# Patient Record
Sex: FEMALE | Race: WHITE | NOT HISPANIC OR LATINO | Employment: STUDENT | URBAN - METROPOLITAN AREA
[De-identification: names, ages, dates, MRNs, and addresses within clinical notes are randomized per-mention and may not be internally consistent; named-entity substitution may affect disease eponyms.]

---
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
## 2021-01-03 ENCOUNTER — HEALTH MAINTENANCE LETTER (OUTPATIENT)
Age: 21
End: 2021-01-03

## 2021-01-20 ENCOUNTER — OFFICE VISIT (OUTPATIENT)
Dept: ORTHOPEDICS | Facility: CLINIC | Age: 21
End: 2021-01-20
Payer: COMMERCIAL

## 2021-01-20 VITALS — WEIGHT: 140 LBS | BODY MASS INDEX: 24.8 KG/M2 | HEIGHT: 63 IN

## 2021-01-20 DIAGNOSIS — Z98.890 S/P SHOULDER SURGERY: Primary | ICD-10-CM

## 2021-01-20 ASSESSMENT — MIFFLIN-ST. JEOR: SCORE: 1379.17

## 2021-01-20 NOTE — LETTER
2021      RE: Leonela Morales  [address] [address]       Service Date: 2021      CHIEF COMPLAINT:  Postoperative visit, right shoulder.      DATE OF SURGERY:  2020.      HISTORY OF PRESENT ILLNESS:  Leonela Morales is a 20-year-old female who is now 4-1/2 months status post right shoulder arthroscopic posterior labral repair.  Doing quite well.  Ranks her shoulder as a 75/100.  She has no instability.  She has some minimal pain along her biceps.      PHYSICAL EXAMINATION:  Evaluation of her range of motion reveals 175 degrees of forward flexion, 60 degrees of external rotation, 95 degrees of external rotation in abduction, 20 degrees of internal rotation.  This compares to 180/60/95/40 on the contralateral left shoulder.  Her rotator cuff strength is 5/5.  She has no posterior apprehension.      IMPRESSION:  Four and half months status post right shoulder arthroscopic posterior labral repair.  Doing well.      PLAN:   1.  Leonela does need to work on her internal rotation.   2.  She should continue her strengthening.   3.  We can very gradually start to add some cheer activities.  We did discuss precautions.   4.  Follow up with me in 8 weeks for a routine recheck.         JEM ALEJANDRE MD             D: 2021   T: 2021   MT: ean      Name:     LEONELA MORALES   MRN:      [id number]        Account:      [id number]   :      2000           Service Date: 2021      Document: [id number]        Jem Alejandre MD

## 2021-01-20 NOTE — LETTER
Date:February 3, 2021      Patient was self referred, no letter generated. Do not send.        [facility] Health Information

## 2021-01-28 NOTE — PROGRESS NOTES
Service Date: 2021      CHIEF COMPLAINT:  Postoperative visit, right shoulder.      DATE OF SURGERY:  2020.      HISTORY OF PRESENT ILLNESS:  David Morales is a 20-year-old female who is now 4-1/2 months status post right shoulder arthroscopic posterior labral repair.  Doing quite well.  Ranks her shoulder as a 75/100.  She has no instability.  She has some minimal pain along her biceps.      PHYSICAL EXAMINATION:  Evaluation of her range of motion reveals 175 degrees of forward flexion, 60 degrees of external rotation, 95 degrees of external rotation in abduction, 20 degrees of internal rotation.  This compares to 180/60/95/40 on the contralateral left shoulder.  Her rotator cuff strength is 5/5.  She has no posterior apprehension.      IMPRESSION:  Four and half months status post right shoulder arthroscopic posterior labral repair.  Doing well.      PLAN:   1.  David does need to work on her internal rotation.   2.  She should continue her strengthening.   3.  We can very gradually start to add some cheer activities.  We did discuss precautions.   4.  Follow up with me in 8 weeks for a routine recheck.         JEM REYNOLDS MD             D: 2021   T: 2021   MT: ean      Name:     DAVID MORALES   MRN:      [id number]        Account:      [id number]   :      2000           Service Date: 2021      Document: [id number]

## 2021-02-02 NOTE — PROGRESS NOTES
[facility] ATHLETICS  Susu ATC follow-up note  Date of service performed: 2/1/2021    Concern/injury: R Shoulder    Assessment/plan: Athlete went home for [place] break but continued an at home rehab plan agreed upon by her and ATC. Athlete left with signs of impingement beginning on her R side but came pack with minimal to no pain. Athlete mentioned this to Dr. Alejandre and continuing to strengthen and proper scapular movement should help alleviate any pain. Athlete is compliant and energetic about rehabilitation. Continues to improve muscular endurance and strength. Athlete has slowly started a return to sport with some hand stands and light tumbling approved by Dr. Alejandre. Athlete would benefit from continued therapy to improve muscular endurance and return to sport.    [employer], ATC

## 2021-03-23 ENCOUNTER — OFFICE VISIT (OUTPATIENT)
Dept: ORTHOPEDICS | Facility: CLINIC | Age: 21
End: 2021-03-23
Payer: COMMERCIAL

## 2021-03-23 VITALS — HEIGHT: 63 IN | BODY MASS INDEX: 24.8 KG/M2 | WEIGHT: 140 LBS

## 2021-03-23 DIAGNOSIS — Z98.890 S/P SHOULDER SURGERY: Primary | ICD-10-CM

## 2021-03-23 ASSESSMENT — MIFFLIN-ST. JEOR: SCORE: 1379.17

## 2021-03-23 NOTE — LETTER
Date:April 1, 2021      Patient was self referred, no letter generated. Do not send.        [facility] Health Information

## 2021-03-23 NOTE — LETTER
3/23/2021      RE: David Morales  [address] [address] [address]       Service Date: 2021      CHIEF COMPLAINT:  Postoperative visit, right shoulder.      DATE OF SURGERY:  2020.      HISTORY OF PRESENT ILLNESS:  David Morales is a 21-year-old female who is now 6-1/2 months status post right shoulder posterior labral repair.  She is doing well.  Ranks her shoulder as an 85/100.  She has no instability.  She still has some soreness.  Overall, she is happy with her progress.      PHYSICAL EXAMINATION:  21-year-old female, alert and oriented, in no apparent distress.  Evaluation of her range of motion reveals 180 degrees of forward flexion, 60 degrees of external rotation, 95 degrees of external rotation in abduction and 35 degrees of internal rotation.  This compares to 180/60/95/40 on the contralateral left shoulder.  Rotator cuff strength is 5/5.  She has slight discomfort in apprehension.  She has slight discomfort on posterior apprehension.  This is much better than preop.  She has a negative anterior load shift and negative posterior load shift.      IMPRESSION:  6-1/2 months status post right shoulder arthroscopic posterior labral repair.  Ms. Morales is doing well.  We had a conversation about returning to athletics.  I think she can continue to advance her stunting.  I think we need to be careful in terms of fatigue factor as she really has not done much in the way of high-level athletic activity.  I think we need to be mindful of this as we start her summer training.      PLAN:   1.  Continue to work on a baseline cuff and periscapular strengthening program.   2.  Gradual advancement of her cheerleading participation.   3.  Follow up with me in September for a routine recheck.         JEM REYNOLDS MD             D: 2021   T: 2021   MT: ean      Name:     DAVID MORALES   MRN:      [id number]        Account:      [id number]   :      2000           Service  Date: 03/23/2021      Document: [id number]        Emiliano Alejandre MD

## 2021-03-28 NOTE — PROGRESS NOTES
Service Date: 2021      CHIEF COMPLAINT:  Postoperative visit, right shoulder.      DATE OF SURGERY:  2020.      HISTORY OF PRESENT ILLNESS:  David Morales is a 21-year-old female who is now 6-1/2 months status post right shoulder posterior labral repair.  She is doing well.  Ranks her shoulder as an 85/100.  She has no instability.  She still has some soreness.  Overall, she is happy with her progress.      PHYSICAL EXAMINATION:  21-year-old female, alert and oriented, in no apparent distress.  Evaluation of her range of motion reveals 180 degrees of forward flexion, 60 degrees of external rotation, 95 degrees of external rotation in abduction and 35 degrees of internal rotation.  This compares to 180/60/95/40 on the contralateral left shoulder.  Rotator cuff strength is 5/5.  She has slight discomfort in apprehension.  She has slight discomfort on posterior apprehension.  This is much better than preop.  She has a negative anterior load shift and negative posterior load shift.      IMPRESSION:  6-1/2 months status post right shoulder arthroscopic posterior labral repair.  Ms. Morales is doing well.  We had a conversation about returning to athletics.  I think she can continue to advance her stunting.  I think we need to be careful in terms of fatigue factor as she really has not done much in the way of high-level athletic activity.  I think we need to be mindful of this as we start her summer training.      PLAN:   1.  Continue to work on a baseline cuff and periscapular strengthening program.   2.  Gradual advancement of her cheerleading participation.   3.  Follow up with me in September for a routine recheck.         JME REYNOLDS MD             D: 2021   T: 2021   MT: ean      Name:     DAVID MORALES   MRN:      [id number]        Account:      [id number]   :      2000           Service Date: 2021      Document: [id number]

## 2021-05-15 ENCOUNTER — DOCUMENTATION ONLY (OUTPATIENT)
Dept: ORTHOPEDICS | Facility: CLINIC | Age: 21
End: 2021-05-15

## 2021-05-15 NOTE — PROGRESS NOTES
Completed Workability and faxed to [employer] at [phone]. Submitted original to Medical Records for scanning. Patient alerted via phone.

## 2021-05-19 DIAGNOSIS — U07.1 CLINICAL DIAGNOSIS OF COVID-19: ICD-10-CM

## 2021-05-20 ENCOUNTER — OFFICE VISIT (OUTPATIENT)
Dept: FAMILY MEDICINE | Facility: CLINIC | Age: 21
End: 2021-05-20
Payer: COMMERCIAL

## 2021-05-20 VITALS
DIASTOLIC BLOOD PRESSURE: 60 MMHG | HEIGHT: 63 IN | WEIGHT: 126.8 LBS | SYSTOLIC BLOOD PRESSURE: 114 MMHG | HEART RATE: 74 BPM | BODY MASS INDEX: 22.47 KG/M2

## 2021-05-20 DIAGNOSIS — U07.1 INFECTION DUE TO 2019 NOVEL CORONAVIRUS: Primary | ICD-10-CM

## 2021-05-20 LAB — INTERPRETATION ECG - MUSE: NORMAL

## 2021-05-20 ASSESSMENT — MIFFLIN-ST. JEOR: SCORE: 1314.29

## 2021-05-20 NOTE — PROGRESS NOTES
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 Tested positive in Oct. During illness patient experienced only minor symptoms. Denies having any symptoms of fever, night sweats, shortness of breath, wheezing, chest pain, cough, loss of taste, loss of smell, nausea, vomiting or diarrhea.Their course is resolved. Since quarantine they have tried full activity without any symptoms.    PMH, Medications and Allergies were reviewed and updated as needed.    ROS:  As noted above in HPI otherwise negative.    Patient Active Problem List   Diagnosis     Instability of shoulder joint, unspecified laterality   c    Current Outpatient Medications   Medication Sig Dispense Refill     acetaminophen (TYLENOL) 325 MG tablet Take 2 tablets (650 mg) by mouth every 4 hours as needed for mild pain (Patient not taking: Reported on 9/23/2020) 50 tablet 0     ibuprofen (ADVIL/MOTRIN) 600 MG tablet Take 1 tablet (600 mg) by mouth every 6 hours as needed for other (mild and/or inflammatory pain) (Patient not taking: Reported on 9/23/2020) 30 tablet 0     loratadine (CLARITIN) 10 MG tablet Take 10 mg by mouth daily       ondansetron (ZOFRAN) 8 MG tablet Take 1 tablet (8 mg) by mouth every 8 hours as needed for nausea (Patient not taking: Reported on 9/23/2020) 20 tablet 1     ondansetron (ZOFRAN-ODT) 4 MG ODT tab Take 1-2 tablets (4-8 mg) by mouth every 8 hours as needed for nausea (Patient not taking: Reported on 9/23/2020) 4 tablet 0     oxyCODONE (ROXICODONE) 5 MG tablet Take 1-2 tablets (5-10 mg) by mouth every 4 hours as needed for moderate to severe pain (Patient not taking: Reported on 9/23/2020) 30 tablet 0     senna-docusate (SENOKOT-S/PERICOLACE) 8.6-50 MG tablet Take 1-2 tablets by mouth 2 times daily (Patient not taking: Reported on 9/23/2020) 30  "tablet 0              OBJECTIVE:     /60   Pulse 74   Ht 1.6 m (5' 3\")   Wt 57.5 kg (126 lb 12.8 oz)   BMI 22.46 kg/m    Estimated body mass index is 22.46 kg/m  as calculated from the following:    Height as of this encounter: 1.6 m (5' 3\").    Weight as of this encounter: 57.5 kg (126 lb 12.8 oz).      GENERAL: Healthy, alert and no distress  EYES: Eyes grossly normal to inspection.  No discharge or erythema, or obvious scleral/conjunctival abnormalities.  Cardiac: Regular rate and rhythm. No murmer, rubs, or gallops.  RESP: No wheeze, rhonchi or rales. No cough, or visible cyanosis.  No visible retractions or increased work of breathing.    SKIN: Visible skin clear. No significant rash, abnormal pigmentation or lesions.  NEURO: Cranial nerves grossly intact.  Mentation and speech appropriate for age.  PSYCH: Mentation appears normal, affect normal/bright, judgement and insight intact, normal speech and appearance well-groomed.      EKG:  Sinus arrythmia and sinus stevan.  Unchanged from a previous ekg in 2020.             ASSESSMENT & PLAN:      There are no diagnoses linked to this encounter.    - Done with Isolation   - EKG done.  No change from previoius.  Will be overread by Cards.    Cleared for activity and cheer. Return sooner if develops new or worsening symptoms.    Options for treatment and/or follow-up care were reviewed with the patient and , . Patient was actively involved in the decision making process. Patient verbalized understanding and was in agreement with the plan.        Chucho Chahal MD  "

## 2021-05-20 NOTE — LETTER
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 Tested positive in Oct. During illness patient experienced only minor symptoms. Denies having any symptoms of fever, night sweats, shortness of breath, wheezing, chest pain, cough, loss of taste, loss of smell, nausea, vomiting or diarrhea.Their course is resolved. Since quarantine they have tried full activity without any symptoms.    PMH, Medications and Allergies were reviewed and updated as needed.    ROS:  As noted above in HPI otherwise negative.    Patient Active Problem List   Diagnosis     Instability of shoulder joint, unspecified laterality   c    Current Outpatient Medications   Medication Sig Dispense Refill     acetaminophen (TYLENOL) 325 MG tablet Take 2 tablets (650 mg) by mouth every 4 hours as needed for mild pain (Patient not taking: Reported on 9/23/2020) 50 tablet 0     ibuprofen (ADVIL/MOTRIN) 600 MG tablet Take 1 tablet (600 mg) by mouth every 6 hours as needed for other (mild and/or inflammatory pain) (Patient not taking: Reported on 9/23/2020) 30 tablet 0     loratadine (CLARITIN) 10 MG tablet Take 10 mg by mouth daily       ondansetron (ZOFRAN) 8 MG tablet Take 1 tablet (8 mg) by mouth every 8 hours as needed for nausea (Patient not taking: Reported on 9/23/2020) 20 tablet 1     ondansetron (ZOFRAN-ODT) 4 MG ODT tab Take 1-2 tablets (4-8 mg) by mouth every 8 hours as needed for nausea (Patient not taking: Reported on 9/23/2020) 4 tablet 0     oxyCODONE (ROXICODONE) 5 MG tablet Take 1-2 tablets (5-10 mg) by mouth every 4 hours as needed for moderate to severe pain (Patient not taking: Reported on 9/23/2020) 30 tablet 0     senna-docusate (SENOKOT-S/PERICOLACE) 8.6-50 MG tablet  "Take 1-2 tablets by mouth 2 times daily (Patient not taking: Reported on 9/23/2020) 30 tablet 0              OBJECTIVE:     /60   Pulse 74   Ht 1.6 m (5' 3\")   Wt 57.5 kg (126 lb 12.8 oz)   BMI 22.46 kg/m    Estimated body mass index is 22.46 kg/m  as calculated from the following:    Height as of this encounter: 1.6 m (5' 3\").    Weight as of this encounter: 57.5 kg (126 lb 12.8 oz).      GENERAL: Healthy, alert and no distress  EYES: Eyes grossly normal to inspection.  No discharge or erythema, or obvious scleral/conjunctival abnormalities.  Cardiac: Regular rate and rhythm. No murmer, rubs, or gallops.  RESP: No wheeze, rhonchi or rales. No cough, or visible cyanosis.  No visible retractions or increased work of breathing.    SKIN: Visible skin clear. No significant rash, abnormal pigmentation or lesions.  NEURO: Cranial nerves grossly intact.  Mentation and speech appropriate for age.  PSYCH: Mentation appears normal, affect normal/bright, judgement and insight intact, normal speech and appearance well-groomed.      EKG:  Sinus arrythmia and sinus stevan.  Unchanged from a previous ekg in 2020.             ASSESSMENT & PLAN:      There are no diagnoses linked to this encounter.    - Done with Isolation   - EKG done.  No change from previoius.  Will be overread by Cards.    Cleared for activity and cheer. Return sooner if develops new or worsening symptoms.    Options for treatment and/or follow-up care were reviewed with the patient and , . Patient was actively involved in the decision making process. Patient verbalized understanding and was in agreement with the plan.        MD Chucho Arshad MD    "

## 2021-05-26 ENCOUNTER — OFFICE VISIT (OUTPATIENT)
Dept: ORTHOPEDICS | Facility: CLINIC | Age: 21
End: 2021-05-26
Payer: COMMERCIAL

## 2021-05-26 DIAGNOSIS — Z98.890 S/P SHOULDER SURGERY: Primary | ICD-10-CM

## 2021-05-26 NOTE — LETTER
5/26/2021      RE: Leonela Mclean  [address] [facility] [address]       Service Date: 05/26/2021    CHIEF COMPLAINT:  Postoperative visit, right shoulder.    DATE OF SURGERY:  09/09/2020    HISTORY OF PRESENT ILLNESS:  Leonela Mclean is a 21-year-old female who is now 5-1/2 months status post right arthroscopic posterior labral repair.  Overall, Leonela is doing well.  She has had some increase in shoulder pain as her activity has increased.  The shoulder pain is in a different location.  Her pain is more anterior.  She points to her biceps groove.  She does not have any sensation of posterior instability.  This has resolved following her posterior labral repair.  She continues to do rehabilitation.    PHYSICAL EXAMINATION:  21-year-old female, alert, oriented, in no apparent distress.  Evaluation of her range of motion reveals symmetrical range of motion.  Rotator cuff strength is 5/5.  She has slight AC joint tenderness.  This is minimal.  She has more marked biceps groove tenderness.  She has a negative apprehension.  She has a negative posterior apprehension.  She has negative load and shift.  She has positive Chloe.  Positive uppercut test.    ASSESSMENT AND PLAN:  21-year-old female 6 months status post right posterior labral repair.  I do believe Leonela is having some biceps tendinitis.  We discussed this at length.  I explained that this is not uncommon given her activity level.  We discussed ways to manage this.  We discussed anti-inflammatory medication.  We discussed injections.  We discussed load management.  She does have some pretty significant training to do in July.    PLAN:    1.  She will continue rehabilitation program with her .  We will allow her to participate as tolerated.  2.  If her symptoms worsen, we will start her on an anti-inflammatory medication.  If we do not improve her symptoms, then we may consider an intra-articular corticosteroid injection.  3.  Follow  up with me in August for routine recheck or sooner as needed.    Emiliano Alejandre MD        D: 2021   T: 2021   MT: ean    Name:     DAVID MORALES  MRN:      [id number]        Account:      [id number]   :      2000           Service Date: 2021       Document: [id number]      Emiliano Alejandre MD

## 2021-05-26 NOTE — LETTER
Date:June 1, 2021      Patient was self referred, no letter generated. Do not send.        [facility] Health Information

## 2021-05-27 ASSESSMENT — PATIENT HEALTH QUESTIONNAIRE - PHQ9: SUM OF ALL RESPONSES TO PHQ QUESTIONS 1-9: 0

## 2021-05-27 NOTE — TELEPHONE ENCOUNTER
Question following Office Visit  When did you see your provider: Yesterday Dr. Matamoros  What is your question: Patient's mom Yamile is asking what was the reason school would ask for sickle cell screen. Relayed patient was tested yesterday and results were negative. Yamile is trying to determine what the ICD-10 code was for this test. It is not covered by her insurance and Yamile is trying to determine the cost. Please reach out to Yamile and advise.  Okay to leave a detailed message: Yes [phone]

## 2021-05-27 NOTE — TELEPHONE ENCOUNTER
Please let mother know that Dr. Matamoros is out of the clinic today and will be returning tomorrow. I will leave this for her tomorrow.

## 2021-05-27 NOTE — TELEPHONE ENCOUNTER
Called and discussed with mother.  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  She did have opportunity to waive it with sign waiver.  We discussed reasoning behind sickle cell screen.  Mom was satisfied with my answers.  She may call business office if not covered and high cost.  Mayela Matamoros MD 4/11/2019 10:09 AM

## 2021-05-27 NOTE — TELEPHONE ENCOUNTER
----- Message from Mayela Matamoros MD sent at 4/10/2019  2:48 PM CDT -----  Please call Leonela with a message of negative HIV, chlamydia test.  Sickle cell screen is negative (normal) as well.  Cholesterol levels are normal too.  Her number is typed in AVS.  She states OK to leave message. Mayela Matamoros MD 4/10/2019 2:48 PM

## 2021-05-27 NOTE — PROGRESS NOTES
[facility] Well Child Check    ASSESSMENT & PLAN  Leonela Mclean is a 19 y.o. who has normal growth and normal development.    Diagnoses and all orders for this visit:    Encounter for annual health examination  -     Meningococcal MCV4P  -     Lipid Cascade RANDOM  -     Hearing Screening  -     Vision Screening  -     PHQ9 Depression Screen  -     HIV Antigen/Antibody Screening Cascade  -     HM1(CBC and Differential)  -     Sickle Cell Screen  -     HM1 (CBC with Diff)  -     Chlamydia trachomatis & Neisseria gonorrhoeae, Amplified Detection    Other orders  -     Meningococcal B (PF)  -     Hepatitis A vaccine Ped/Adol 2 dose IM (18yr & under)      Completed sports physical required for athletics at [employer] for [facility] [employer], discussed sickle cell screening.  She has ability to waive sickle cell screening.  Discussed reasons to screen to identify people with sickle cell trait.  She chose to have sickle cell screening today    Also discussed recommendations for HIV and chlamydia screening as well.  She denies history of intercourse but agreed to screening.    Return to clinic in 1 year for a Well Child Check or sooner as needed    IMMUNIZATIONS/LABS  Immunizations were reviewed and orders were placed as appropriate.    REFERRALS  Dental:  The patient has already established care with a dentist.  Other:  No additional referrals were made at this time.    ANTICIPATORY GUIDANCE  I have reviewed age appropriate anticipatory guidance.    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  Plans on dorm living- hoping to live in [place].        No question data found.    Do you have any significant health concerns in your family history?: No  Family History   Problem Relation Age of Onset     Hyperlipidemia Mother      Cancer Paternal Grandmother      Cancer  Paternal Grandfather         Skin     Hypertension Other      Since your last visit, have there been any major changes in your family, such as a move, job change, separation, divorce, or death in the family?: No  Has a lack of transportation kept you from medical appointments?: No    Home  Who lives in your home?:  Mom,dad,brother  Social History     Social History Narrative    Lives with Mom, dad, and brother, Jacob (17)    Dad is a  and Mom is a     Mom and Dad are      Do you have any concerns about losing your housing?: No  Is your housing safe and comfortable?: Yes  Do you have any trouble with sleep?:  No    Education  What school do you child attend?:  [place]  What grade are you in?:  12th  How do you perform in school (grades, behavior, attention, homework?: Good     Eating  Do you eat regular meals including fruits and vegetables?:  yes  What are you drinking (cow's milk, water, soda, juice, sports drinks, energy drinks, etc)?: water and juice  Have you been worried that you don't have enough food?: No  Do you have concerns about your body or appearance?:  No    Activities  Do you have friends?:  yes  Do you get at least one hour of physical activity per day?:  yes  How many hours a day are you in front of a screen other than for schoolwork (computer, TV, phone)?:  1-5  What do you do for exercise?:  Gymnastic,workout  Do you have interest/participate in community activities/volunteers/school sports?:  yes    MENTAL HEALTH SCREENING  PHQ-2 Total Score: 0 (4/9/2019 11:21 AM)    PHQ-9 Total Score: 0 (4/9/2019 11:21 AM)      VISION/HEARING  Vision: aged out  Hearing:  aged out     Visual Acuity Screening    Right eye Left eye Both eyes   Without correction: 20/20 20/25 20/20   With correction:        Vision completed for sport physical screen.    TB Risk Assessment:  The patient and/or parent/guardian answer positive to:  patient and/or parent/guardian answer 'no' to all  "screening TB questions    Dyslipidemia Risk Screening  Have either of your parents or any of your grandparents had a stroke or heart attack before age 55?: No  Any parents with high cholesterol or currently taking medications to treat?: Yes: mom-, but not on medication     Dental  When was the last time you saw the dentist?: 3-6 months ago   Last fluoride varnish application was within the past 30 days. Fluoride not applied today.      Patient Active Problem List   Diagnosis     Limb Pain       Drugs  Does the patient use tobacco/alcohol/drugs?:   no    Safety  Does the patient have any safety concerns (peer or home)?:  no  Does the patient use safety belts, helmets and other safety equipment?:  yes    Sex  Have you ever had sex?:  No    MEASUREMENTS  Height:  5' 3\" (1.6 m)  Weight: 141 lb 14.4 oz (64.4 kg)  BMI: Body mass index is 25.14 kg/m .  Blood Pressure: 104/60  Blood pressure percentiles are not available for patients who are 18 years or older.    PHYSICAL EXAM  Constitutional: She appears well-developed and well-nourished. She is awake, alert, and cooperative.  HEENT: Head: Normocephalic. Atraumatic.   Right Ear: Normal, pearly tympanic membrane; external ear and canal normal.    Left Ear: Normal, pearly tympanic membrane; external ear and canal normal.    Nose: Nose normal.    Mouth/Throat: Mucous membranes are moist. Oropharynx is clear. Tonsils +1 bilaterally. Normal dentition.   Eyes: Conjunctivae and lids are normal. PERRL, EOMI.   Neck: Supple without lymphadenopathy or tenderness. No thyromegaly or nodules.  Cardiovascular: Normal rate and regular rhythm. No murmur heard. Femoral pulses 2+ bilaterally.  Pulmonary: Clear to auscultation bilaterally. Effort and breath sounds normal. There is normal air entry.   Chest: Normal chest wall. Breast development is normal. SMR 5.   Abdominal: Soft, nontender, nondistended. Bowel sounds are normal. No hepatosplenomegaly.  Musculoskeletal: Moving all " extremities with normal range of motion. Normal strength and tone. No abnormalities. No pain in the extremities.  Genitourinary: Normal external female genitalia. SMR 5.   Neurological: She is alert and oriented x3. She has normal reflexes. Normal tone and DTRs +2 bilaterally.  Psychiatric: She has a normal mood and affect. Her speech and behavior are normal.  Skin: Clear. No rashes or lesions noted.    Complete sports physical and questionnaire completed, no restrictions.

## 2021-05-27 NOTE — TELEPHONE ENCOUNTER
I let Leonela know her results we negative. She had no further questions at this time.   Vanessa Amanda,A

## 2021-05-28 ASSESSMENT — ANXIETY QUESTIONNAIRES: GAD7 TOTAL SCORE: 0

## 2021-05-28 NOTE — PROGRESS NOTES
Service Date: 05/26/2021    CHIEF COMPLAINT:  Postoperative visit, right shoulder.    DATE OF SURGERY:  09/09/2020    HISTORY OF PRESENT ILLNESS:  Leonela Mclean is a 21-year-old female who is now 5-1/2 months status post right arthroscopic posterior labral repair.  Overall, Leonela is doing well.  She has had some increase in shoulder pain as her activity has increased.  The shoulder pain is in a different location.  Her pain is more anterior.  She points to her biceps groove.  She does not have any sensation of posterior instability.  This has resolved following her posterior labral repair.  She continues to do rehabilitation.    PHYSICAL EXAMINATION:  21-year-old female, alert, oriented, in no apparent distress.  Evaluation of her range of motion reveals symmetrical range of motion.  Rotator cuff strength is 5/5.  She has slight AC joint tenderness.  This is minimal.  She has more marked biceps groove tenderness.  She has a negative apprehension.  She has a negative posterior apprehension.  She has negative load and shift.  She has positive [place].  Positive uppercut test.    ASSESSMENT AND PLAN:  21-year-old female 6 months status post right posterior labral repair.  I do believe Leonela is having some biceps tendinitis.  We discussed this at length.  I explained that this is not uncommon given her activity level.  We discussed ways to manage this.  We discussed anti-inflammatory medication.  We discussed injections.  We discussed load management.  She does have some pretty significant training to do in July.    PLAN:    1.  She will continue rehabilitation program with her .  We will allow her to participate as tolerated.  2.  If her symptoms worsen, we will start her on an anti-inflammatory medication.  If we do not improve her symptoms, then we may consider an intra-articular corticosteroid injection.  3.  Follow up with me in August for routine recheck or sooner as needed.    Emiliano Alejandre,  MD        D: 2021   T: 2021   MT: ean    Name:     DAVID MORALES  MRN:      [id number]        Account:      [id number]   :      2000           Service Date: 2021       Document: [id number]

## 2021-06-02 VITALS — WEIGHT: 141.9 LBS | HEIGHT: 63 IN | BODY MASS INDEX: 25.14 KG/M2

## 2021-06-04 VITALS
HEIGHT: 63 IN | SYSTOLIC BLOOD PRESSURE: 100 MMHG | BODY MASS INDEX: 25.52 KG/M2 | DIASTOLIC BLOOD PRESSURE: 70 MMHG | WEIGHT: 144 LBS | HEART RATE: 60 BPM

## 2021-06-17 NOTE — PATIENT INSTRUCTIONS - HE
"Patient Instructions by Mayela Matamoros MD at 4/9/2019 11:00 AM     Author: Mayela Matamoros MD Service: -- Author Type: Physician    Filed: 4/9/2019 12:04 PM Encounter Date: 4/9/2019 Status: Addendum    : Mayela Matamoros MD (Physician)    Related Notes: Original Note by Mayela Matamoros MD (Physician) filed at 4/9/2019 12:04 PM       I will put in orders for future immunizations that can be completed at any time at a shot only appointment  1) Hepatitis A  2) Meningococcal vaccine (2 of 2 series) and 3) Meningococcal B vaccine- it is a vaccine that protects against a specific strain of bacteria that the previous Meningococcal vaccine does not cover.  Recommended for patients entering college, but not typically required.  I do recommend it.    We will obtain cholesterol, hemoglobin and sickle cell trait screening today    Sharon cell is : [phone]  I will call with results (or my staff will) and also send a letter home.    \"As part of annual well teen checks, the [place] Department of Health recommends that females age 16 and older be screened yearly for chlamydia, whether or not they have told their provider they have a history of sexual activity.  Chlamydia is a sexually transmitted infection that does not always have symptoms.  Untreated chlamydia infections can lead to infertility, chronic pelvic pain, or ectopic pregnancy.  Your child may have had chlamydia screening as part of their well teen check today.  However, in accordance with state law (statute 144.431-347), your teen's results of chlamydia testing are confidential to your teen and medical provider.  This statue guarantees confidentiality for teens and their health care providers when discussing sexually transmitted infections.  If your child was tested, I will notify your child of the result directly.\"        Patient Education             [facility] Patient Handout   18 to 21 Year Visits     Your Daily " Faxed again to [phone]   Life    Visit the dentist at least twice a year.    Protect your hearing at work, home, and concerts.    Eat a variety of healthy foods.    Eat breakfast every morning.    Drink plenty of water.    Make sure to get enough calcium.    Have 3 or more servings of low-fat (1%) or fat-free milk and other low-fat dairy products each day.    Aim for 1 hour of vigorous physical activity.    Be proud of yourself when you do something well.  Healthy Behavior Choices    Support friends who choose not to use drugs, alcohol, tobacco, steroids, or diet pills.    If you use drugs or alcohol, you can talk to us about it. We can help you with quitting or cutting down on your use.    Make healthy decisions about your sexual behavior.    If you are sexually active, always practice safe sex. Always use a condom to prevent STIs.    All sexual activity should be something you want. No one should ever force or try to convince you.    Find safe activities at school and in the community. Violence and Injuries    Do not drink and drive or ride in a vehicle with someone who has been using drugs or alcohol.    If you feel unsafe driving or riding with someone, call someone you trust to drive you.    Always wear a seat belt in the car.    Know the rules for safe driving.    Never allow physical harm of yourself or others at home or school.    Always deal with conflict using nonviolence.    Remember that healthy dating relationships are built on respect and that saying no is OK.    Fighting and carrying weapons can be dangerous.  Your Feelings    Figure out healthy ways to deal with stress.    Try your best to solve problems and make decisions on your own.    Most people have daily ups and downs. But if you are feeling sad, depressed, nervous, irritable, hopeless, or angry, talk with me or another health professional.    We understand sexuality is an important part of your development. If you have any questions or concerns, we are here for you.  School and Friends    Take responsibility for being organized enough to succeed in work or school.    Find new activities you enjoy.    Consider volunteering and helping others in the community on an issue that interests or concerns you.    Form healthy friendships and find fun, safe things to do with friends.    As you get older, making and keeping friends is important. You may find that you drift away from some of your old friends--thats normal.    Evaluate your friendships and keep those that are healthy.    It is still important to stay connected with your family.

## 2021-06-19 NOTE — LETTER
Letter by Mayela Matamoros MD at      Author: Mayela Matamoros MD Service: -- Author Type: --    Filed:  Encounter Date: 4/9/2019 Status: (Other)         April 9, 2019     Patient: Leonela Mclean   YOB: 2000   Date of Visit: 4/9/2019       To Whom it May Concern:    Leonela Mclean was seen in my clinic on 4/9/2019.    If you have any questions or concerns, please don't hesitate to call.    Sincerely,         Electronically signed by Mayela Matamoros MD

## 2021-06-19 NOTE — LETTER
Letter by Mayela Matamoros MD at      Author: Mayela Matamoros MD Service: -- Author Type: --    Filed:  Encounter Date: 4/10/2019 Status: (Other)       Parent/guardian of Leonela Mclean  [address]  [place] [facility] [address]             April 10, 2019         Dear Leonela Mclean,    Below are the results from Leonela's recent visit:    Resulted Orders   Lipid Cascade RANDOM   Result Value Ref Range    Cholesterol 187 <=199 mg/dL    Triglycerides 62 <=149 mg/dL    HDL Cholesterol 71 >=50 mg/dL    LDL Calculated 104 <=129 mg/dL    Patient Fasting > 8hrs? Yes    HIV Antigen/Antibody Screening Cascade   Result Value Ref Range    HIV Antigen / Antibody Negative Negative    Narrative    Method is Abbott HIV Ag/Ab for the detection of HIV p24 antigen, HIV-1 antibodies and HIV-2 antibodies.   Sickle Cell Screen   Result Value Ref Range    Sickle Cell Screen Negative Negative   HM1 (CBC with Diff)   Result Value Ref Range    WBC 5.2 4.0 - 11.0 thou/uL    RBC 4.91 3.80 - 5.40 mill/uL    Hemoglobin 14.4 12.0 - 16.0 g/dL    Hematocrit 42.5 35.0 - 47.0 %    MCV 87 80 - 100 fL    MCH 29.3 27.0 - 34.0 pg    MCHC 33.9 32.0 - 36.0 g/dL    RDW 12.0 11.0 - 14.5 %    Platelets 262 140 - 440 thou/uL    MPV 7.2 7.0 - 10.0 fL    Neutrophils % 62 50 - 70 %    Lymphocytes % 30 20 - 40 %    Monocytes % 6 2 - 10 %    Eosinophils % 2 0 - 6 %    Basophils % 0 0 - 2 %    Neutrophils Absolute 3.2 2.0 - 7.7 thou/uL    Lymphocytes Absolute 1.6 0.8 - 4.4 thou/uL    Monocytes Absolute 0.3 0.0 - 0.9 thou/uL    Eosinophils Absolute 0.1 0.0 - 0.4 thou/uL    Basophils Absolute 0.0 0.0 - 0.2 thou/uL   Chlamydia trachomatis & Neisseria gonorrhoeae, Amplified Detection   Result Value Ref Range    Chlamydia trachomatis, Amplified Detection Negative Negative    Neisseria gonorrhoeae, Amplified Detection Negative Negative           Please call with questions or contact us using [employer].    Sincerely,        Electronically signed by Mayela  Blessing Matamoros MD

## 2021-06-29 NOTE — PROGRESS NOTES
"Progress Notes by Franny Porter MD at 8/3/2020 10:40 AM     Author: Franny Porter MD Service: -- Author Type: Physician    Filed: 8/3/2020 12:35 PM Encounter Date: 8/3/2020 Status: Signed    : Franny Porter MD (Physician)       FEMALE PREVENTATIVE EXAM    Assessment and Plan:     Leonela was seen today for annual exam.    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  She had cholesterol levels, hemoglobin, HIV and GC testing done last year 4/2019.  She has never been sexually active and defers additional testing today.  She will be due for a Pap at the age of 22.  I did offer her a pelvic exam given her concern for not being able to insert a tampon but she suspects it is likely due to \"tensing up \"and she is going to continue topractice with this further.  She declined  exam today.             Encounter for counseling regarding contraception  Reviewed range of contraceptive options, from OCPs, the patch, Depo-provera, to hormonal vs nonhormonal IUD and nexplanon, as well as various options for natural family planning or surgical. Reviewed typical effectiveness of each as well as side effect profiles of these options, including effects on spotting, nausea/ headaches, and emotional lability as well as risk for DVT/PE. Pt elects for most likely going with Depo-Provera. She will contact me if she confirms she wants to start this. Good candidate for this, with no h/o migraine w/ aura, liver or clotting or kidney issues. Nonsmoker.       Next follow up:  Return in about 1 year (around 8/3/2021) for Annual physical.    Immunization Review  Adult Imm Review: No immunizations due today    I discussed the following with the patient:   Adult Healthy Living: Importance of regular exercise  Healthy nutrition  Weight loss referral options  Getting adequate sleep  Stress " management  Use of seat belts  Distracted driving  Helmets  Sporting equipment safety  Firearm safety  STI prevention  Contraception options  Supplement use  Herbal medications/alternative medical therapies    I have had an Advance Directives discussion with the patient.    Subjective:   Chief Complaint: Leonela Mclean is an 20 y.o. female here for a preventative health visit.     HPI:    Chief Complaint   Patient presents with   ? Annual Exam       She has a left labral shoulder tear- elective surgery in April was canceled.  She is a cheerleader at [employer]; now putting off the surgery until Feb 2021.  Reports some instability and gives out sometimes. Working with her ; doing PT as well.    She will be a sophomore in college- studying kinesiology. Thinking sports med field for a career. Did well in school. 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 Exercises daily at the gym daily, and cheer team. No smoking or drug use.     Wondering about birth control options.  She is never been sexually active yet but would like to have something available if or when she decides to be.    She reports she struggles to place a tampon so for this reason is not interested in the IUD or vaginal insert  Thinking about Depo injection.   No fam hx of clotting disorder or liver issues.  No personal hx of migraines.     Social History     Social History Narrative    Lives with Mom, dad, and brother, Jacob     Dad is a  and Mom is a     Mom and Dad are         She will be a sophomore in college- studying kinesiology. Thinking sports med field for a career. 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 No smoking or drug use. Denies alcohol concerns.       Healthy Habits  Are you taking a daily aspirin? No  Do you typically exercising at least 40 min, 3-4 times per week?  Yes  Do you usually eat at least  "4 servings of fruit and vegetables a day, include whole grains and fiber and avoid regularly eating high fat foods? Yes  Have you had an eye exam in the past two years? Yes  Do you see a dentist twice per year? Yes  Do you have any concerns regarding sleep? No    Safety Screen  If you own firearms, are they secured in a locked gun cabinet or with trigger locks? NO  Do you feel you are safe where you are living?: Yes (8/3/2020 10:41 AM)  Do you feel you are safe in your relationship(s)?: Yes (8/3/2020 10:41 AM)      Review of Systems:  Please see above.  The rest of the review of systems are negative for all systems.       Cancer Screening     Patient has no health maintenance due at this time          Patient Care Team:  Mayela Matamoros MD as PCP - General (Pediatrics)  Mayela Matamoros MD as Assigned PCP        History     Reviewed By Date/Time Sections Reviewed    Franny Porter MD 8/3/2020 12:35 PM Social Documentation    Franny Porter MD 8/3/2020 12:25 PM Medical, Surgical, Tobacco, Alcohol, Drug Use, Sexual Activity, Family    HesumitchChelsi CMA 8/3/2020 10:42 AM Tobacco            Objective:   Vital Signs:   Visit Vitals  /70 (Patient Site: Left Arm, Patient Position: Sitting, Cuff Size: Adult Regular)   Pulse 60   Ht 5' 2.5\" (1.588 m)   Wt 144 lb (65.3 kg)   LMP 07/27/2020 (Approximate)   Breastfeeding No   BMI 25.92 kg/m           PHYSICAL EXAM  Constitutional: Patient is oriented to person, place, and time. Patient appears well-developed and well-nourished. No distress.   Head: Normocephalic and atraumatic.   Ears: External ear and TMs normal bilaterally.  Nose: Nose normal.   Mouth/Throat: Oropharynx is clear and moist. No oropharyngeal exudate.   Eyes: Conjunctivae and EOM are normal. Pupils are equal, round, and reactive to light. No discharge. No scleral icterus.   Neck: Neck supple. No JVD present. No tracheal deviation present. No thyromegaly " present.  Breasts: not indicated based on USPSTF recommendations for asymptomatic women  Cardiovascular: Normal rate, regular rhythm, normal heart sounds and intact distal pulses. No murmur heard.   Pulmonary/Chest: Effort normal and breath sounds normal. Patient has no wheezes, no rales, exhibits no tenderness.   Abdominal: Soft. Bowel sounds are normal. No masses. There is no tenderness.   Lymphadenopathy:  Patient has no cervical adenopathy.   Neurological: Patient is alert and oriented to person, place, and time. Patient has normal reflexes. No cranial nerve deficit. Coordination normal.   Skin: Skin is warm and dry. No rash noted. No pallor.   Pelvic: pt declined exam  Psychiatric: Patient has good eye contact without any psychomotor retardation or stereotypic behaviors.  normal mood and affect. Judgment and thought content normal.   Speech is regular rate and rhythm.          Medication List          Accurate as of August 3, 2020 12:35 PM. If you have any questions, ask your nurse or doctor.            CONTINUE taking these medications    loratadine 10 mg tablet  Also known as:  CLARITIN  INSTRUCTIONS:  Take 10 mg by mouth daily.               Additional Screenings Completed Today:   PHQ9 score PHQ-9 Total Score: 0 (8/3/2020 11:00 AM)    Little interest or pleasure in doing things: Not at all  Feeling down, depressed, or hopeless: Not at all  Trouble falling or staying asleep, or sleeping too much: Not at all  Feeling tired or having little energy: Not at all  Poor appetite or overeating: Not at all  Feeling bad about yourself - or that you are a failure or have let yourself or your family down: Not at all  Trouble concentrating on things, such as reading the newspaper or watching television: Not at all  Moving or speaking so slowly that other people could have noticed. Or the opposite - being so fidgety or restless that you have been moving around a lot more than usual: Not at all  Thoughts that you would be  better off dead, or of hurting yourself in some way: Not at all  PHQ-9 Total Score: 0    GAD7 score TOM-7 Total: 0 (8/3/2020 11:00 AM)    Feeling nervous, anxious or on edge: 0 (8/3/2020 11:00 AM)  Not being able to stop or control worry: 0 (8/3/2020 11:00 AM)  Worrying too much about different things: 0 (8/3/2020 11:00 AM)  Trouble relaxin (8/3/2020 11:00 AM)  Being so restless that is is hard to sit still: 0 (8/3/2020 11:00 AM)  Becoming easily annnoyed or irritable: 0 (8/3/2020 11:00 AM)  Feeling afraid as if something awful might happen: 0 (8/3/2020 11:00 AM)  TOM-7 Total: 0 (8/3/2020 11:00 AM)

## 2021-07-19 DIAGNOSIS — G89.29 CHRONIC SHOULDER PAIN, UNSPECIFIED LATERALITY: Primary | ICD-10-CM

## 2021-07-19 DIAGNOSIS — M25.519 CHRONIC SHOULDER PAIN, UNSPECIFIED LATERALITY: Primary | ICD-10-CM

## 2021-07-22 ENCOUNTER — OFFICE VISIT (OUTPATIENT)
Dept: FAMILY MEDICINE | Facility: CLINIC | Age: 21
End: 2021-07-22
Payer: COMMERCIAL

## 2021-07-22 VITALS
DIASTOLIC BLOOD PRESSURE: 62 MMHG | BODY MASS INDEX: 22.11 KG/M2 | HEART RATE: 55 BPM | HEIGHT: 63 IN | SYSTOLIC BLOOD PRESSURE: 114 MMHG | WEIGHT: 124.8 LBS

## 2021-07-22 DIAGNOSIS — R53.83 FATIGUE, UNSPECIFIED TYPE: Primary | ICD-10-CM

## 2021-07-22 ASSESSMENT — ENCOUNTER SYMPTOMS
PSYCHIATRIC NEGATIVE: 1
CARDIOVASCULAR NEGATIVE: 1
RESPIRATORY NEGATIVE: 1
GASTROINTESTINAL NEGATIVE: 1
NEUROLOGICAL NEGATIVE: 1

## 2021-07-22 ASSESSMENT — MIFFLIN-ST. JEOR: SCORE: 1305.22

## 2021-07-22 NOTE — PROGRESS NOTES
HPI  In for fatigue and 2 months without having her period.  She knows it is likely because she started running a lot in training for a half marathon.  It was a lot more running than what she usually does.  She didn't change her dietary intake and noticed that she was getting more tired, losing some weight and not having her period.  She hadn't had this happen before.  Low TRIAD risk on intake exam.  No stress injuries.     Review of Systems   Constitutional: Positive for malaise/fatigue.   HENT: Negative.    Respiratory: Negative.    Cardiovascular: Negative.    Gastrointestinal: Negative.    Genitourinary: Negative.    Neurological: Negative.    Psychiatric/Behavioral: Negative.          Physical Exam  Constitutional:       Appearance: Normal appearance.   HENT:      Head: Normocephalic and atraumatic.   Cardiovascular:      Rate and Rhythm: Normal rate.      Pulses: Normal pulses.      Heart sounds: Normal heart sounds.   Pulmonary:      Effort: Pulmonary effort is normal. No respiratory distress.      Breath sounds: Normal breath sounds. No wheezing.   Abdominal:      General: Abdomen is flat. There is no distension.      Palpations: There is no mass.      Tenderness: There is no abdominal tenderness.   Neurological:      Mental Status: She is alert and oriented to person, place, and time.   Psychiatric:         Mood and Affect: Mood normal.         Behavior: Behavior normal.         Thought Content: Thought content normal.           Fatigue  -this is all likely explained by calorie deficit with increased running activity and not increased intake.  Discussed this at length.  She feels she can increase dietary intake and has already decreased running.   Offered nutrition ref if she desires.  If not feeling better or having return of her period we will start a full medical work up.  ATC to check on pregnancy possibility as well.

## 2021-07-22 NOTE — LETTER
7/22/2021      RE: Leonela Mclean  [address] [facility] [address]       HPI  In for fatigue and 2 months without having her period.  She knows it is likely because she started running a lot in training for a half marathon.  It was a lot more running than what she usually does.  She didn't change her dietary intake and noticed that she was getting more tired, losing some weight and not having her period.  She hadn't had this happen before.  Low TRIAD risk on intake exam.  No stress injuries.     Review of Systems   Constitutional: Positive for malaise/fatigue.   HENT: Negative.    Respiratory: Negative.    Cardiovascular: Negative.    Gastrointestinal: Negative.    Genitourinary: Negative.    Neurological: Negative.    Psychiatric/Behavioral: Negative.          Physical Exam  Constitutional:       Appearance: Normal appearance.   HENT:      Head: Normocephalic and atraumatic.   Cardiovascular:      Rate and Rhythm: Normal rate.      Pulses: Normal pulses.      Heart sounds: Normal heart sounds.   Pulmonary:      Effort: Pulmonary effort is normal. No respiratory distress.      Breath sounds: Normal breath sounds. No wheezing.   Abdominal:      General: Abdomen is flat. There is no distension.      Palpations: There is no mass.      Tenderness: There is no abdominal tenderness.   Neurological:      Mental Status: She is alert and oriented to person, place, and time.   Psychiatric:         Mood and Affect: Mood normal.         Behavior: Behavior normal.         Thought Content: Thought content normal.       Fatigue  -this is all likely explained by calorie deficit with increased running activity and not increased intake.  Discussed this at length.  She feels she can increase dietary intake and has already decreased running.   Offered nutrition ref if she desires.  If not feeling better or having return of her period we will start a full medical work up.  ATC to check on pregnancy possibility as  demetri.      Chucho Chahal MD

## 2021-07-31 DIAGNOSIS — M25.519 CHRONIC SHOULDER PAIN, UNSPECIFIED LATERALITY: ICD-10-CM

## 2021-07-31 DIAGNOSIS — G89.29 CHRONIC SHOULDER PAIN, UNSPECIFIED LATERALITY: ICD-10-CM

## 2021-08-05 ENCOUNTER — OFFICE VISIT (OUTPATIENT)
Dept: FAMILY MEDICINE | Facility: CLINIC | Age: 21
End: 2021-08-05
Payer: COMMERCIAL

## 2021-08-05 VITALS
HEART RATE: 60 BPM | BODY MASS INDEX: 19.94 KG/M2 | WEIGHT: 116.8 LBS | SYSTOLIC BLOOD PRESSURE: 112 MMHG | DIASTOLIC BLOOD PRESSURE: 62 MMHG | HEIGHT: 64 IN

## 2021-08-05 DIAGNOSIS — N91.1 SECONDARY AMENORRHEA: ICD-10-CM

## 2021-08-05 DIAGNOSIS — N94.2 VAGINISMUS: ICD-10-CM

## 2021-08-05 DIAGNOSIS — R53.83 FATIGUE, UNSPECIFIED TYPE: ICD-10-CM

## 2021-08-05 DIAGNOSIS — Z00.00 ROUTINE GENERAL MEDICAL EXAMINATION AT A HEALTH CARE FACILITY: Primary | ICD-10-CM

## 2021-08-05 LAB
CHOLEST SERPL-MCNC: 194 MG/DL
ERYTHROCYTE [DISTWIDTH] IN BLOOD BY AUTOMATED COUNT: 13.8 % (ref 10–15)
FASTING STATUS PATIENT QL REPORTED: NORMAL
FASTING STATUS PATIENT QL REPORTED: NORMAL
FERRITIN SERPL-MCNC: 110 NG/ML (ref 10–130)
GLUCOSE BLD-MCNC: 73 MG/DL (ref 70–125)
HCT VFR BLD AUTO: 41.7 % (ref 35–47)
HDLC SERPL-MCNC: 76 MG/DL
HGB BLD-MCNC: 13.9 G/DL (ref 11.7–15.7)
LDLC SERPL CALC-MCNC: 103 MG/DL
MCH RBC QN AUTO: 31.4 PG (ref 26.5–33)
MCHC RBC AUTO-ENTMCNC: 33.3 G/DL (ref 31.5–36.5)
MCV RBC AUTO: 94 FL (ref 78–100)
PLATELET # BLD AUTO: 214 10E3/UL (ref 150–450)
RBC # BLD AUTO: 4.42 10E6/UL (ref 3.8–5.2)
TRIGL SERPL-MCNC: 74 MG/DL
TSH SERPL DL<=0.005 MIU/L-ACNC: 1.14 UIU/ML (ref 0.3–5)
WBC # BLD AUTO: 3.8 10E3/UL (ref 4–11)

## 2021-08-05 PROCEDURE — 82306 VITAMIN D 25 HYDROXY: CPT | Performed by: FAMILY MEDICINE

## 2021-08-05 PROCEDURE — 99395 PREV VISIT EST AGE 18-39: CPT | Performed by: FAMILY MEDICINE

## 2021-08-05 PROCEDURE — 82947 ASSAY GLUCOSE BLOOD QUANT: CPT | Performed by: FAMILY MEDICINE

## 2021-08-05 PROCEDURE — 85027 COMPLETE CBC AUTOMATED: CPT | Performed by: FAMILY MEDICINE

## 2021-08-05 PROCEDURE — 99213 OFFICE O/P EST LOW 20 MIN: CPT | Mod: 25 | Performed by: FAMILY MEDICINE

## 2021-08-05 PROCEDURE — 96127 BRIEF EMOTIONAL/BEHAV ASSMT: CPT | Performed by: FAMILY MEDICINE

## 2021-08-05 PROCEDURE — 84443 ASSAY THYROID STIM HORMONE: CPT | Performed by: FAMILY MEDICINE

## 2021-08-05 PROCEDURE — 36415 COLL VENOUS BLD VENIPUNCTURE: CPT | Performed by: FAMILY MEDICINE

## 2021-08-05 PROCEDURE — 82728 ASSAY OF FERRITIN: CPT | Performed by: FAMILY MEDICINE

## 2021-08-05 PROCEDURE — 80061 LIPID PANEL: CPT | Performed by: FAMILY MEDICINE

## 2021-08-05 ASSESSMENT — MIFFLIN-ST. JEOR: SCORE: 1276.86

## 2021-08-05 NOTE — PROGRESS NOTES
SUBJECTIVE:   CC: Leonela Mclean is an 21 year old woman who presents for preventive health visit.     Patient has been advised of split billing requirements and indicates understanding: Yes  Healthy Habits:     Getting at least 3 servings of Calcium per day:  Yes    Bi-annual eye exam:  Yes    Dental care twice a year:  Yes    Sleep apnea or symptoms of sleep apnea:  None    Diet:  Other    Frequency of exercise:  6-7 days/week    Duration of exercise:  Greater than 60 minutes    Taking medications regularly:  Yes    Medication side effects:  None    PHQ-2 Total Score: 0    Additional concerns today:  No  Abnormal Bleeding Problem      She had COVID 19 illness for 10 days in November 2020. She did go through cardiac clearance protocols per her [employer] sports team.    Right shoulder arthroscopy Sept 2020 and recovered from this. She is on diclofenac during her [employer] practices (twice a day). This med was started just a few weeks ago.    No menstural cycle since beginning of May. Normally she is very regular. Was training for a half marathon (which she completed 2 weeks ago now). t was a lot more running than what she usually does.  She didn't change her dietary intake and noticed that she was getting more tired, losing some weight and not having her period.  She hadn't had this happen before.  No stress injuries.       Trying to increase her caloric intake; avoiding more aggressive workouts. Not running as much in the past 2 weeks now in effort to return her menstrual cycle.     She is down about 10lbs in the past 2 months even due to her marathon training.    Highest weight was 145lbs as freshman but last year mostly at 130lbs. Currently at about 116lbs.    She is taking Vitamin D supplement and loves her veggies/spinach!      Wt Readings from Last 3 Encounters:   08/05/21 53 kg (116 lb 12.8 oz)   07/22/21 56.6 kg (124 lb 12.8 oz)   05/20/21 57.5 kg (126 lb 12.8 oz)             Today's PHQ-2 Score:   PHQ-2  "( 1999 Pfizer) 8/5/2021   Q1: Little interest or pleasure in doing things 0   Q2: Feeling down, depressed or hopeless 0   PHQ-2 Score 0   Q1: Little interest or pleasure in doing things Not at all   Q2: Feeling down, depressed or hopeless Not at all   PHQ-2 Score 0       Abuse: Current or Past (Physical, Sexual or Emotional) - No  Do you feel safe in your environment? Yes        Social History     Tobacco Use     Smoking status: Never Smoker     Smokeless tobacco: Never Used   Substance Use Topics     Alcohol use: Not on file         Alcohol Use 8/5/2021   Prescreen: >3 drinks/day or >7 drinks/week? Not Applicable       Reviewed orders with patient.  Reviewed health maintenance and updated orders accordingly - Yes    History of abnormal Pap smear: NO - under age 21, PAP not appropriate for age     Reviewed and updated as needed this visit by clinical staff  Tobacco  Allergies  Meds  Problems  Med Hx  Surg Hx  Fam Hx          Reviewed and updated as needed this visit by Provider  Tobacco  Allergies  Meds  Problems  Med Hx  Surg Hx  Fam Hx             Review of Systems   Genitourinary: Positive for menstrual problem.        OBJECTIVE:   /62 (BP Location: Left arm, Patient Position: Sitting, Cuff Size: Adult Regular)   Pulse 60   Ht 1.613 m (5' 3.5\")   Wt 53 kg (116 lb 12.8 oz)   LMP 05/05/2021 (Approximate)   BMI 20.37 kg/m    Physical Exam  Constitutional: Patient is oriented to person, place, and time. Patient appears well-developed and well-nourished. No distress.   Head: Normocephalic and atraumatic.   Ears: External ear and TMs normal bilaterally.  Nose: Nose normal.   Mouth/Throat: Oropharynx is clear and moist. No oropharyngeal exudate.   Eyes: Conjunctivae and EOM are normal. Pupils are equal, round, and reactive to light. No discharge. No scleral icterus.   Neck: Neck supple. No JVD present. No tracheal deviation present. No thyromegaly present.  Breasts: not indicated based on USPSTF " recommendations for asymptomatic women  Cardiovascular: Normal rate, regular rhythm, normal heart sounds and intact distal pulses. No murmur heard.   Pulmonary/Chest: Effort normal and breath sounds normal. Patient has no wheezes, no rales, exhibits no tenderness.   Abdominal: Soft. Bowel sounds are normal. No masses. There is no tenderness.   Lymphadenopathy:  Patient has no cervical adenopathy.   Neurological: Patient is alert and oriented to person, place, and time. Patient has normal reflexes. No cranial nerve deficit. Coordination normal.   Skin: Skin is warm and dry. No rash noted. No pallor.   Pelvic: pt declined exam  Psychiatric: Patient has good eye contact without any psychomotor retardation or stereotypic behaviors.  normal mood and affect. Judgment and thought content normal.   Speech is regular rate and rhythm.       Diagnostic Test Results:  Labs reviewed in Epic    ASSESSMENT/PLAN:   Leonela was seen today for physical and abnormal bleeding problem.    Diagnoses and all orders for this visit:    Routine general medical examination at a health care facility  -     Lipid Profile  -     Glucose    Fatigue, unspecified type  We will check a few labs today to evaluate the fatigue further though for now can hold off on the full metabolic work-up for the secondary amenorrhea (see next).  She has never been sexually active, defers UPT/chlamydia testing today.  -     TSH with free T4 reflex  -     CBC with platelets  -     Ferritin  -     Vitamin D Deficiency    Secondary amenorrhea  Agree with her recent visit to Dr. Chahal in that her calorie deficit and excessive exercise while training for her half marathon has likely contributed to her 2 months of amenorrhea (with 10-15lb weight loss).  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  I have encouraged her to work with my fitness pal to track calories a few  "days per week to get a sense for if she is meeting these goals.  We can certainly help place referral to the nutritionist if she desires this down the road but she feels this information today was helpful as a guideline.  We also reviewed calorically dense healthy nutritious foods such as avocado, coconut milk, avocado oil/oil olive oil for healthy fats as well as increasing protein.     --> Advised to follow up after 3 months of adequate caloric intake if still not having menstrual cycles with return of her normal body weight. Pt agreeable with plan.        Vaginismus  She continues to struggle with tampon insertion but hasn't attempted much lately. Declines exam today. Suspect vaginismus.  Offered pelvic floor therapy if desires down the road. Never sexually active yet.         Patient has been advised of split billing requirements and indicates understanding: Yes           COUNSELING:  Reviewed preventive health counseling, as reflected in patient instructions    Estimated body mass index is 20.37 kg/m  as calculated from the following:    Height as of this encounter: 1.613 m (5' 3.5\").    Weight as of this encounter: 53 kg (116 lb 12.8 oz).    Weight management plan noted, stable and monitoring    She reports that she has never smoked. She has never used smokeless tobacco.      Counseling Resources:  ATP IV Guidelines  Pooled Cohorts Equation Calculator  Breast Cancer Risk Calculator  BRCA-Related Cancer Risk Assessment: FHS-7 Tool  FRAX Risk Assessment  ICSI Preventive Guidelines  Dietary Guidelines for Americans, 2010  USDA's MyPlate  ASA Prophylaxis  Lung CA Screening    Franny Porter MD  [facility]"

## 2021-08-05 NOTE — PATIENT INSTRUCTIONS
Your BMR is 1300 to JUST REST all day. Likely needing closer to the 2500 calories daily to sustain your workout intensity.  [employer] Pal to track calories.

## 2021-08-06 LAB — DEPRECATED CALCIDIOL+CALCIFEROL SERPL-MC: 76 UG/L (ref 30–80)

## 2021-08-24 ENCOUNTER — DOCUMENTATION ONLY (OUTPATIENT)
Dept: FAMILY MEDICINE | Facility: CLINIC | Age: 21
End: 2021-08-24

## 2021-08-24 NOTE — PROGRESS NOTES
Date: 8/19/2021    Today, Leonela comes into the clinic requesting IASTM on her R shoulder    Subjective: Tamera says her shoulder has been stiff and sore. IASTM has helped in the past so she requests that today    Objective: Stiffness and tightness over the anterior superior portion of the arm and shoulder    Action: IASTM o anterior superior portion of the arm and shoulder    Plan: Leonela will RTC as needed for shoulder pain

## 2021-09-28 ENCOUNTER — OFFICE VISIT (OUTPATIENT)
Dept: ORTHOPEDICS | Facility: CLINIC | Age: 21
End: 2021-09-28
Payer: COMMERCIAL

## 2021-09-28 VITALS — WEIGHT: 116 LBS | HEIGHT: 64 IN | BODY MASS INDEX: 19.81 KG/M2

## 2021-09-28 DIAGNOSIS — Z98.890 S/P SHOULDER SURGERY: Primary | ICD-10-CM

## 2021-09-28 PROCEDURE — 99213 OFFICE O/P EST LOW 20 MIN: CPT | Performed by: ORTHOPAEDIC SURGERY

## 2021-09-28 ASSESSMENT — MIFFLIN-ST. JEOR: SCORE: 1273.23

## 2021-09-28 NOTE — LETTER
9/28/2021         RE: Leonela Mclean  [address] [facility] [id number]        Dear Colleague,    Thank you for referring your patient, Leonela Mclean, to the [facility] [place]. Please see a copy of my visit note below.    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  She is not had any instability.  She is complaining of anterior shoulder pain.  She does have some occasional catching in her shoulder.  Her previous posterior instability has improved.    Physical Examination:  21-year-old female alert oriented no apparent distress.  She has full and symmetrical range of motion of her shoulders.  Her rotator cuff strength is 5/5 and symmetrical.    Evaluation of the right shoulder reveals slight pain in the bicipital groove.  Nontender laterally.  She has no apprehension.  No posterior apprehension.  She has no posterior load-and-shift.  She has a 2+ anterior load-and-shift.  She has a positive [place]'s.  She has a markedly positive crank test.    Impression:  1 year status post right shoulder posterior labral repair for posterior instability.  I do believe Leonela's posterior instability has improved.  I am a bit more concerned about her superior labrum and biceps tendon at this point.  We discussed rehabilitation.  We discussed a short course of anti-inflammatories to try to quiet her biceps tendon down.  We also discussed an intra-articular injection if needed.    Plan:  1. Leonela will continue to work with her  focusing on cuff and periscapular strengthening  2. I did provide her with a prescription for Voltaren.  Precautions were given.  3. If her symptoms make it hard for her to compete in National competition we would consider an  intra-articular injection later this winter.      Again, thank you for allowing me to participate in the care of your patient.        Sincerely,        Emiliano Alejandre MD

## 2021-10-02 NOTE — PROGRESS NOTES
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  She is not had any instability.  She is complaining of anterior shoulder pain.  She does have some occasional catching in her shoulder.  Her previous posterior instability has improved.    Physical Examination:  21-year-old female alert oriented no apparent distress.  She has full and symmetrical range of motion of her shoulders.  Her rotator cuff strength is 5/5 and symmetrical.    Evaluation of the right shoulder reveals slight pain in the bicipital groove.  Nontender laterally.  She has no apprehension.  No posterior apprehension.  She has no posterior load-and-shift.  She has a 2+ anterior load-and-shift.  She has a positive [place]'s.  She has a markedly positive crank test.    Impression:  1 year status post right shoulder posterior labral repair for posterior instability.  I do believe Leonela's posterior instability has improved.  I am a bit more concerned about her superior labrum and biceps tendon at this point.  We discussed rehabilitation.  We discussed a short course of anti-inflammatories to try to quiet her biceps tendon down.  We also discussed an intra-articular injection if needed.    Plan:  1. Leonela will continue to work with her  focusing on cuff and periscapular strengthening  2. I did provide her with a prescription for Voltaren.  Precautions were given.  3. If her symptoms make it hard for her to compete in National competition we would consider an intra-articular injection later this winter.

## 2021-10-10 ENCOUNTER — HEALTH MAINTENANCE LETTER (OUTPATIENT)
Age: 21
End: 2021-10-10

## 2021-10-13 ENCOUNTER — OFFICE VISIT (OUTPATIENT)
Dept: ORTHOPEDICS | Facility: CLINIC | Age: 21
End: 2021-10-13
Payer: COMMERCIAL

## 2021-10-13 VITALS
HEIGHT: 64 IN | WEIGHT: 109 LBS | HEART RATE: 38 BPM | BODY MASS INDEX: 18.61 KG/M2 | DIASTOLIC BLOOD PRESSURE: 58 MMHG | SYSTOLIC BLOOD PRESSURE: 96 MMHG

## 2021-10-13 DIAGNOSIS — R00.1 BRADYCARDIA: ICD-10-CM

## 2021-10-13 DIAGNOSIS — N91.1 AMENORRHEA, SECONDARY: Primary | ICD-10-CM

## 2021-10-13 DIAGNOSIS — R53.83 OTHER FATIGUE: ICD-10-CM

## 2021-10-13 DIAGNOSIS — R63.4 LOSS OF WEIGHT: ICD-10-CM

## 2021-10-13 ASSESSMENT — ANXIETY QUESTIONNAIRES
1. FEELING NERVOUS, ANXIOUS, OR ON EDGE: SEVERAL DAYS
6. BECOMING EASILY ANNOYED OR IRRITABLE: NOT AT ALL
2. NOT BEING ABLE TO STOP OR CONTROL WORRYING: NOT AT ALL
7. FEELING AFRAID AS IF SOMETHING AWFUL MIGHT HAPPEN: NOT AT ALL
5. BEING SO RESTLESS THAT IT IS HARD TO SIT STILL: NOT AT ALL
3. WORRYING TOO MUCH ABOUT DIFFERENT THINGS: NOT AT ALL
GAD7 TOTAL SCORE: 1

## 2021-10-13 ASSESSMENT — MIFFLIN-ST. JEOR: SCORE: 1241.48

## 2021-10-13 ASSESSMENT — PATIENT HEALTH QUESTIONNAIRE - PHQ9
5. POOR APPETITE OR OVEREATING: NOT AT ALL
SUM OF ALL RESPONSES TO PHQ QUESTIONS 1-9: 2

## 2021-10-13 NOTE — LETTER
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 She started training for a 1/2 marathon last summer and noticed weight loss that she attributed to not fueling as well as she should have. However, she stopped running and has continued to have weight loss and now notes more fatigue. Her max weight was 152 in 2019 and now she is down to 109 lbs. She says she has low energy and finds it hard to do usual things. She does not like that she has lost muscle mass. She does not think she has any issues with body image or an eating disorder, but notes that her parents have wondered about orthorexia. She thinks she doesn't eat enough carbs, but tries to get a good variety of other things in her diet. Doesn't eat dairy as it makes her feel bad and does not like to eat much red meat, but will eat white meat and tuna. She tries to eat nuts everyday and will eat a lot of fruits and veggies. She doesn't think she is trying to restrict calories. She has not met with a nutritionist.    Her last period was in May. She has not had any recent bony stress injuries, though did have shin splints in high school.   She denies visual changes, fever, jitteriness, bowel or bladder changes (except some occ constipation). She has felt dizzy at cheer practice.       Eating disorder SYMPTOMS:   Restricting:  yes  Binging: no  Purging:  no  Diet Pills: no  Laxatives: no  Diuretics:no  Syrup of Ipecac:no     Eating Behaviors:  Counting calories:  no  Counting grams of fat:  no  Primarily ingest fat free foods: no  Variety in diet: yes  Preference to eat alone: no  Practice vegetarianism: no     Exercise:  Days per week: daily  Exercise to  "affect shape or weight: unclear  Type of exercise and duration:  cheer, currently not running     Eating Patterns:  BREAKFAST: 4 egg whites with veggies  MORNING SNACK:  smoothie  LUNCH:  deli turkey, no bread, carrots, rice cake with PB, banana  AFTERNOON SNACK:  Rice Thins  DINNER:  veggies/meat  EVENING SNACK:    FLUIDS: water 3-4 L per day  AVOIDS:  carbs, red meat, dairy (doesn't sit well)     Symptoms related to eating:   nausea:  none  abdominal pain:  none  gas/bloating:  none  diarrhea:  none  vomiting:  none     Weight History:  Current weight: 109 (down from 124 on 7/24/21 and 116 on 8/5/21)  Current height:  5'3.5\"  Highest weight:   10/2019 152  Lowest weight:  109      Does not weigh herself, no goal weight, though thinks she should be closer to 120-130 lbs.    Menstrual History:  Date of last menstrual period: May 2021  Menses regular monthly:  no  On hormone medication/BCP:  no  Menarche at age: 15    Bone Health History:  Previous stress fracture: no but does have h/o \"shin splints\"  Previous acute fracture: no  Prior DEXA: no      SOCIAL HISTORY:     Denies EtOH, Tobacco, Drugs                 PHQ-9:   PHQ-9 (Pfizer) 8/3/2020 10/13/2021   1.  Little interest or pleasure in doing things 0 0   2.  Feeling down, depressed, or hopeless 0 0   3.  Trouble falling or staying asleep, or sleeping too much 0 0   4.  Feeling tired or having little energy 0 1   5.  Poor appetite or overeating 0 0   6.  Feeling bad about yourself 0 0   7.  Trouble concentrating 0 0   8.  Moving slowly or restless 0 1   9.  Suicidal or self-harm thoughts 0 0   PHQ-9 Total Score 0 2       TOM-7:      TOM-7   Pf[employer], 2002; Used with Permission) 8/3/2020 10/13/2021   1. Feeling nervous, anxious, or on edge 0 1   2. Not being able to stop or control worrying 0 0   3. Worrying too much about different things 0 0   4. Trouble relaxing 0 0   5. Being so restless that it is hard to sit still 0 0   6. Becoming easily annoyed or " "irritable 0 0   7. Feeling afraid, as if something awful might happen 0 0   TOM-7 Total Score 0 1               Medications:     Current Outpatient Medications   Medication Sig     diclofenac (VOLTAREN) 50 MG EC tablet Take 1 tablet (50 mg) by mouth 2 times daily as needed for moderate pain     diclofenac (VOLTAREN) 50 MG EC tablet TAKE 1 TABLET (50 MG) BY MOUTH 2 TIMES DAILY AS NEEDED FOR MODERATE PAIN     loratadine (CLARITIN) 10 MG tablet Take 10 mg by mouth daily     No current facility-administered medications for this visit.             Allergies:    No Known Allergies         Review of Systems:   A comprehensive 10 point review of systems (constitutional, ENT, cardiac, peripheral vascular, respiratory, GI, , Musculoskeletal, skin, Neurological) was performed and found to be negative except as described in this note.           Physical Exam:   COMPLETE EXAMINATION:   VITAL SIGNS: BP 96/58   Pulse (!) 38   Ht 1.613 m (5' 3.5\")   Wt 49.4 kg (109 lb)   BMI 19.01 kg/m    GEN: Alert, well-nourished, and in no distress.   HEENT:   Head: Normocephalic and atraumatic.   Eyes: PERRLA, EOMI  NEURO: Alert and oriented to person, place, and time. Gait normal. SKIN: No rash, warmth or erythema  PSYCH: Mood, memory, affect and judgment normal.     Neglected to perform more thorough exam today - will do so at our next follow up        Assessment:     Weight loss/fatigue/probable eating disorder - R/O metabolic abnormality  Secondary amenorrhea/Hypothalamic amenorrhea  Bradycardia        Plan:     1. Discussed diagnosis, concept of female athlete triad/RED-s and treatment strategy. She has had a concerning amount of weight loss and is significantly under fueling. Therefore, I recommended complete restriction from participation with [employer]ias until she is demonstrating consistent weight gain and adhering to treatment plan.  2. Screening labs, ECG, DEXA  3. Nutrition consult ASAP.  4. Referral to [facility]ya ASAP.  5. " Discussed importance of meal regularity, adequacy, variety, eating socially and spontaneously. Introduced ideas of intuitive eating, body trust and that nourishing mind and body is essential for overall health as well as sports performance. Ensure 3 meals and 2 snacks per day and add in additional smoothie or nutrient dense snack each day.  6. Obtain old growth charts to establish goal weight range (minimum target weight likely closer to 130 or 140+).  7. Follow up in 1-2 weeks.    Debora Shaffer M.D.    SHI Morton was present for the entire visit.

## 2021-10-13 NOTE — PROGRESS NOTES
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 She started training for a 1/2 marathon last summer and noticed weight loss that she attributed to not fueling as well as she should have. However, she stopped running and has continued to have weight loss and now notes more fatigue. Her max weight was 152 in 2019 and now she is down to 109 lbs. She says she has low energy and finds it hard to do usual things. She does not like that she has lost muscle mass. She does not think she has any issues with body image or an eating disorder, but notes that her parents have wondered about orthorexia. She thinks she doesn't eat enough carbs, but tries to get a good variety of other things in her diet. Doesn't eat dairy as it makes her feel bad and does not like to eat much red meat, but will eat white meat and tuna. She tries to eat nuts everyday and will eat a lot of fruits and veggies. She doesn't think she is trying to restrict calories. She has not met with a nutritionist.    Her last period was in May. She has not had any recent bony stress injuries, though did have shin splints in high school.   She denies visual changes, fever, jitteriness, bowel or bladder changes (except some occ constipation). She has felt dizzy at cheer practice.       Eating disorder SYMPTOMS:   Restricting:  yes  Binging: no  Purging:  no  Diet Pills: no  Laxatives: no  Diuretics:no  Syrup of Ipecac:no     Eating Behaviors:  Counting calories:  no  Counting grams of fat:  no  Primarily ingest fat free foods: no  Variety in diet: yes  Preference to eat alone: no  Practice vegetarianism: no     Exercise:  Days per week: daily  Exercise to affect shape or weight: unclear  Type of exercise and duration:  cheer, currently not  "running     Eating Patterns:  BREAKFAST: 4 egg whites with veggies  MORNING SNACK:  smoothie  LUNCH:  deli turkey, no bread, carrots, rice cake with PB, banana  AFTERNOON SNACK:  Rice Thins  DINNER:  veggies/meat  EVENING SNACK:    FLUIDS: water 3-4 L per day  AVOIDS:  carbs, red meat, dairy (doesn't sit well)     Symptoms related to eating:   nausea:  none  abdominal pain:  none  gas/bloating:  none  diarrhea:  none  vomiting:  none     Weight History:  Current weight: 109 (down from 124 on 7/24/21 and 116 on 8/5/21)  Current height:  5'3.5\"  Highest weight:   10/2019 152  Lowest weight:  109      Does not weigh herself, no goal weight, though thinks she should be closer to 120-130 lbs.    Menstrual History:  Date of last menstrual period: May 2021  Menses regular monthly:  no  On hormone medication/BCP:  no  Menarche at age: 15    Bone Health History:  Previous stress fracture: no but does have h/o \"shin splints\"  Previous acute fracture: no  Prior DEXA: no      SOCIAL HISTORY:     Denies EtOH, Tobacco, Drugs                 PHQ-9:   PHQ-9 ([employer]) 8/3/2020 10/13/2021   1.  Little interest or pleasure in doing things 0 0   2.  Feeling down, depressed, or hopeless 0 0   3.  Trouble falling or staying asleep, or sleeping too much 0 0   4.  Feeling tired or having little energy 0 1   5.  Poor appetite or overeating 0 0   6.  Feeling bad about yourself 0 0   7.  Trouble concentrating 0 0   8.  Moving slowly or restless 0 1   9.  Suicidal or self-harm thoughts 0 0   PHQ-9 Total Score 0 2       TOM-7:      TOM-7   [employer], 2002; Used with Permission) 8/3/2020 10/13/2021   1. Feeling nervous, anxious, or on edge 0 1   2. Not being able to stop or control worrying 0 0   3. Worrying too much about different things 0 0   4. Trouble relaxing 0 0   5. Being so restless that it is hard to sit still 0 0   6. Becoming easily annoyed or irritable 0 0   7. Feeling afraid, as if something awful might happen 0 0   TOM-7 Total " "Score 0 1               Medications:     Current Outpatient Medications   Medication Sig     diclofenac (VOLTAREN) 50 MG EC tablet Take 1 tablet (50 mg) by mouth 2 times daily as needed for moderate pain     diclofenac (VOLTAREN) 50 MG EC tablet TAKE 1 TABLET (50 MG) BY MOUTH 2 TIMES DAILY AS NEEDED FOR MODERATE PAIN     loratadine (CLARITIN) 10 MG tablet Take 10 mg by mouth daily     No current facility-administered medications for this visit.             Allergies:    No Known Allergies         Review of Systems:   A comprehensive 10 point review of systems (constitutional, ENT, cardiac, peripheral vascular, respiratory, GI, , Musculoskeletal, skin, Neurological) was performed and found to be negative except as described in this note.           Physical Exam:   COMPLETE EXAMINATION:   VITAL SIGNS: BP 96/58   Pulse (!) 38   Ht 1.613 m (5' 3.5\")   Wt 49.4 kg (109 lb)   BMI 19.01 kg/m    GEN: Alert, appears thin, and in no distress.   HEENT:   Head: Normocephalic and atraumatic.   Eyes: PERRLA, EOMI  NEURO: Alert and oriented to person, place, and time. Gait normal.   SKIN: No rash, warmth or erythema  PSYCH: Mood, memory, affect and judgment normal.     Neglected to perform more thorough exam today - will do so at our next follow up        Assessment:     Weight loss/fatigue/probable eating disorder - R/O metabolic abnormality  Secondary amenorrhea/Hypothalamic amenorrhea  Bradycardia        Plan:     1. Discussed diagnosis, concept of female athlete triad/RED-s and treatment strategy. She has had a concerning amount of weight loss and is significantly under fueling. Therefore, I recommended complete restriction from participation with [employer]ias until she is demonstrating consistent weight gain and adhering to treatment plan.  2. Screening labs, ECG, DEXA  3. Nutrition consult ASAP.  4. Referral to Anya ASAP.  5. Discussed importance of meal regularity, adequacy, variety, eating socially and " spontaneously. Introduced ideas of intuitive eating, body trust and that nourishing mind and body is essential for overall health as well as sports performance. Ensure 3 meals and 2 snacks per day and add in additional smoothie or nutrient dense snack each day.  6. Obtain old growth charts to establish goal weight range (minimum target weight likely closer to 130 or 140+).  7. Follow up in 1-2 weeks.    Debora Shaffer M.D.    SHI Morton was present for the entire visit.

## 2021-10-14 ASSESSMENT — ANXIETY QUESTIONNAIRES: GAD7 TOTAL SCORE: 1

## 2021-10-15 ENCOUNTER — ANCILLARY PROCEDURE (OUTPATIENT)
Dept: BONE DENSITY | Facility: CLINIC | Age: 21
End: 2021-10-15
Attending: PEDIATRICS
Payer: COMMERCIAL

## 2021-10-15 ENCOUNTER — OFFICE VISIT (OUTPATIENT)
Dept: ORTHOPEDICS | Facility: CLINIC | Age: 21
End: 2021-10-15
Payer: COMMERCIAL

## 2021-10-15 ENCOUNTER — LAB (OUTPATIENT)
Dept: LAB | Facility: CLINIC | Age: 21
End: 2021-10-15
Payer: COMMERCIAL

## 2021-10-15 VITALS — WEIGHT: 109 LBS | HEIGHT: 64 IN | BODY MASS INDEX: 18.61 KG/M2

## 2021-10-15 DIAGNOSIS — R00.1 BRADYCARDIA, SEVERE SINUS: ICD-10-CM

## 2021-10-15 DIAGNOSIS — N91.1 AMENORRHEA, SECONDARY: ICD-10-CM

## 2021-10-15 DIAGNOSIS — F50.9 EATING DISORDER, UNSPECIFIED TYPE: Primary | ICD-10-CM

## 2021-10-15 DIAGNOSIS — R63.4 LOSS OF WEIGHT: ICD-10-CM

## 2021-10-15 LAB
ALBUMIN SERPL-MCNC: 4.2 G/DL (ref 3.4–5)
ALP SERPL-CCNC: 41 U/L (ref 40–150)
ALT SERPL W P-5'-P-CCNC: 70 U/L (ref 0–50)
ANION GAP SERPL CALCULATED.3IONS-SCNC: 3 MMOL/L (ref 3–14)
AST SERPL W P-5'-P-CCNC: 31 U/L (ref 0–45)
BASOPHILS # BLD AUTO: 0 10E3/UL (ref 0–0.2)
BASOPHILS NFR BLD AUTO: 1 %
BILIRUB SERPL-MCNC: 0.6 MG/DL (ref 0.2–1.3)
BUN SERPL-MCNC: 17 MG/DL (ref 7–30)
CALCIUM SERPL-MCNC: 9.1 MG/DL (ref 8.5–10.1)
CHLORIDE BLD-SCNC: 105 MMOL/L (ref 94–109)
CO2 SERPL-SCNC: 30 MMOL/L (ref 20–32)
CREAT SERPL-MCNC: 1.02 MG/DL (ref 0.52–1.04)
DEPRECATED CALCIDIOL+CALCIFEROL SERPL-MC: 89 UG/L (ref 20–75)
EOSINOPHIL # BLD AUTO: 0 10E3/UL (ref 0–0.7)
EOSINOPHIL NFR BLD AUTO: 1 %
ERYTHROCYTE [DISTWIDTH] IN BLOOD BY AUTOMATED COUNT: 13.2 % (ref 10–15)
ESTRADIOL SERPL-MCNC: 19 PG/ML
FERRITIN SERPL-MCNC: 210 NG/ML (ref 12–150)
GFR SERPL CREATININE-BSD FRML MDRD: 79 ML/MIN/1.73M2
GLUCOSE BLD-MCNC: 85 MG/DL (ref 70–99)
HCT VFR BLD AUTO: 39 % (ref 35–47)
HGB BLD-MCNC: 13.1 G/DL (ref 11.7–15.7)
IMM GRANULOCYTES # BLD: 0 10E3/UL
IMM GRANULOCYTES NFR BLD: 0 %
LH SERPL-ACNC: <0.2 IU/L
LYMPHOCYTES # BLD AUTO: 1.5 10E3/UL (ref 0.8–5.3)
LYMPHOCYTES NFR BLD AUTO: 46 %
MCH RBC QN AUTO: 32.8 PG (ref 26.5–33)
MCHC RBC AUTO-ENTMCNC: 33.6 G/DL (ref 31.5–36.5)
MCV RBC AUTO: 98 FL (ref 78–100)
MONOCYTES # BLD AUTO: 0.2 10E3/UL (ref 0–1.3)
MONOCYTES NFR BLD AUTO: 8 %
NEUTROPHILS # BLD AUTO: 1.4 10E3/UL (ref 1.6–8.3)
NEUTROPHILS NFR BLD AUTO: 44 %
NRBC # BLD AUTO: 0 10E3/UL
NRBC BLD AUTO-RTO: 0 /100
PLATELET # BLD AUTO: 201 10E3/UL (ref 150–450)
POTASSIUM BLD-SCNC: 4.1 MMOL/L (ref 3.4–5.3)
PROLACTIN SERPL-MCNC: 7 UG/L (ref 3–27)
PROT SERPL-MCNC: 7.1 G/DL (ref 6.8–8.8)
RBC # BLD AUTO: 4 10E6/UL (ref 3.8–5.2)
SODIUM SERPL-SCNC: 138 MMOL/L (ref 133–144)
T3FREE SERPL-MCNC: 1.1 PG/ML (ref 2.3–4.2)
TSH SERPL DL<=0.005 MIU/L-ACNC: 2 MU/L (ref 0.4–4)
WBC # BLD AUTO: 3.1 10E3/UL (ref 4–11)

## 2021-10-15 PROCEDURE — 77080 DXA BONE DENSITY AXIAL: CPT

## 2021-10-15 PROCEDURE — 36415 COLL VENOUS BLD VENIPUNCTURE: CPT | Performed by: PATHOLOGY

## 2021-10-15 PROCEDURE — 83002 ASSAY OF GONADOTROPIN (LH): CPT | Performed by: PEDIATRICS

## 2021-10-15 PROCEDURE — 80050 GENERAL HEALTH PANEL: CPT | Performed by: PATHOLOGY

## 2021-10-15 PROCEDURE — 84146 ASSAY OF PROLACTIN: CPT | Performed by: PEDIATRICS

## 2021-10-15 PROCEDURE — 82670 ASSAY OF TOTAL ESTRADIOL: CPT | Performed by: PEDIATRICS

## 2021-10-15 PROCEDURE — 82306 VITAMIN D 25 HYDROXY: CPT | Performed by: PEDIATRICS

## 2021-10-15 PROCEDURE — 84481 FREE ASSAY (FT-3): CPT | Performed by: PEDIATRICS

## 2021-10-15 PROCEDURE — 82728 ASSAY OF FERRITIN: CPT | Performed by: PATHOLOGY

## 2021-10-15 ASSESSMENT — MIFFLIN-ST. JEOR: SCORE: 1241.48

## 2021-10-15 NOTE — LETTER
"   10/15/2021      RE: Leonela Mclean  [address] 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 She asks if she could still participate in team lift. She still says that she does not think her weight loss was intentional or that she is restricting certain foods. 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 No other new concerns.          Medications:     Current Outpatient Medications   Medication Sig     diclofenac (VOLTAREN) 50 MG EC tablet Take 1 tablet (50 mg) by mouth 2 times daily as needed for moderate pain     diclofenac (VOLTAREN) 50 MG EC tablet TAKE 1 TABLET (50 MG) BY MOUTH 2 TIMES DAILY AS NEEDED FOR MODERATE PAIN     loratadine (CLARITIN) 10 MG tablet Take 10 mg by mouth daily (Patient not taking: Reported on 10/15/2021)     No current facility-administered medications for this visit.             Allergies:    No Known Allergies         Review of Systems:   A comprehensive 10 point review of systems (constitutional, ENT, cardiac, peripheral vascular, respiratory, GI, , Musculoskeletal, skin, Neurological) was performed and found to be negative except as described in this note.           Physical Exam:   COMPLETE EXAMINATION:   VITAL SIGNS: Ht 1.613 m (5' 3.5\")   Wt 49.4 kg (109 lb)   BMI 19.01 kg/m   P38  GEN: Alert, thin, tearful at times  HEENT:   Head: Normocephalic and atraumatic.   Eyes: PERRLA, EOMI  Nose: no congestion or discharge  Ears: TM's normal, external canals normal  Mouth/Throat: MMM, No erythema or exudate.   Neck: supple, no lymphadenopathy  CV: S1S2 normal, marked bradycardia, no murmur  CHEST: CTA, Easy effort, No rales or wheezes  ABD: Soft. " "Nontender/nondistended, no HSM/mass, no rebound/guarding.  NEURO: Alert and oriented to person, place, and time. Gait normal. Coordination normal.  Normal reflexes. No cranial nerve deficit.   SKIN: No rash, warmth or erythema, +orange discoloration c/w beta carotenemia, lanugo  PSYCH: Mood, memory, affect and judgment normal.   MSK: minimal muscle mass and subcutaneous tissue    Prior weight at 15.5 years = 136 lbs  Weight on 3/23/21 = 140    Wt Readings from Last 3 Encounters:   10/15/21 49.4 kg (109 lb)   10/13/21 49.4 kg (109 lb)   09/28/21 52.6 kg (116 lb)     Ht Readings from Last 2 Encounters:   10/15/21 1.613 m (5' 3.5\")   10/13/21 1.613 m (5' 3.5\")     Facility age limit for growth percentiles is 20 years.         Imaging:     DEXA:  Impression  The most negative and valid Z-score of 0.2 at the level of the L1 - L2 lumbar spine is WITHIN normal range according to WHO criteria for premenopausal females and men age less than 50.     Results   Lumbar spine (L1-L2)  Z-score 0.2 , BMD 1.127 g/cm2.      Left Femur neck  Z-score 0.6 , BMD 1.099 g/cm2.  Left Total femur  Z-score 1.1 , BMD 1.102 g/cm2.     Right Femur neck  Z-score 0.3, BMD  1.045 g/cm2.  Right Total femur  Z-score 0.8 , BMD  1.076 g/cm2.     .  Interval change  No prior study available for comparisonRef 3     Fracture risk   Fracture risk calculation is not indicated. Ref.4   Low bone density is not the only risk factor for fracture; consider factors such as patient's age, fall risk, injury risk, previous osteoporotic fracture, family history of osteoporosis, etc.       Repeat  Recommend repeat DXA i age 65, or sooner, as clinically indicated.     Clinical correlation recommended      Technical quality  Satisfactory.   Abnormal vertebrae may be excluded from analysis if there is more than a 1.0 T-score difference between the vertebrae in question and adjacent vertebrae. Note the wide range in BMD values and T-scores within the lumbar spine. In the " circumstances, the lumbar spine is represented by L1-L2        Principal result :  Faviola Madera MD, CCD  Division of Endocrinology and Diabetes    Department of Medicine           Labs:     Component      Latest Ref Rng & Units 10/15/2021   WBC      4.0 - 11.0 10e3/uL 3.1 (L)   RBC Count      3.80 - 5.20 10e6/uL 4.00   Hemoglobin      11.7 - 15.7 g/dL 13.1   Hematocrit      35.0 - 47.0 % 39.0   MCV      78 - 100 fL 98   MCH      26.5 - 33.0 pg 32.8   MCHC      31.5 - 36.5 g/dL 33.6   RDW      10.0 - 15.0 % 13.2   Platelet Count      150 - 450 10e3/uL 201   % Neutrophils      % 44   % Lymphocytes      % 46   % Monocytes      % 8   % Eosinophils      % 1   % Basophils      % 1   % Immature Granulocytes      % 0   NRBCs per 100 WBC      <1 /100 0   Absolute Neutrophils      1.6 - 8.3 10e3/uL 1.4 (L)   Absolute Lymphocytes      0.8 - 5.3 10e3/uL 1.5   Absolute Monocytes      0.0 - 1.3 10e3/uL 0.2   Absolute Eosinophils      0.0 - 0.7 10e3/uL 0.0   Absolute Basophils      0.0 - 0.2 10e3/uL 0.0   Absolute Immature Granulocytes      <=0.0 10e3/uL 0.0   Absolute NRBCs      10e3/uL 0.0   Sodium      133 - 144 mmol/L 138   Potassium      3.4 - 5.3 mmol/L 4.1   Chloride      94 - 109 mmol/L 105   Carbon Dioxide      20 - 32 mmol/L 30   Anion Gap      3 - 14 mmol/L 3   Urea Nitrogen      7 - 30 mg/dL 17   Creatinine      0.52 - 1.04 mg/dL 1.02   Calcium      8.5 - 10.1 mg/dL 9.1   Glucose      70 - 99 mg/dL 85   Alkaline Phosphatase      40 - 150 U/L 41   AST      0 - 45 U/L 31   ALT      0 - 50 U/L 70 (H)   Protein Total      6.8 - 8.8 g/dL 7.1   Albumin      3.4 - 5.0 g/dL 4.2   Bilirubin Total      0.2 - 1.3 mg/dL 0.6   GFR Estimate      >60 mL/min/1.73m2 79   TSH      0.40 - 4.00 mU/L 2.00   Free T3      2.3 - 4.2 pg/mL 1.1 (L)   Ferritin      12 - 150 ng/mL 210 (H)   Estradiol      pg/mL 19   Lutropin      IU/L <0.2   Vitamin D Deficiency screening      20 - 75 ug/L 89 (H)   Prolactin      3 - 27 ug/L 7      ECG:  Marked sinus bradycardia HR 32, rightward axis, T wave amplitude decreased compared to previous         Assessment:     Eating Disorder with marked recent weight loss (31 lb since March) and the following associated conditions:  -Secondary amenorrhea  -Fatigue  -Marked bradycardia  -Neutropenia  -Low T3  -Elevated ALT  -Low LH        Plan:     1. We again discussed the diagnosis and treatment strategy, importance of weight restoration, adequate energy intake, and risks of malnutrition.  2. Anya intake ASAP. She may also wish to consult with Cathie Sonal. This will likely take several months to establish care there. In the meantime, we will continue to closely follow her. Recommended consultation with a nutritionist ASAP  3. Ensure 3 meals per day with all 5 food groups, 2 snacks per day and add additional calories with nutrient dense food, including carbs, fats, protein.  4. We will develop a formal treatment plan with the following goals: weight restoration (goal weight range 130-140+), normalization of vitals and labs, resume menses, attend medical appointments. Once we are seeing consistent improvement, we can start adding back in activity as long as energy intake meets demands (this will likely take several weeks to months).   5. At this time, she is NOT cleared to actively participate in any [employer] team practice, performance, exercise or lift.   6. Follow up in 2 weeks, sooner prn. (She may have her parents join us for the next appointment).    Debora Shaffer M.D.    ATC was not present for the entire visit.        Debora Shaffer MD

## 2021-10-16 NOTE — PROGRESS NOTES
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 She asks if she could still participate in team lift. She still says that she does not think her weight loss was intentional or that she is restricting certain foods. 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 No other new concerns.          Medications:     Current Outpatient Medications   Medication Sig     diclofenac (VOLTAREN) 50 MG EC tablet Take 1 tablet (50 mg) by mouth 2 times daily as needed for moderate pain     diclofenac (VOLTAREN) 50 MG EC tablet TAKE 1 TABLET (50 MG) BY MOUTH 2 TIMES DAILY AS NEEDED FOR MODERATE PAIN     loratadine (CLARITIN) 10 MG tablet Take 10 mg by mouth daily (Patient not taking: Reported on 10/15/2021)     No current facility-administered medications for this visit.             Allergies:    No Known Allergies         Review of Systems:   A comprehensive 10 point review of systems (constitutional, ENT, cardiac, peripheral vascular, respiratory, GI, , Musculoskeletal, skin, Neurological) was performed and found to be negative except as described in this note.           Physical Exam:   COMPLETE EXAMINATION:   VITAL SIGNS: Ht 1.613 m (5' 3.5\")   Wt 49.4 kg (109 lb)   BMI 19.01 kg/m   P38  GEN: Alert, thin, tearful at times  HEENT:   Head: Normocephalic and atraumatic.   Eyes: PERRLA, EOMI  Nose: no congestion or discharge  Ears: TM's normal, external canals normal  Mouth/Throat: MMM, No erythema or exudate.   Neck: supple, no lymphadenopathy  CV: S1S2 normal, marked bradycardia, no murmur  CHEST: CTA, Easy effort, No rales or wheezes  ABD: Soft. Nontender/nondistended, no HSM/mass, no rebound/guarding.  NEURO: Alert and oriented to person, " "place, and time. Gait normal. Coordination normal.  Normal reflexes. No cranial nerve deficit.   SKIN: No rash, warmth or erythema, +orange discoloration c/w beta carotenemia, lanugo  PSYCH: Mood, memory, affect and judgment normal.   MSK: minimal muscle mass and subcutaneous tissue    Prior weight at 15.5 years = 136 lbs  Weight on 3/23/21 = 140    Wt Readings from Last 3 Encounters:   10/15/21 49.4 kg (109 lb)   10/13/21 49.4 kg (109 lb)   09/28/21 52.6 kg (116 lb)     Ht Readings from Last 2 Encounters:   10/15/21 1.613 m (5' 3.5\")   10/13/21 1.613 m (5' 3.5\")     Facility age limit for growth percentiles is 20 years.         Imaging:     DEXA:  Impression  The most negative and valid Z-score of 0.2 at the level of the L1 - L2 lumbar spine is WITHIN normal range according to WHO criteria for premenopausal females and men age less than 50.     Results   Lumbar spine (L1-L2)  Z-score 0.2 , BMD 1.127 g/cm2.      Left Femur neck  Z-score 0.6 , BMD 1.099 g/cm2.  Left Total femur  Z-score 1.1 , BMD 1.102 g/cm2.     Right Femur neck  Z-score 0.3, BMD  1.045 g/cm2.  Right Total femur  Z-score 0.8 , BMD  1.076 g/cm2.     .  Interval change  No prior study available for comparisonRef 3     Fracture risk   Fracture risk calculation is not indicated. Ref.4   Low bone density is not the only risk factor for fracture; consider factors such as patient's age, fall risk, injury risk, previous osteoporotic fracture, family history of osteoporosis, etc.       Repeat  Recommend repeat DXA i age 65, or sooner, as clinically indicated.     Clinical correlation recommended      Technical quality  Satisfactory.   Abnormal vertebrae may be excluded from analysis if there is more than a 1.0 T-score difference between the vertebrae in question and adjacent vertebrae. Note the wide range in BMD values and T-scores within the lumbar spine. In the circumstances, the lumbar spine is represented by L1-L2        Principal result " :  Faviola Madera MD, [facility]  Division of Endocrinology and Diabetes    Department of Medicine           Labs:     Component      Latest Ref Rng & Units 10/15/2021   WBC      4.0 - 11.0 10e3/uL 3.1 (L)   RBC Count      3.80 - 5.20 10e6/uL 4.00   Hemoglobin      11.7 - 15.7 g/dL 13.1   Hematocrit      35.0 - 47.0 % 39.0   MCV      78 - 100 fL 98   MCH      26.5 - 33.0 pg 32.8   MCHC      31.5 - 36.5 g/dL 33.6   RDW      10.0 - 15.0 % 13.2   Platelet Count      150 - 450 10e3/uL 201   % Neutrophils      % 44   % Lymphocytes      % 46   % Monocytes      % 8   % Eosinophils      % 1   % Basophils      % 1   % Immature Granulocytes      % 0   NRBCs per 100 WBC      <1 /100 0   Absolute Neutrophils      1.6 - 8.3 10e3/uL 1.4 (L)   Absolute Lymphocytes      0.8 - 5.3 10e3/uL 1.5   Absolute Monocytes      0.0 - 1.3 10e3/uL 0.2   Absolute Eosinophils      0.0 - 0.7 10e3/uL 0.0   Absolute Basophils      0.0 - 0.2 10e3/uL 0.0   Absolute Immature Granulocytes      <=0.0 10e3/uL 0.0   Absolute NRBCs      10e3/uL 0.0   Sodium      133 - 144 mmol/L 138   Potassium      3.4 - 5.3 mmol/L 4.1   Chloride      94 - 109 mmol/L 105   Carbon Dioxide      20 - 32 mmol/L 30   Anion Gap      3 - 14 mmol/L 3   Urea Nitrogen      7 - 30 mg/dL 17   Creatinine      0.52 - 1.04 mg/dL 1.02   Calcium      8.5 - 10.1 mg/dL 9.1   Glucose      70 - 99 mg/dL 85   Alkaline Phosphatase      40 - 150 U/L 41   AST      0 - 45 U/L 31   ALT      0 - 50 U/L 70 (H)   Protein Total      6.8 - 8.8 g/dL 7.1   Albumin      3.4 - 5.0 g/dL 4.2   Bilirubin Total      0.2 - 1.3 mg/dL 0.6   GFR Estimate      >60 mL/min/1.73m2 79   TSH      0.40 - 4.00 mU/L 2.00   Free T3      2.3 - 4.2 pg/mL 1.1 (L)   Ferritin      12 - 150 ng/mL 210 (H)   Estradiol      pg/mL 19   Lutropin      IU/L <0.2   Vitamin D Deficiency screening      20 - 75 ug/L 89 (H)   Prolactin      3 - 27 ug/L 7     ECG:  Marked sinus bradycardia HR 32, rightward axis, T wave amplitude  decreased compared to previous         Assessment:     Eating Disorder with marked recent weight loss (31 lb since March) and the following associated conditions:  -Secondary amenorrhea  -Fatigue  -Marked bradycardia  -Neutropenia  -Low T3  -Elevated ALT  -Low LH        Plan:     1. We again discussed the diagnosis and treatment strategy, importance of weight restoration, adequate energy intake, and risks of malnutrition.  2. Anya intake ASAP. She may also wish to consult with Cathie Sonal. This will likely take several months to establish care there. In the meantime, we will continue to closely follow her. Recommended consultation with a nutritionist ASAP  3. Ensure 3 meals per day with all 5 food groups, 2 snacks per day and add additional calories with nutrient dense food, including carbs, fats, protein.  4. We will develop a formal treatment plan with the following goals: weight restoration (goal weight range 130-140+), normalization of vitals and labs, resume menses, attend medical appointments. Once we are seeing consistent improvement, we can start adding back in activity as long as energy intake meets demands (this will likely take several weeks to months).   5. At this time, she is NOT cleared to actively participate in any [employer] team practice, performance, exercise or lift.   6. Follow up in 2 weeks, sooner prn. (She may have her parents join us for the next appointment).    Debora Shaffer M.D.    SHI was not present for the entire visit.

## 2021-10-18 LAB
ATRIAL RATE - MUSE: 32 BPM
DIASTOLIC BLOOD PRESSURE - MUSE: NORMAL MMHG
INTERPRETATION ECG - MUSE: NORMAL
P AXIS - MUSE: 72 DEGREES
PR INTERVAL - MUSE: 160 MS
QRS DURATION - MUSE: 96 MS
QT - MUSE: 478 MS
QTC - MUSE: 348 MS
R AXIS - MUSE: 90 DEGREES
SYSTOLIC BLOOD PRESSURE - MUSE: NORMAL MMHG
T AXIS - MUSE: 65 DEGREES
VENTRICULAR RATE- MUSE: 32 BPM

## 2021-10-27 ENCOUNTER — OFFICE VISIT (OUTPATIENT)
Dept: ORTHOPEDICS | Facility: CLINIC | Age: 21
End: 2021-10-27
Payer: COMMERCIAL

## 2021-10-27 VITALS
SYSTOLIC BLOOD PRESSURE: 105 MMHG | HEART RATE: 54 BPM | BODY MASS INDEX: 19.45 KG/M2 | HEIGHT: 63 IN | WEIGHT: 109.8 LBS | DIASTOLIC BLOOD PRESSURE: 59 MMHG

## 2021-10-27 DIAGNOSIS — N91.1 AMENORRHEA, SECONDARY: ICD-10-CM

## 2021-10-27 DIAGNOSIS — R53.83 OTHER FATIGUE: ICD-10-CM

## 2021-10-27 DIAGNOSIS — R00.1 BRADYCARDIA: ICD-10-CM

## 2021-10-27 DIAGNOSIS — F50.9 EATING DISORDER, UNSPECIFIED TYPE: Primary | ICD-10-CM

## 2021-10-27 DIAGNOSIS — R63.4 LOSS OF WEIGHT: ICD-10-CM

## 2021-10-27 ASSESSMENT — MIFFLIN-ST. JEOR: SCORE: 1237.18

## 2021-10-27 NOTE — PROGRESS NOTES
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 Since our last visit, she has discussed treatment advice with her parents and they decided that she did not need to seek care at an eating disorder program like [facility] as I had recommended. She can't articulate the reason for this other than to say they didn't feel like it was warranted as she does not think she has an eating disorder. Instead, she made an appointment to see a nutritionist at the [facility]. She has been feeling ok. No new symptoms. She has kept a meal log and is trying to eat more carbs.           Medications:     Current Outpatient Medications   Medication Sig     diclofenac (VOLTAREN) 50 MG EC tablet Take 1 tablet (50 mg) by mouth 2 times daily as needed for moderate pain     diclofenac (VOLTAREN) 50 MG EC tablet TAKE 1 TABLET (50 MG) BY MOUTH 2 TIMES DAILY AS NEEDED FOR MODERATE PAIN     loratadine (CLARITIN) 10 MG tablet Take 10 mg by mouth daily (Patient not taking: Reported on 10/15/2021)     No current facility-administered medications for this visit.             Allergies:    No Known Allergies         Review of Systems:   A comprehensive 10 point review of systems (constitutional, ENT, cardiac, peripheral vascular, respiratory, GI, , Musculoskeletal, skin, Neurological) was performed and found to be negative except as described in this note.           Physical Exam:   COMPLETE EXAMINATION:   VITAL SIGNS: /59   Pulse 54   Ht 1.6 m (5' 3\")   Wt 49.8 kg (109 lb 12.8 oz)   BMI 19.45 kg/m       Orthostatics:  Supine: 95/59 HR 43  Standing 1 min: 98/55 HR 64   Standing 3 min: 98/53 HR 69    GEN: Alert, undernourished, and in no distress.   NEURO: Alert and oriented to person, place, and " time.   PSYCH: Mood, memory, affect and judgment normal. Poor insight          Assessment:     Eating Disorder with marked recent weight loss (31 lb since March) and the following associated conditions:  -Hypothalamic amenorrhea  -Fatigue  -Marked bradycardia (improved today)  -Neutropenia  -Low T3  -Elevated ALT  -Low LH  -Beta carotenemia        Plan:     1. Discussed diagnoses and treatment recommendations again with Leonela today. We discussed that this is a serious issue impacting her current and long term health. 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 She denies any body image concerns or body weight goals and thinks that she is not purposefully restricting. However, given the constellation of symptoms, vitals, exam, labs, and significant weight loss without other explanation, there has been persistent lack of energy availability with her hyper focus on healthy eating and rigid food options leading to malnourishment. Our goals will be to adequately nourish her body and restore weight, but also to get to the root of why this happened. To do so, this will take time and a multi-disciplinary team of experts including a medical doctor, nutritionist, and psychologist/psychiatrist. Therefore, I still recommend prompt consultation with [facility] and this will be one of the requirements for future participation with [employer].   2. A formal treatment plan was created so that treatment goals are clearly stated and clear communication can occur. This was given to Leonela to review. If she is in agreement with pursuing treatment and following this plan, we can sign this together at our next appointment and a copy will be place in media tab.    Treatment goals include: weight restoration, resumption of menses, normalization of labs (free T3, LH, ALT, WBC),  normalization of vitals (HR >45, normal ECG) and attendance at all scheduled appointments  here and at [facility]  3. Restriction from participation from [facility] until there has been consistent improvement in above measures.   4. Follow up weekly with ATC for weight and vital checks.  5. Follow up in 2 weeks. Will repeat labs, ECG in 4 weeks.    Debora Shaffer M.D.    ATC Paula Morton was present for the entire visit.

## 2021-10-27 NOTE — LETTER
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 Since our last visit, she has discussed treatment advice with her parents and they decided that she did not need to seek care at an eating disorder program like [facility] as I had recommended. She can't articulate the reason for this other than to say they didn't feel like it was warranted as she does not think she has an eating disorder. Instead, she made an appointment to see a nutritionist at the [facility]. She has been feeling ok. No new symptoms. She has kept a meal log and is trying to eat more carbs.           Medications:     Current Outpatient Medications   Medication Sig     diclofenac (VOLTAREN) 50 MG EC tablet Take 1 tablet (50 mg) by mouth 2 times daily as needed for moderate pain     diclofenac (VOLTAREN) 50 MG EC tablet TAKE 1 TABLET (50 MG) BY MOUTH 2 TIMES DAILY AS NEEDED FOR MODERATE PAIN     loratadine (CLARITIN) 10 MG tablet Take 10 mg by mouth daily (Patient not taking: Reported on 10/15/2021)     No current facility-administered medications for this visit.             Allergies:    No Known Allergies         Review of Systems:   A comprehensive 10 point review of systems (constitutional, ENT, cardiac, peripheral vascular, respiratory, GI, , Musculoskeletal, skin, Neurological) was performed and found to be negative except as described in this note.           Physical Exam:   COMPLETE EXAMINATION:   VITAL SIGNS: /59   Pulse 54   Ht 1.6 m (5' 3\")   Wt 49.8 kg (109 lb 12.8 oz)   BMI 19.45 kg/m       Orthostatics:  Supine: 95/59 HR 43  Standing 1 min: 98/55 HR 64   Standing 3 min: 98/53 HR 69    GEN: Alert, " undernourished, and in no distress.   NEURO: Alert and oriented to person, place, and time.   PSYCH: Mood, memory, affect and judgment normal. Poor insight          Assessment:     Eating Disorder with marked recent weight loss (31 lb since March) and the following associated conditions:  -Hypothalamic amenorrhea  -Fatigue  -Marked bradycardia (improved today)  -Neutropenia  -Low T3  -Elevated ALT  -Low LH  -Beta carotenemia        Plan:     1. Discussed diagnoses and treatment recommendations again with Leonela today. We discussed that this is a serious issue impacting her current and long term health. 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 She denies any body image concerns or body weight goals and thinks that she is not purposefully restricting. However, given the constellation of symptoms, vitals, exam, labs, and significant weight loss without other explanation, there has been persistent lack of energy availability with her hyper focus on healthy eating and rigid food options leading to malnourishment. Our goals will be to adequately nourish her body and restore weight, but also to get to the root of why this happened. To do so, this will take time and a multi-disciplinary team of experts including a medical doctor, nutritionist, and psychologist/psychiatrist. Therefore, I still recommend prompt consultation with [facility] and this will be one of the requirements for future participation with [employer].   2. A formal treatment plan was created so that treatment goals are clearly stated and clear communication can occur. This was given to Leonela to review. If she is in agreement with pursuing treatment and following this plan, we can sign this together at our next appointment and a copy will be place in media tab.    Treatment goals include: weight restoration, resumption of menses, normalization of labs (free T3, LH, ALT, WBC),   normalization of vitals (HR >45, normal ECG) and attendance at all scheduled appointments here and at [place]  3. Restriction from participation from [facility] until there has been consistent improvement in above measures.   4. Follow up weekly with ATC for weight and vital checks.  5. Follow up in 2 weeks. Will repeat labs, ECG in 4 weeks.    Debora Shaffer M.D.    ATC Paula Morton was present for the entire visit.        Debora Shaffer MD

## 2021-11-10 ENCOUNTER — OFFICE VISIT (OUTPATIENT)
Dept: ORTHOPEDICS | Facility: CLINIC | Age: 21
End: 2021-11-10
Payer: COMMERCIAL

## 2021-11-10 VITALS — HEIGHT: 63 IN | WEIGHT: 112 LBS | BODY MASS INDEX: 19.84 KG/M2

## 2021-11-10 DIAGNOSIS — F50.9 EATING DISORDER, UNSPECIFIED TYPE: Primary | ICD-10-CM

## 2021-11-10 ASSESSMENT — MIFFLIN-ST. JEOR: SCORE: 1247.16

## 2021-11-10 NOTE — LETTER
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 Since our last visit, she has been consistently incorporating more carbs in to her diet and ensuring 3 meals and 2 snacks per day. Her weight is up 3 #. She made appointment with [facility], but first available is not until January. She plans on keeping her appt with nutritionist at the [facility] in December. Her dad got her a scale for her to check her weight at home. She has not been participating in cheer. Mood is good. Sleep ok. She is feeling better and her energy level is increasing. She has some questions about the formal treatment plan which were answered today. She asks if she can participate in the 4th quarter of the game against [place] performing just arm moves, grounded with no tumbling or flying.           Medications:     Current Outpatient Medications   Medication Sig     diclofenac (VOLTAREN) 50 MG EC tablet Take 1 tablet (50 mg) by mouth 2 times daily as needed for moderate pain     diclofenac (VOLTAREN) 50 MG EC tablet TAKE 1 TABLET (50 MG) BY MOUTH 2 TIMES DAILY AS NEEDED FOR MODERATE PAIN     loratadine (CLARITIN) 10 MG tablet Take 10 mg by mouth daily (Patient not taking: Reported on 10/15/2021)     No current facility-administered medications for this visit.             Allergies:    No Known Allergies         Review of Systems:   A comprehensive 10 point review of systems (constitutional, ENT, cardiac, peripheral vascular, respiratory, GI, , Musculoskeletal, skin, Neurological) was performed and found to be negative except as described in this note.           Physical Exam:   COMPLETE EXAMINATION:   VITAL SIGNS: Ht 1.6 m (5' 3\")   Wt 50.8 " kg (112 lb)   BMI 19.84 kg/m       Orthostatics:  Supine 100/55 46  Seated 97/54   69  Standing 96/52 70    GEN: Alert, thin, and in no distress.   HEENT:   Head: Normocephalic and atraumatic.   Eyes: PERRLA, EOMI  Nose: no congestion or discharge  Mouth/Throat: MMM, No erythema or exudate.   Neck: supple, no lymphadenopathy  CV: S1S2 normal, RRR, no murmur  CHEST: CTA, Easy effort, No rales or wheezes  ABD: Soft. Nontender/nondistended, no HSM/mass, no rebound/guarding.  NEURO: Alert and oriented to person, place, and time. Gait normal.   SKIN: No rash, warmth or erythema; betacarotenemia and lanugo still present  PSYCH: Mood, memory, affect and judgment normal.           Assessment:     Eating disorder NOS        Plan:     1. Congratulated Leonela on the steps she has taken towards renourishing her body.  2. Recommended not weighing herself at home daily. Only weekly weight checks here in training room, along with BP and HR.  3. Continue to ensure increased calories, increased carbs and 3 meals per day with 2 snacks.  4. She will keep nutritionist consult with [facility] in December and also see if she can get on waitlist for a sooner appt with [facility].  5. Ok to participate in the 4th quarter of the [employer] game at the end of November with arm movements only - no tumbling or flying. Otherwise, she is still restricted from participation. Once she continues to show improvement in weight and vitals and commitment to treatment plan, we can start to add back in some participation.  6. She would like to review the treatment plan with her parents again before signing it.  7. Repeat labs and ECG prior to next visit in 4 weeks.      Debora Shaffer M.D.    SHI Morton was present for the entire visit.        Debora Shaffer MD

## 2021-11-10 NOTE — PROGRESS NOTES
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 Since our last visit, she has been consistently incorporating more carbs in to her diet and ensuring 3 meals and 2 snacks per day. Her weight is up 3 #. She made appointment with [facility], but first available is not until January. She plans on keeping her appt with nutritionist at the [facility] in December. Her dad got her a scale for her to check her weight at home. She has not been participating in cheer. Mood is good. Sleep ok. She is feeling better and her energy level is increasing. She has some questions about the formal treatment plan which were answered today. She asks if she can participate in the 4th quarter of the game against [place] performing just arm moves, grounded with no tumbling or flying.           Medications:     Current Outpatient Medications   Medication Sig     diclofenac (VOLTAREN) 50 MG EC tablet Take 1 tablet (50 mg) by mouth 2 times daily as needed for moderate pain     diclofenac (VOLTAREN) 50 MG EC tablet TAKE 1 TABLET (50 MG) BY MOUTH 2 TIMES DAILY AS NEEDED FOR MODERATE PAIN     loratadine (CLARITIN) 10 MG tablet Take 10 mg by mouth daily (Patient not taking: Reported on 10/15/2021)     No current facility-administered medications for this visit.             Allergies:    No Known Allergies         Review of Systems:   A comprehensive 10 point review of systems (constitutional, ENT, cardiac, peripheral vascular, respiratory, GI, , Musculoskeletal, skin, Neurological) was performed and found to be negative except as described in this note.           Physical Exam:   COMPLETE EXAMINATION:   VITAL SIGNS: Ht 1.6 m (5' 3\")   Wt 50.8 kg (112 lb)   BMI 19.84 kg/m       Orthostatics:  Supine 100/55 46  Seated 97/54   " 69  Standing 96/52 70    GEN: Alert, thin, and in no distress.   HEENT:   Head: Normocephalic and atraumatic.   Eyes: PERRLA, EOMI  Nose: no congestion or discharge  Mouth/Throat: MMM, No erythema or exudate.   Neck: supple, no lymphadenopathy  CV: S1S2 normal, RRR, no murmur  CHEST: CTA, Easy effort, No rales or wheezes  ABD: Soft. Nontender/nondistended, no HSM/mass, no rebound/guarding.  NEURO: Alert and oriented to person, place, and time. Gait normal.   SKIN: No rash, warmth or erythema; betacarotenemia and lanugo still present  PSYCH: Mood, memory, affect and judgment normal.           Assessment:     Eating disorder NOS        Plan:     1. Congratulated Leonela on the steps she has taken towards renourishing her body.  2. Recommended not weighing herself at home daily. Only weekly weight checks here in training room, along with BP and HR.  3. Continue to ensure increased calories, increased carbs and 3 meals per day with 2 snacks.  4. She will keep nutritionist consult with [facility] in December and also see if she can get on waitlist for a sooner appt with [facility].  5. Ok to participate in the 4th quarter of the [employer] game at the end of November with arm movements only - no tumbling or flying. Otherwise, she is still restricted from participation. Once she continues to show improvement in weight and vitals and commitment to treatment plan, we can start to add back in some participation.  6. She would like to review the treatment plan with her parents again before signing it.  7. Repeat labs and ECG prior to next visit in 4 weeks.      Debora Shaffer M.D.    SHI Morton was present for the entire visit.

## 2021-11-17 ENCOUNTER — OFFICE VISIT (OUTPATIENT)
Dept: ORTHOPEDICS | Facility: CLINIC | Age: 21
End: 2021-11-17
Payer: COMMERCIAL

## 2021-11-17 VITALS
DIASTOLIC BLOOD PRESSURE: 65 MMHG | BODY MASS INDEX: 19.41 KG/M2 | SYSTOLIC BLOOD PRESSURE: 110 MMHG | HEART RATE: 51 BPM | WEIGHT: 109.6 LBS

## 2021-11-17 DIAGNOSIS — Z98.890 S/P SHOULDER SURGERY: Primary | ICD-10-CM

## 2021-11-17 NOTE — LETTER
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  She has been struggling with anterior shoulder pain.  In addition her shoulder feels a bit unstable.  She feels that her shoulder is loose in the front.  She points to her biceps groove is a source of her pain.    Physical Examination:  21-year-old female alert oriented no apparent distress.  She has full and symmetrical range of motion.  Supraspinatus, external rotator, subscapularis strength is 5/5.  She has no AC joint tenderness.  Markedly tender in the biceps groove.    Positive Fountain's.  Positive crank test.  Negative crossarm.  Positive apprehension.  Positive relocation.  No posterior apprehension.  Scattered 3 a anterior load.  However this is symmetrical.  However her right side results in symptoms.  She has a negative posterior load.    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  And then I had a long conversation about her shoulder tonight.  She is not happy with her shoulder function.  I do think it would be helpful to know the status of her labrum.  Think this would help us decide if we should do a anterior stabilization procedure as well as a biceps tenodesis.  I do think a biceps tenodesis is the right option given her persistent anterior shoulder pain.    Plan:  1. We will obtain a right shoulder MRI with intra-articular gadolinium to them evaluate her superior and anterior labrum in addition we can look for biceps tendinitis.  2. Follow-up with me in 2 weeks to review the results of her MRI and discuss her treatment plan.    Emiliano Alejandre MD

## 2021-11-18 DIAGNOSIS — Z11.59 ENCOUNTER FOR SCREENING FOR OTHER VIRAL DISEASES: ICD-10-CM

## 2021-11-18 DIAGNOSIS — M25.511 PAIN IN JOINT OF RIGHT SHOULDER: Primary | ICD-10-CM

## 2021-11-18 NOTE — PROGRESS NOTES
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  She has been struggling with anterior shoulder pain.  In addition her shoulder feels a bit unstable.  She feels that her shoulder is loose in the front.  She points to her biceps groove is a source of her pain.    Physical Examination:  21-year-old female alert oriented no apparent distress.  She has full and symmetrical range of motion.  Supraspinatus, external rotator, subscapularis strength is 5/5.  She has no AC joint tenderness.  Markedly tender in the biceps groove.    Positive Knapp's.  Positive crank test.  Negative crossarm.  Positive apprehension.  Positive relocation.  No posterior apprehension.  Scattered 3 a anterior load.  However this is symmetrical.  However her right side results in symptoms.  She has a negative posterior load.    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  And then I had a long conversation about her shoulder tonight.  She is not happy with her shoulder function.  I do think it would be helpful to know the status of her labrum.  Think this would help us decide if we should do a anterior stabilization procedure as well as a biceps tenodesis.  I do think a biceps tenodesis is the right option given her persistent anterior shoulder pain.    Plan:  1. We will obtain a right shoulder MRI with intra-articular gadolinium to them evaluate her superior and anterior labrum in addition we can look for biceps tendinitis.  2. Follow-up with me in 2 weeks to review the results of her MRI and discuss her treatment plan.

## 2021-11-22 DIAGNOSIS — M25.511 CHRONIC RIGHT SHOULDER PAIN: Primary | ICD-10-CM

## 2021-11-22 DIAGNOSIS — G89.29 CHRONIC RIGHT SHOULDER PAIN: Primary | ICD-10-CM

## 2021-11-26 ENCOUNTER — TELEPHONE (OUTPATIENT)
Dept: ORTHOPEDICS | Facility: CLINIC | Age: 21
End: 2021-11-26
Payer: COMMERCIAL

## 2021-11-26 NOTE — TELEPHONE ENCOUNTER
[facility] Call Center    Phone Message    May a detailed message be left on voicemail: yes     Reason for Call: Order(s): Other:   Reason for requested: MRI order for shoulder  Date needed: 11/26  Provider name:     Action Taken: Other: [facility]    Travel Screening: Not Applicable

## 2021-11-26 NOTE — TELEPHONE ENCOUNTER
Called patient and spoke with her about the MRI. She stated that she was scheduled for it through our facility but it was cancelled due to no radiologist being available for that day. She stated that her Trainer was going to check [facility]. I explained that I could fax the MRI order over to them right now so that when the  called they can just get it scheduled. Patient was agreeable to the plan and thanked me for the call. I faxed the MRI order over to [facility].

## 2021-12-01 ENCOUNTER — OFFICE VISIT (OUTPATIENT)
Dept: ORTHOPEDICS | Facility: CLINIC | Age: 21
End: 2021-12-01
Payer: COMMERCIAL

## 2021-12-01 VITALS
HEART RATE: 47 BPM | BODY MASS INDEX: 19.84 KG/M2 | DIASTOLIC BLOOD PRESSURE: 62 MMHG | SYSTOLIC BLOOD PRESSURE: 116 MMHG | WEIGHT: 112 LBS | HEIGHT: 63 IN

## 2021-12-01 DIAGNOSIS — F50.9 EATING DISORDER, UNSPECIFIED TYPE: Primary | ICD-10-CM

## 2021-12-01 ASSESSMENT — MIFFLIN-ST. JEOR: SCORE: 1247.16

## 2021-12-01 ASSESSMENT — PATIENT HEALTH QUESTIONNAIRE - PHQ9: 5. POOR APPETITE OR OVEREATING: NOT AT ALL

## 2021-12-01 ASSESSMENT — ANXIETY QUESTIONNAIRES
3. WORRYING TOO MUCH ABOUT DIFFERENT THINGS: NOT AT ALL
1. FEELING NERVOUS, ANXIOUS, OR ON EDGE: NOT AT ALL
GAD7 TOTAL SCORE: 0
5. BEING SO RESTLESS THAT IT IS HARD TO SIT STILL: NOT AT ALL
2. NOT BEING ABLE TO STOP OR CONTROL WORRYING: NOT AT ALL
7. FEELING AFRAID AS IF SOMETHING AWFUL MIGHT HAPPEN: NOT AT ALL
6. BECOMING EASILY ANNOYED OR IRRITABLE: NOT AT ALL

## 2021-12-01 NOTE — PROGRESS NOTES
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 Since our last visit, she has continued to ensure good caloric intake and trying to add more nut butters, protein shakes and carbs throughout the day. Her energy level is better. Despite this, she thinks she hasn't gained much weight. She did cheer the last 5 minutes of the [place] game and felt ok. No dizziness. No chest pain, palpitations, SOB, or other symptoms. Mood is good. Nutritionist appointment at [facility] got pushed back to 12/13/21. Anya appt at end of January.     Recently reevaluated by Dr. Alejandre for persistent shoulder pain. Plan for repeat MR arthrogram and considering biceps tenodesis or shoulder stabilization.          Medications:     Current Outpatient Medications   Medication Sig     diclofenac (VOLTAREN) 50 MG EC tablet Take 1 tablet (50 mg) by mouth 2 times daily as needed for moderate pain (Patient not taking: Reported on 11/17/2021)     diclofenac (VOLTAREN) 50 MG EC tablet TAKE 1 TABLET (50 MG) BY MOUTH 2 TIMES DAILY AS NEEDED FOR MODERATE PAIN     loratadine (CLARITIN) 10 MG tablet Take 10 mg by mouth daily (Patient not taking: Reported on 10/15/2021)     No current facility-administered medications for this visit.             Allergies:    No Known Allergies         Review of Systems:   A comprehensive 10 point review of systems (constitutional, ENT, cardiac, peripheral vascular, respiratory, GI, , Musculoskeletal, skin, Neurological) was performed and found to be negative except as described in this note.           Physical Exam:   COMPLETE EXAMINATION:   VITAL SIGNS: /62   Pulse (!) 47   Ht 1.6 m (5' 3\")   Wt 50.8 kg (112 lb)   BMI 19.84 kg/m      Wt Readings from Last 3 Encounters:   12/01/21 50.8 kg (112 lb)   11/17/21 49.7 " kg (109 lb 9.6 oz)   11/10/21 50.8 kg (112 lb)       GEN: Alert, thin appearing, and in no distress.   HEENT:   Head: Normocephalic and atraumatic.   Eyes: PERRLA, EOMI  Nose: no congestion or discharge  Ears: TM's normal, external canals normal  Mouth/Throat: MMM, No erythema or exudate.   Neck: supple, no lymphadenopathy  CV: S1S2 normal, Regular rhythm, bradycardic, no murmur  CHEST: CTA, Easy effort, No rales or wheezes  ABD: Soft. Nontender/nondistended, no HSM/mass, no rebound/guarding.  NEURO: Alert and oriented to person, place, and time.   SKIN: No rash, warmth or erythema + beta carotenemia is present but less apparent than previous, lanugo  PSYCH: Mood, memory, affect and judgment normal.         Labs:     Repeat labs not obtained yet        Assessment:     1. Eating disorder NOS - 3 # weight gain  -Bradycardia -improving, hypotension improving  -Hypothalamic amenorrhea  -Fatigue - improving  -Neutropenia  -Low T3  -Elevated ALT  -Low LH  -Beta carotenemia  2. Shoulder pain        Plan:     1. Congratulated Leonela on her efforts so far. Continue to ensure adequate caloric intake with all food groups. Nutrition consult will occur soon. Still want her to keep Anya intake appointment in January.  2. Repeat labs as ordered.  3. Discussed with Leonela, that we need to ensure she is medically stable and nutritionally replete prior to another surgery to ensure her safety with anesthesia and ability to wound heal.   4. She is still restricted from sport until further improvement is seen, however she may cheer in the 4th quarter of the [employer] game, with no stunting or flying.      Debora Shaffer M.D.    SHI Morton was present for the entire visit.

## 2021-12-01 NOTE — LETTER
"  12/1/2021      RE: Leonela Mclean  [address] [address] [address] 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 Since our last visit, she has continued to ensure good caloric intake and trying to add more nut butters, protein shakes and carbs throughout the day. Her energy level is better. Despite this, she thinks she hasn't gained much weight. She did cheer the last 5 minutes of the [place] game and felt ok. No dizziness. No chest pain, palpitations, SOB, or other symptoms. Mood is good. Nutritionist appointment at [facility] got pushed back to 12/13/21. Anya appt at end of January.     Recently reevaluated by Dr. Alejandre for persistent shoulder pain. Plan for repeat MR arthrogram and considering biceps tenodesis or shoulder stabilization.          Medications:     Current Outpatient Medications   Medication Sig     diclofenac (VOLTAREN) 50 MG EC tablet Take 1 tablet (50 mg) by mouth 2 times daily as needed for moderate pain (Patient not taking: Reported on 11/17/2021)     diclofenac (VOLTAREN) 50 MG EC tablet TAKE 1 TABLET (50 MG) BY MOUTH 2 TIMES DAILY AS NEEDED FOR MODERATE PAIN     loratadine (CLARITIN) 10 MG tablet Take 10 mg by mouth daily (Patient not taking: Reported on 10/15/2021)     No current facility-administered medications for this visit.             Allergies:    No Known Allergies         Review of Systems:   A comprehensive 10 point review of systems (constitutional, ENT, cardiac, peripheral vascular, respiratory, GI, , Musculoskeletal, skin, Neurological) was performed and found to be negative except as described in this note.           Physical Exam:   COMPLETE EXAMINATION:   VITAL SIGNS: /62   Pulse (!) 47   Ht 1.6 m (5' 3\")   Wt 50.8 kg (112 lb)   BMI 19.84 kg/m  "     Wt Readings from Last 3 Encounters:   12/01/21 50.8 kg (112 lb)   11/17/21 49.7 kg (109 lb 9.6 oz)   11/10/21 50.8 kg (112 lb)       GEN: Alert, thin appearing, and in no distress.   HEENT:   Head: Normocephalic and atraumatic.   Eyes: PERRLA, EOMI  Nose: no congestion or discharge  Ears: TM's normal, external canals normal  Mouth/Throat: MMM, No erythema or exudate.   Neck: supple, no lymphadenopathy  CV: S1S2 normal, Regular rhythm, bradycardic, no murmur  CHEST: CTA, Easy effort, No rales or wheezes  ABD: Soft. Nontender/nondistended, no HSM/mass, no rebound/guarding.  NEURO: Alert and oriented to person, place, and time.   SKIN: No rash, warmth or erythema + beta carotenemia is present but less apparent than previous, lanugo  PSYCH: Mood, memory, affect and judgment normal.         Labs:     Repeat labs not obtained yet        Assessment:     1. Eating disorder NOS - 3 # weight gain  -Bradycardia -improving, hypotension improving  -Hypothalamic amenorrhea  -Fatigue - improving  -Neutropenia  -Low T3  -Elevated ALT  -Low LH  -Beta carotenemia  2. Shoulder pain        Plan:     1. Congratulated Leonela on her efforts so far. Continue to ensure adequate caloric intake with all food groups. Nutrition consult will occur soon. Still want her to keep Anya intake appointment in January.  2. Repeat labs as ordered.  3. Discussed with Leonela, that we need to ensure she is medically stable and nutritionally replete prior to another surgery to ensure her safety with anesthesia and ability to wound heal.   4. She is still restricted from sport until further improvement is seen, however she may cheer in the 4th quarter of the [employer] game, with no stunting or flying.      Debora Shaffer M.D.    SHI Morton was present for the entire visit.        Debora Shaffer MD

## 2021-12-02 ENCOUNTER — TELEPHONE (OUTPATIENT)
Dept: ORTHOPEDICS | Facility: CLINIC | Age: 21
End: 2021-12-02
Payer: COMMERCIAL

## 2021-12-02 ASSESSMENT — PATIENT HEALTH QUESTIONNAIRE - PHQ9: SUM OF ALL RESPONSES TO PHQ QUESTIONS 1-9: 1

## 2021-12-02 ASSESSMENT — ANXIETY QUESTIONNAIRES: GAD7 TOTAL SCORE: 0

## 2021-12-02 NOTE — TELEPHONE ENCOUNTER
ATC spoke with Dr. Alejandre who confirmed patient's right shoulder MRI will need to be an arthrogram. ATC spoke with [facility] and confirmed order.     [facility] will contact patient to schedule MRI.    SHI Garcia

## 2021-12-02 NOTE — TELEPHONE ENCOUNTER
[facility] Call Center    Phone Message    May a detailed message be left on voicemail: yes     Reason for Call IA from [facility] CLARFICATION ABOUT IV CONTRAST Action Taken: Message routed to:  [facility] ([facility]): milena    Travel Screening: Not Applicable

## 2021-12-07 ENCOUNTER — ANCILLARY PROCEDURE (OUTPATIENT)
Dept: GENERAL RADIOLOGY | Facility: CLINIC | Age: 21
End: 2021-12-07
Attending: ORTHOPAEDIC SURGERY
Payer: COMMERCIAL

## 2021-12-07 ENCOUNTER — LAB (OUTPATIENT)
Dept: LAB | Facility: CLINIC | Age: 21
End: 2021-12-07
Payer: COMMERCIAL

## 2021-12-07 ENCOUNTER — ANCILLARY PROCEDURE (OUTPATIENT)
Dept: MRI IMAGING | Facility: CLINIC | Age: 21
End: 2021-12-07
Attending: ORTHOPAEDIC SURGERY
Payer: COMMERCIAL

## 2021-12-07 DIAGNOSIS — M25.511 CHRONIC RIGHT SHOULDER PAIN: ICD-10-CM

## 2021-12-07 DIAGNOSIS — M25.511 PAIN IN JOINT OF RIGHT SHOULDER: ICD-10-CM

## 2021-12-07 DIAGNOSIS — G89.29 CHRONIC RIGHT SHOULDER PAIN: ICD-10-CM

## 2021-12-07 DIAGNOSIS — F50.9 EATING DISORDER, UNSPECIFIED TYPE: ICD-10-CM

## 2021-12-07 DIAGNOSIS — D70.9 NEUTROPENIA, UNSPECIFIED TYPE (H): Primary | ICD-10-CM

## 2021-12-07 LAB
ANION GAP SERPL CALCULATED.3IONS-SCNC: 2 MMOL/L (ref 3–14)
BASOPHILS # BLD AUTO: 0 10E3/UL (ref 0–0.2)
BASOPHILS NFR BLD AUTO: 1 %
BUN SERPL-MCNC: 18 MG/DL (ref 7–30)
CALCIUM SERPL-MCNC: 9.1 MG/DL (ref 8.5–10.1)
CHLORIDE BLD-SCNC: 104 MMOL/L (ref 94–109)
CO2 SERPL-SCNC: 31 MMOL/L (ref 20–32)
CREAT SERPL-MCNC: 0.71 MG/DL (ref 0.52–1.04)
EOSINOPHIL # BLD AUTO: 0 10E3/UL (ref 0–0.7)
EOSINOPHIL NFR BLD AUTO: 1 %
ERYTHROCYTE [DISTWIDTH] IN BLOOD BY AUTOMATED COUNT: 11.9 % (ref 10–15)
GFR SERPL CREATININE-BSD FRML MDRD: >90 ML/MIN/1.73M2
GLUCOSE BLD-MCNC: 78 MG/DL (ref 70–99)
HCT VFR BLD AUTO: 40.3 % (ref 35–47)
HGB BLD-MCNC: 13.3 G/DL (ref 11.7–15.7)
IMM GRANULOCYTES # BLD: 0 10E3/UL
IMM GRANULOCYTES NFR BLD: 0 %
LH SERPL-ACNC: 0.9 IU/L
LYMPHOCYTES # BLD AUTO: 1 10E3/UL (ref 0.8–5.3)
LYMPHOCYTES NFR BLD AUTO: 33 %
MCH RBC QN AUTO: 32.7 PG (ref 26.5–33)
MCHC RBC AUTO-ENTMCNC: 33 G/DL (ref 31.5–36.5)
MCV RBC AUTO: 99 FL (ref 78–100)
MONOCYTES # BLD AUTO: 0.2 10E3/UL (ref 0–1.3)
MONOCYTES NFR BLD AUTO: 6 %
NEUTROPHILS # BLD AUTO: 1.8 10E3/UL (ref 1.6–8.3)
NEUTROPHILS NFR BLD AUTO: 59 %
NRBC # BLD AUTO: 0 10E3/UL
NRBC BLD AUTO-RTO: 0 /100
PLATELET # BLD AUTO: 246 10E3/UL (ref 150–450)
POTASSIUM BLD-SCNC: 4.1 MMOL/L (ref 3.4–5.3)
RBC # BLD AUTO: 4.07 10E6/UL (ref 3.8–5.2)
SODIUM SERPL-SCNC: 137 MMOL/L (ref 133–144)
T3FREE SERPL-MCNC: 1.6 PG/ML (ref 2.3–4.2)
WBC # BLD AUTO: 3 10E3/UL (ref 4–11)

## 2021-12-07 PROCEDURE — 73222 MRI JOINT UPR EXTREM W/DYE: CPT | Mod: RT | Performed by: RADIOLOGY

## 2021-12-07 PROCEDURE — 77002 NEEDLE LOCALIZATION BY XRAY: CPT | Performed by: RADIOLOGY

## 2021-12-07 PROCEDURE — 84450 TRANSFERASE (AST) (SGOT): CPT | Performed by: PATHOLOGY

## 2021-12-07 PROCEDURE — 87340 HEPATITIS B SURFACE AG IA: CPT | Performed by: PEDIATRICS

## 2021-12-07 PROCEDURE — A9585 GADOBUTROL INJECTION: HCPCS | Performed by: RADIOLOGY

## 2021-12-07 PROCEDURE — 36415 COLL VENOUS BLD VENIPUNCTURE: CPT | Performed by: PATHOLOGY

## 2021-12-07 PROCEDURE — 85025 COMPLETE CBC W/AUTO DIFF WBC: CPT | Performed by: PATHOLOGY

## 2021-12-07 PROCEDURE — 82607 VITAMIN B-12: CPT | Performed by: PATHOLOGY

## 2021-12-07 PROCEDURE — 80048 BASIC METABOLIC PNL TOTAL CA: CPT | Performed by: PATHOLOGY

## 2021-12-07 PROCEDURE — 84460 ALANINE AMINO (ALT) (SGPT): CPT | Performed by: PATHOLOGY

## 2021-12-07 PROCEDURE — 86664 EPSTEIN-BARR NUCLEAR ANTIGEN: CPT | Performed by: PEDIATRICS

## 2021-12-07 PROCEDURE — 87389 HIV-1 AG W/HIV-1&-2 AB AG IA: CPT | Performed by: PEDIATRICS

## 2021-12-07 PROCEDURE — 23350 INJECTION FOR SHOULDER X-RAY: CPT | Mod: RT | Performed by: RADIOLOGY

## 2021-12-07 PROCEDURE — 86803 HEPATITIS C AB TEST: CPT | Performed by: PEDIATRICS

## 2021-12-07 PROCEDURE — 86665 EPSTEIN-BARR CAPSID VCA: CPT | Performed by: PEDIATRICS

## 2021-12-07 PROCEDURE — 83002 ASSAY OF GONADOTROPIN (LH): CPT | Performed by: PEDIATRICS

## 2021-12-07 PROCEDURE — 86140 C-REACTIVE PROTEIN: CPT | Performed by: PATHOLOGY

## 2021-12-07 PROCEDURE — 84481 FREE ASSAY (FT-3): CPT | Performed by: PEDIATRICS

## 2021-12-07 PROCEDURE — 86706 HEP B SURFACE ANTIBODY: CPT | Performed by: PEDIATRICS

## 2021-12-07 RX ORDER — IOPAMIDOL 408 MG/ML
10 INJECTION, SOLUTION INTRATHECAL ONCE
Status: COMPLETED | OUTPATIENT
Start: 2021-12-07 | End: 2021-12-07

## 2021-12-07 RX ORDER — GADOBUTROL 604.72 MG/ML
7.5 INJECTION INTRAVENOUS ONCE
Status: COMPLETED | OUTPATIENT
Start: 2021-12-07 | End: 2021-12-07

## 2021-12-07 RX ORDER — LIDOCAINE HYDROCHLORIDE 10 MG/ML
30 INJECTION, SOLUTION EPIDURAL; INFILTRATION; INTRACAUDAL; PERINEURAL ONCE
Status: COMPLETED | OUTPATIENT
Start: 2021-12-07 | End: 2021-12-07

## 2021-12-07 RX ADMIN — LIDOCAINE HYDROCHLORIDE 8 ML: 10 INJECTION, SOLUTION EPIDURAL; INFILTRATION; INTRACAUDAL; PERINEURAL at 09:29

## 2021-12-07 RX ADMIN — GADOBUTROL 0.1 ML: 604.72 INJECTION INTRAVENOUS at 09:29

## 2021-12-07 RX ADMIN — IOPAMIDOL 8 ML: 408 INJECTION, SOLUTION INTRATHECAL at 09:28

## 2021-12-08 ENCOUNTER — DOCUMENTATION ONLY (OUTPATIENT)
Dept: FAMILY MEDICINE | Facility: CLINIC | Age: 21
End: 2021-12-08
Payer: COMMERCIAL

## 2021-12-08 DIAGNOSIS — F50.9 EATING DISORDER, UNSPECIFIED TYPE: Primary | ICD-10-CM

## 2021-12-08 DIAGNOSIS — D70.9 NEUTROPENIA, UNSPECIFIED TYPE (H): ICD-10-CM

## 2021-12-08 LAB
ALT SERPL W P-5'-P-CCNC: 45 U/L (ref 0–50)
AST SERPL W P-5'-P-CCNC: 22 U/L (ref 0–45)
CRP SERPL-MCNC: <2.9 MG/L (ref 0–8)
EBV NA IGG SER IA-ACNC: <3 U/ML
EBV NA IGG SER IA-ACNC: NORMAL [IU]/ML
EBV VCA IGG SER IA-ACNC: <10 U/ML
EBV VCA IGG SER IA-ACNC: NORMAL
EBV VCA IGM SER IA-ACNC: <10 U/ML
EBV VCA IGM SER IA-ACNC: NORMAL
HBV SURFACE AB SERPL IA-ACNC: 0 M[IU]/ML
HBV SURFACE AG SERPL QL IA: NONREACTIVE
HCV AB SERPL QL IA: NONREACTIVE
HIV 1+2 AB+HIV1 P24 AG SERPL QL IA: NONREACTIVE
VIT B12 SERPL-MCNC: 1920 PG/ML (ref 193–986)

## 2021-12-09 NOTE — PROGRESS NOTES
Date: 12/8/2021    Met with Leonela today to do an updated check for vitals.     BP: 100/61  Pulse: 65  Weight:  109.6 lbs with shoes on    Leonela was talking during the first blood pressure so we retook it and she sat still and quiet:    BP: 95/60  Pulse: 63    Leonela was also asked to stop taking her Vitamin B12 supplement because her level was at 1920 which was about double the normal levels.

## 2021-12-14 ENCOUNTER — LAB (OUTPATIENT)
Dept: LAB | Facility: CLINIC | Age: 21
End: 2021-12-14
Payer: COMMERCIAL

## 2021-12-14 DIAGNOSIS — F50.9 EATING DISORDER, UNSPECIFIED TYPE: ICD-10-CM

## 2021-12-14 DIAGNOSIS — D70.9 NEUTROPENIA, UNSPECIFIED TYPE (H): ICD-10-CM

## 2021-12-14 LAB
BASOPHILS # BLD AUTO: 0 10E3/UL (ref 0–0.2)
BASOPHILS NFR BLD AUTO: 1 %
EOSINOPHIL # BLD AUTO: 0 10E3/UL (ref 0–0.7)
EOSINOPHIL NFR BLD AUTO: 1 %
ERYTHROCYTE [DISTWIDTH] IN BLOOD BY AUTOMATED COUNT: 12 % (ref 10–15)
ERYTHROCYTE [SEDIMENTATION RATE] IN BLOOD BY WESTERGREN METHOD: 7 MM/HR (ref 0–20)
FOLATE SERPL-MCNC: 16.6 NG/ML
HCT VFR BLD AUTO: 38.8 % (ref 35–47)
HGB BLD-MCNC: 13 G/DL (ref 11.7–15.7)
IMM GRANULOCYTES # BLD: 0 10E3/UL
IMM GRANULOCYTES NFR BLD: 0 %
LDH SERPL L TO P-CCNC: 173 U/L (ref 81–234)
LYMPHOCYTES # BLD AUTO: 0.6 10E3/UL (ref 0.8–5.3)
LYMPHOCYTES NFR BLD AUTO: 19 %
MCH RBC QN AUTO: 33 PG (ref 26.5–33)
MCHC RBC AUTO-ENTMCNC: 33.5 G/DL (ref 31.5–36.5)
MCV RBC AUTO: 99 FL (ref 78–100)
MONOCYTES # BLD AUTO: 0.2 10E3/UL (ref 0–1.3)
MONOCYTES NFR BLD AUTO: 8 %
NEUTROPHILS # BLD AUTO: 2.3 10E3/UL (ref 1.6–8.3)
NEUTROPHILS NFR BLD AUTO: 71 %
NRBC # BLD AUTO: 0 10E3/UL
NRBC BLD AUTO-RTO: 0 /100
PLATELET # BLD AUTO: 179 10E3/UL (ref 150–450)
RBC # BLD AUTO: 3.94 10E6/UL (ref 3.8–5.2)
RETICS # AUTO: 0.04 10E6/UL (ref 0.03–0.1)
RETICS/RBC NFR AUTO: 0.9 % (ref 0.5–2)
WBC # BLD AUTO: 3.2 10E3/UL (ref 4–11)

## 2021-12-14 PROCEDURE — 36415 COLL VENOUS BLD VENIPUNCTURE: CPT | Performed by: PATHOLOGY

## 2021-12-14 PROCEDURE — 85025 COMPLETE CBC W/AUTO DIFF WBC: CPT | Performed by: PATHOLOGY

## 2021-12-14 PROCEDURE — 85652 RBC SED RATE AUTOMATED: CPT | Performed by: PATHOLOGY

## 2021-12-14 PROCEDURE — 85045 AUTOMATED RETICULOCYTE COUNT: CPT | Performed by: PATHOLOGY

## 2021-12-14 PROCEDURE — 82746 ASSAY OF FOLIC ACID SERUM: CPT | Performed by: PEDIATRICS

## 2021-12-14 PROCEDURE — 83615 LACTATE (LD) (LDH) ENZYME: CPT | Performed by: PATHOLOGY

## 2021-12-15 ENCOUNTER — OFFICE VISIT (OUTPATIENT)
Dept: ORTHOPEDICS | Facility: CLINIC | Age: 21
End: 2021-12-15
Payer: COMMERCIAL

## 2021-12-15 VITALS
SYSTOLIC BLOOD PRESSURE: 104 MMHG | BODY MASS INDEX: 19.67 KG/M2 | DIASTOLIC BLOOD PRESSURE: 62 MMHG | HEART RATE: 57 BPM | HEIGHT: 63 IN | WEIGHT: 111 LBS

## 2021-12-15 DIAGNOSIS — Z98.890 S/P SHOULDER SURGERY: Primary | ICD-10-CM

## 2021-12-15 LAB
PATH REPORT.COMMENTS IMP SPEC: NORMAL
PATH REPORT.COMMENTS IMP SPEC: NORMAL
PATH REPORT.FINAL DX SPEC: NORMAL
PATH REPORT.MICROSCOPIC SPEC OTHER STN: NORMAL
PATH REPORT.MICROSCOPIC SPEC OTHER STN: NORMAL
PATH REPORT.RELEVANT HX SPEC: NORMAL

## 2021-12-15 ASSESSMENT — MIFFLIN-ST. JEOR: SCORE: 1242.62

## 2021-12-15 NOTE — LETTER
12/15/2021      RE: Leonela Mclean  [address] [facility] [address]       Chief Complaint:  Postoperative visit right shoulder, continued right shoulder pain.    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  She had been doing well for quite some time.  However as she is increased her activity she has had some increased shoulder pain.  Her shoulder pain is primarily anterior.  She points to her biceps groove.  Overall her shoulder feels more stable than preop.    And is working with Dr. Shaffer on a plan to treat her disordered eating.  Doris tells me this is going according to plan.    Physical Examination:  21-year-old female alert oriented no apparent distress.  Evaluation of bilateral shoulders reveals full and symmetrical range of motion.  Rotator cuff strength is 5/5 and symmetrical.    Evaluation of the right shoulder reveals no AC joint tenderness.  She is tender in the bicipital groove.  Nontender over the rotator cuff.  She has a positive Cottle's.  She has a positive speeds.  She has pain with apprehension but no true anterior apprehension.  She has a negative load-and-shift.    MRI:  Narrative & Impression   EXAM: MR right shoulder without  contrast 12/7/2021 10:37 AM     TECHNIQUE: Multiplanar, multisequence imaging of the right shoulder  were obtained without administration of intravenous or intra-articular  gadolinium contrast using routine protocol.     History: Chronic right shoulder pain; Chronic right shoulder pain      Additional Clinical information from EMR: 21-year-old female with  right shoulder pain, and a history of right shoulder arthroscopy and  posterior labral repair on 9/9/2020.     Comparison: Radiographs 12/11/2019, MR shoulder 12/5/2019     Findings:     ROTATOR CUFF and ASSOCIATED STRUCTURES  Rotator cuff: No full-thickness rotator cuff  tear or tendon  retraction.     Bursa: No subacromial or subdeltoid bursal fluid.     Musculature: Muscle bulk of rotator cuff is preserved.  Deltoid muscle  bulk is also preserved.  No muscle edema.     Acromioclavicular joint  No substantial degenerative change of the acromioclavicular joint.  Acromion is type 2 in sagittal morphology.  Coracoacromial ligament is  not thickened.     OSSEOUS STRUCTURES  No fracture. 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     LONG BICIPITAL TENDON  Tendinosis and intrasubstance tearing of the intra-articular portion  of the long biceps tendon, see coronal images 11 through 13. Tendon is  normally situated in the bicipital groove.     GLENOHUMERAL JOINT  Joint fluid: Glenohumeral joint is adequately distended with  intra-articular contrast. Small amount of extracapsular contrast seen  in anterior to the shoulder in the deltoid and pectoralis muscles. No  joint bodies.     Cartilage and subarticular bone:  No high-grade hyaline cartilage  defects are noted. No Hill-Sachs, reverse Hill-Sachs, or bony Bankart  lesions are seen.     Labrum: Postsurgical changes consistent with prior tear of the glenoid  labrum. Foci of fluid/contrast signal in the posterior labrum from  approximately the 8:00 to 10:00 position consistent with recurrent  tearing.     ANCILLARY FINDINGS:  None.                                                                      Impression:     1. Postsurgical changes of posterior glenoid labrum repair.  Fluid/contrast signal in the posterior labrum is consistent with  recurrent tearing from approximately the 8:00 to 10:00 position.     2. Tendinosis with intrasubstance tearing of the intra-articular  portion of long biceps tendon.     3. 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     4. No full-thickness rotator cuff tear or tendon retraction.           I have personally reviewed the  examination and initial interpretation  and I agree with the findings.     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  She has had a posterior labral repair approximately 15 months ago.  For the most part, her shoulder is stable.  It is certainly stable on exam.  I believe her symptoms are primarily related to irritation of her biceps tendon.  We discussed treatment options.  We discussed surgical management that would involve a biceps tenodesis.  I explained to Leonela that we do need her disordered eating to be better controlled prior to any insult of surgical management.  We also discussed a PRP injection.  I think this may help decrease her biceps inflammation.  Leoenla would like to speak to her parents about this option.    Plan:  1. Leonela will talk with her parents.  She decides to proceed with the PRP injection we will set this up with one of my primary care sports medicine colleagues  2. I would like to see her back in 3 months for routine recheck.  If the PRP is ineffective and her medical condition improves we could discuss a biceps tenodesis.          Emiliano Alejandre MD

## 2021-12-15 NOTE — LETTER
Date:December 20, 2021      Patient was self referred, no letter generated. Do not send.        [facility] Health Information

## 2021-12-18 NOTE — PROGRESS NOTES
Chief Complaint:  Postoperative visit right shoulder, continued right shoulder pain.    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  She had been doing well for quite some time.  However as she is increased her activity she has had some increased shoulder pain.  Her shoulder pain is primarily anterior.  She points to her biceps groove.  Overall her shoulder feels more stable than preop.    And is working with Dr. Shaffer on a plan to treat her disordered eating.  Doris tells me this is going according to plan.    Physical Examination:  21-year-old female alert oriented no apparent distress.  Evaluation of bilateral shoulders reveals full and symmetrical range of motion.  Rotator cuff strength is 5/5 and symmetrical.    Evaluation of the right shoulder reveals no AC joint tenderness.  She is tender in the bicipital groove.  Nontender over the rotator cuff.  She has a positive Austin's.  She has a positive speeds.  She has pain with apprehension but no true anterior apprehension.  She has a negative load-and-shift.    MRI:  Narrative & Impression   EXAM: MR right shoulder without  contrast 12/7/2021 10:37 AM     TECHNIQUE: Multiplanar, multisequence imaging of the right shoulder  were obtained without administration of intravenous or intra-articular  gadolinium contrast using routine protocol.     History: Chronic right shoulder pain; Chronic right shoulder pain      Additional Clinical information from EMR: 21-year-old female with  right shoulder pain, and a history of right shoulder arthroscopy and  posterior labral repair on 9/9/2020.     Comparison: Radiographs 12/11/2019, MR shoulder 12/5/2019     Findings:     ROTATOR CUFF and ASSOCIATED STRUCTURES  Rotator cuff: No full-thickness rotator cuff tear or tendon  retraction.     Bursa: No subacromial or subdeltoid bursal  fluid.     Musculature: Muscle bulk of rotator cuff is preserved.  Deltoid muscle  bulk is also preserved.  No muscle edema.     Acromioclavicular joint  No substantial degenerative change of the acromioclavicular joint.  Acromion is type 2 in sagittal morphology.  Coracoacromial ligament is  not thickened.     OSSEOUS STRUCTURES  No fracture. 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     LONG BICIPITAL TENDON  Tendinosis and intrasubstance tearing of the intra-articular portion  of the long biceps tendon, see coronal images 11 through 13. Tendon is  normally situated in the bicipital groove.     GLENOHUMERAL JOINT  Joint fluid: Glenohumeral joint is adequately distended with  intra-articular contrast. Small amount of extracapsular contrast seen  in anterior to the shoulder in the deltoid and pectoralis muscles. No  joint bodies.     Cartilage and subarticular bone:  No high-grade hyaline cartilage  defects are noted. No Hill-Sachs, reverse Hill-Sachs, or bony Bankart  lesions are seen.     Labrum: Postsurgical changes consistent with prior tear of the glenoid  labrum. Foci of fluid/contrast signal in the posterior labrum from  approximately the 8:00 to 10:00 position consistent with recurrent  tearing.     ANCILLARY FINDINGS:  None.                                                                      Impression:     1. Postsurgical changes of posterior glenoid labrum repair.  Fluid/contrast signal in the posterior labrum is consistent with  recurrent tearing from approximately the 8:00 to 10:00 position.     2. Tendinosis with intrasubstance tearing of the intra-articular  portion of long biceps tendon.     3. 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     4. No full-thickness rotator cuff tear or tendon retraction.           I have personally reviewed the examination and initial interpretation  and I agree with the  findings.     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  She has had a posterior labral repair approximately 15 months ago.  For the most part, her shoulder is stable.  It is certainly stable on exam.  I believe her symptoms are primarily related to irritation of her biceps tendon.  We discussed treatment options.  We discussed surgical management that would involve a biceps tenodesis.  I explained to Leonela that we do need her disordered eating to be better controlled prior to any insult of surgical management.  We also discussed a PRP injection.  I think this may help decrease her biceps inflammation.  Leonela would like to speak to her parents about this option.    Plan:  1. Leonela will talk with her parents.  She decides to proceed with the PRP injection we will set this up with one of my primary care sports medicine colleagues  2. I would like to see her back in 3 months for routine recheck.  If the PRP is ineffective and her medical condition improves we could discuss a biceps tenodesis.

## 2022-01-05 ENCOUNTER — OFFICE VISIT (OUTPATIENT)
Dept: ORTHOPEDICS | Facility: CLINIC | Age: 22
End: 2022-01-05
Payer: COMMERCIAL

## 2022-01-05 VITALS — HEIGHT: 63 IN | BODY MASS INDEX: 19.17 KG/M2 | WEIGHT: 108.2 LBS

## 2022-01-05 DIAGNOSIS — N91.1 AMENORRHEA, SECONDARY: ICD-10-CM

## 2022-01-05 DIAGNOSIS — R00.1 BRADYCARDIA: ICD-10-CM

## 2022-01-05 DIAGNOSIS — F50.9 EATING DISORDER, UNSPECIFIED TYPE: Primary | ICD-10-CM

## 2022-01-05 DIAGNOSIS — D70.9 NEUTROPENIA, UNSPECIFIED TYPE (H): ICD-10-CM

## 2022-01-05 ASSESSMENT — PATIENT HEALTH QUESTIONNAIRE - PHQ9: SUM OF ALL RESPONSES TO PHQ QUESTIONS 1-9: 1

## 2022-01-05 ASSESSMENT — MIFFLIN-ST. JEOR: SCORE: 1229.92

## 2022-01-05 NOTE — LETTER
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 Since our last visit, she thinks she has been incorporating more food and more variety into her diet. She is adding peanut butter to her oatmeal, eating more salmon, and adding more crackers for carbs. She met with a nutritionist at [facility], had one session, sent her a 3 day food log, but has yet to hear back from her.     Energy level continues to be better. Not exercising or participating in cheer. 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 Other days 7-8k. Still amenorrheic.    No dizziness or other symptoms. No purging, laxative use.     Mood good.          Medications:     Current Outpatient Medications   Medication Sig     diclofenac (VOLTAREN) 50 MG EC tablet Take 1 tablet (50 mg) by mouth 2 times daily as needed for moderate pain (Patient not taking: Reported on 11/17/2021)     diclofenac (VOLTAREN) 50 MG EC tablet TAKE 1 TABLET (50 MG) BY MOUTH 2 TIMES DAILY AS NEEDED FOR MODERATE PAIN     loratadine (CLARITIN) 10 MG tablet Take 10 mg by mouth daily (Patient not taking: Reported on 10/15/2021)     No current facility-administered medications for this visit.             Allergies:    No Known Allergies         Review of Systems:   A comprehensive 10 point review of systems (constitutional, ENT, cardiac, peripheral vascular, respiratory, GI, , Musculoskeletal, skin, Neurological) was performed and found to be negative except as described in this note.           Physical Exam:   COMPLETE EXAMINATION:   VITAL SIGNS: Ht 1.6 m (5' 3\")   Wt 49.1 kg (108 lb 3.2 oz)   BMI 19.17 kg/m     Orthostatic BP  100/57   95/59   " 97/61      Patient Position  Supine   Standing   Standing     Orthostatic Pulse  45   62   63        GEN: Alert, thin, and in no distress.   HEENT:   Head: Normocephalic and atraumatic.   Eyes: PERRLA, EOMI  Nose: no congestion or discharge  Neck: supple, no lymphadenopathy  CV: S1S2 normal, bradycardic, regular rhythm, no murmur  CHEST: CTA, Easy effort, No rales or wheezes  ABD: Soft. Nontender/nondistended, no HSM/mass, no rebound/guarding.  NEURO: Alert and oriented to person, place, and time. Gait normal.   SKIN: No rash, warmth or erythema, beta carotenemia resolving  PSYCH: Mood, memory, affect and judgment normal.           Labs:     Component      Latest Ref Rng & Units 12/7/2021 12/14/2021   WBC      4.0 - 11.0 10e3/uL 3.0 (L) 3.2 (L)   RBC Count      3.80 - 5.20 10e6/uL 4.07 3.94   Hemoglobin      11.7 - 15.7 g/dL 13.3 13.0   Hematocrit      35.0 - 47.0 % 40.3 38.8   MCV      78 - 100 fL 99 99   MCH      26.5 - 33.0 pg 32.7 33.0   MCHC      31.5 - 36.5 g/dL 33.0 33.5   RDW      10.0 - 15.0 % 11.9 12.0   Platelet Count      150 - 450 10e3/uL 246 179   % Neutrophils      % 59 71   % Lymphocytes      % 33 19   % Monocytes      % 6 8   % Eosinophils      % 1 1   % Basophils      % 1 1   % Immature Granulocytes      % 0 0   NRBCs per 100 WBC      <1 /100 0 0   Absolute Neutrophils      1.6 - 8.3 10e3/uL 1.8 2.3   Absolute Lymphocytes      0.8 - 5.3 10e3/uL 1.0 0.6 (L)   Absolute Monocytes      0.0 - 1.3 10e3/uL 0.2 0.2   Absolute Eosinophils      0.0 - 0.7 10e3/uL 0.0 0.0   Absolute Basophils      0.0 - 0.2 10e3/uL 0.0 0.0   Absolute Immature Granulocytes      <=0.4 10e3/uL 0.0 0.0   Absolute NRBCs      10e3/uL 0.0 0.0   Sodium      133 - 144 mmol/L 137    Potassium      3.4 - 5.3 mmol/L 4.1    Chloride      94 - 109 mmol/L 104    Carbon Dioxide      20 - 32 mmol/L 31    Anion Gap      3 - 14 mmol/L 2 (L)    Urea Nitrogen      7 - 30 mg/dL 18    Creatinine      0.52 - 1.04 mg/dL 0.71    Calcium      8.5 - 10.1  mg/dL 9.1    Glucose      70 - 99 mg/dL 78    GFR Estimate      >60 mL/min/1.73m2 >90    EBV Nuclear Ag Shira IgG Instrument Value      <18.0 U/mL <3.0    EBV Nuclear Antigen (EBNA) Antibody IgG      No detectable antibody. No detectable antibody.    EBV Capsid Shira IgM Instrument Value      <36.0 U/mL <10.0    EBV Capsid Antibody IgM      No detectable antibody. No detectable antibody.    EBV Capsid Shira IgG Instrument Value      <18.0 U/mL <10.0    EBV Capsid Antibody IgG      No detectable antibody. No detectable antibody.    % Retic      0.5 - 2.0 %  0.9   Absolute Retic      0.025 - 0.095 10e6/uL  0.036   Lutropin      IU/L 0.9    Free T3      2.3 - 4.2 pg/mL 1.6 (L)    ALT      0 - 50 U/L 45    AST      0 - 45 U/L 22    Vitamin B12      193 - 986 pg/mL 1,920 (H)    Hepatitis B Surface Antibody      <8.00 m[IU]/mL 0.00    Hep B Surface Agn      Nonreactive Nonreactive    Hepatitis C Antibody      Nonreactive Nonreactive    HIV Antigen Antibody Combo      Nonreactive Nonreactive    CRP Inflammation      0.0 - 8.0 mg/L <2.9    Folate      >=5.4 ng/mL  16.6   Sed Rate      0 - 20 mm/hr  7   Lactate Dehydrogenase      81 - 234 U/L  173         Assessment:     Eating disorder NOS - persistent weight loss  -Bradycardia -improving, hypotension improving  -Hypothalamic amenorrhea  -Fatigue - improving  -Neutropenia  -Low T3  -Elevated ALT  -Low LH  -Beta carotenemia        Plan:     1. Unfortunately, Leonela has failed to maintain any increase in weight. She has an intake at [place] at the end of January and will need to engage their expertise. She must attend this appointment and follow their advice as terms of her treatment plan as a [employer] athlete.  2. Reinforced that she is still not getting enough caloric intake and needs 3 meals per day plus 2 snacks with regularity, adequacy and variety. Add high caloric density foods, oils, protein shakes etc. She plans on reaching out to her offsite nutritionist, but ultimately  will need the expert care at [place].  3. She is still restricted from participation. Once she is showing a stable trend towards weight restoration and meeting treatment goals at [place], we can reconsider a progressive return to sport.  4. Will leave repeat labs for [place] in case they have any additional ones they want to obtain.    Debora Shaffer M.D.    SHI Morton was present for the entire visit.                                Debora Shaffer MD

## 2022-01-05 NOTE — PROGRESS NOTES
"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 Since our last visit, she thinks she has been incorporating more food and more variety into her diet. She is adding peanut butter to her oatmeal, eating more salmon, and adding more crackers for carbs. She met with a nutritionist at [facility], had one session, sent her a 3 day food log, but has yet to hear back from her.     Energy level continues to be better. Not exercising or participating in cheer. 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 Other days 7-8k. Still amenorrheic.    No dizziness or other symptoms. No purging, laxative use.     Mood good.          Medications:     Current Outpatient Medications   Medication Sig     diclofenac (VOLTAREN) 50 MG EC tablet Take 1 tablet (50 mg) by mouth 2 times daily as needed for moderate pain (Patient not taking: Reported on 11/17/2021)     diclofenac (VOLTAREN) 50 MG EC tablet TAKE 1 TABLET (50 MG) BY MOUTH 2 TIMES DAILY AS NEEDED FOR MODERATE PAIN     loratadine (CLARITIN) 10 MG tablet Take 10 mg by mouth daily (Patient not taking: Reported on 10/15/2021)     No current facility-administered medications for this visit.             Allergies:    No Known Allergies         Review of Systems:   A comprehensive 10 point review of systems (constitutional, ENT, cardiac, peripheral vascular, respiratory, GI, , Musculoskeletal, skin, Neurological) was performed and found to be negative except as described in this note.           Physical Exam:   COMPLETE EXAMINATION:   VITAL SIGNS: Ht 1.6 m (5' 3\")   Wt 49.1 kg (108 lb 3.2 oz)   BMI 19.17 kg/m     Orthostatic BP  100/57   95/59   97/61      Patient Position  Supine   Standing   Standing     Orthostatic Pulse  45   " 62   63        GEN: Alert, thin, and in no distress.   HEENT:   Head: Normocephalic and atraumatic.   Eyes: PERRLA, EOMI  Nose: no congestion or discharge  Neck: supple, no lymphadenopathy  CV: S1S2 normal, bradycardic, regular rhythm, no murmur  CHEST: CTA, Easy effort, No rales or wheezes  ABD: Soft. Nontender/nondistended, no HSM/mass, no rebound/guarding.  NEURO: Alert and oriented to person, place, and time. Gait normal.   SKIN: No rash, warmth or erythema, beta carotenemia resolving  PSYCH: Mood, memory, affect and judgment normal.           Labs:     Component      Latest Ref Rng & Units 12/7/2021 12/14/2021   WBC      4.0 - 11.0 10e3/uL 3.0 (L) 3.2 (L)   RBC Count      3.80 - 5.20 10e6/uL 4.07 3.94   Hemoglobin      11.7 - 15.7 g/dL 13.3 13.0   Hematocrit      35.0 - 47.0 % 40.3 38.8   MCV      78 - 100 fL 99 99   MCH      26.5 - 33.0 pg 32.7 33.0   MCHC      31.5 - 36.5 g/dL 33.0 33.5   RDW      10.0 - 15.0 % 11.9 12.0   Platelet Count      150 - 450 10e3/uL 246 179   % Neutrophils      % 59 71   % Lymphocytes      % 33 19   % Monocytes      % 6 8   % Eosinophils      % 1 1   % Basophils      % 1 1   % Immature Granulocytes      % 0 0   NRBCs per 100 WBC      <1 /100 0 0   Absolute Neutrophils      1.6 - 8.3 10e3/uL 1.8 2.3   Absolute Lymphocytes      0.8 - 5.3 10e3/uL 1.0 0.6 (L)   Absolute Monocytes      0.0 - 1.3 10e3/uL 0.2 0.2   Absolute Eosinophils      0.0 - 0.7 10e3/uL 0.0 0.0   Absolute Basophils      0.0 - 0.2 10e3/uL 0.0 0.0   Absolute Immature Granulocytes      <=0.4 10e3/uL 0.0 0.0   Absolute NRBCs      10e3/uL 0.0 0.0   Sodium      133 - 144 mmol/L 137    Potassium      3.4 - 5.3 mmol/L 4.1    Chloride      94 - 109 mmol/L 104    Carbon Dioxide      20 - 32 mmol/L 31    Anion Gap      3 - 14 mmol/L 2 (L)    Urea Nitrogen      7 - 30 mg/dL 18    Creatinine      0.52 - 1.04 mg/dL 0.71    Calcium      8.5 - 10.1 mg/dL 9.1    Glucose      70 - 99 mg/dL 78    GFR Estimate      >60 mL/min/1.73m2 >90     EBV Nuclear Ag Shira IgG Instrument Value      <18.0 U/mL <3.0    EBV Nuclear Antigen (EBNA) Antibody IgG      No detectable antibody. No detectable antibody.    EBV Capsid Shira IgM Instrument Value      <36.0 U/mL <10.0    EBV Capsid Antibody IgM      No detectable antibody. No detectable antibody.    EBV Capsid Shira IgG Instrument Value      <18.0 U/mL <10.0    EBV Capsid Antibody IgG      No detectable antibody. No detectable antibody.    % Retic      0.5 - 2.0 %  0.9   Absolute Retic      0.025 - 0.095 10e6/uL  0.036   Lutropin      IU/L 0.9    Free T3      2.3 - 4.2 pg/mL 1.6 (L)    ALT      0 - 50 U/L 45    AST      0 - 45 U/L 22    Vitamin B12      193 - 986 pg/mL 1,920 (H)    Hepatitis B Surface Antibody      <8.00 m[IU]/mL 0.00    Hep B Surface Agn      Nonreactive Nonreactive    Hepatitis C Antibody      Nonreactive Nonreactive    HIV Antigen Antibody Combo      Nonreactive Nonreactive    CRP Inflammation      0.0 - 8.0 mg/L <2.9    Folate      >=5.4 ng/mL  16.6   Sed Rate      0 - 20 mm/hr  7   Lactate Dehydrogenase      81 - 234 U/L  173         Assessment:     Eating disorder NOS - persistent weight loss  -Bradycardia -improving, hypotension improving  -Hypothalamic amenorrhea  -Fatigue - improving  -Neutropenia  -Low T3  -Elevated ALT  -Low LH  -Beta carotenemia        Plan:     1. Unfortunately, Leonela has failed to maintain any increase in weight. She has an intake at [place] at the end of January and will need to engage their expertise. She must attend this appointment and follow their advice as terms of her treatment plan as a [employer] athlete.  2. Reinforced that she is still not getting enough caloric intake and needs 3 meals per day plus 2 snacks with regularity, adequacy and variety. Add high caloric density foods, oils, protein shakes etc. She plans on reaching out to her offsite nutritionist, but ultimately will need the expert care at [place].  3. She is still restricted from participation.  Once she is showing a stable trend towards weight restoration and meeting treatment goals at [place], we can reconsider a progressive return to sport.  4. Will leave repeat labs for [place] in case they have any additional ones they want to obtain.    Debora Shaffer M.D.    ATC Paula Morton was present for the entire visit.

## 2022-01-05 NOTE — LETTER
Date:January 7, 2022      Patient was self referred, no letter generated. Do not send.        [facility] Health Information

## 2022-02-16 ENCOUNTER — HOSPITAL ENCOUNTER (OUTPATIENT)
Dept: RESEARCH | Facility: CLINIC | Age: 22
End: 2022-02-16
Attending: INTERNAL MEDICINE
Payer: COMMERCIAL

## 2022-02-16 PROCEDURE — 510N000009 HC RESEARCH FACILITY, PER 15 MIN

## 2022-03-11 ENCOUNTER — MYC MEDICAL ADVICE (OUTPATIENT)
Dept: FAMILY MEDICINE | Facility: CLINIC | Age: 22
End: 2022-03-11
Payer: COMMERCIAL

## 2022-03-11 DIAGNOSIS — R53.83 FATIGUE, UNSPECIFIED TYPE: Primary | ICD-10-CM

## 2022-03-15 ENCOUNTER — OFFICE VISIT (OUTPATIENT)
Dept: ORTHOPEDICS | Facility: CLINIC | Age: 22
End: 2022-03-15
Payer: COMMERCIAL

## 2022-03-15 VITALS
BODY MASS INDEX: 19.67 KG/M2 | SYSTOLIC BLOOD PRESSURE: 94 MMHG | WEIGHT: 111 LBS | HEIGHT: 63 IN | DIASTOLIC BLOOD PRESSURE: 57 MMHG | HEART RATE: 75 BPM

## 2022-03-15 DIAGNOSIS — Z98.890 S/P SHOULDER SURGERY: Primary | ICD-10-CM

## 2022-03-15 NOTE — LETTER
Date:March 16, 2022      Patient was self referred, no letter generated. Do not send.        [facility] Health Information

## 2022-03-15 NOTE — LETTER
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  Her shoulder feels stable.  However she has persistent anterior shoulder pain.  Her symptoms are very consistent with biceps tendinitis.  We have tried rehabilitation.  We have tried injections.    Leonela continues to see our primary care sports physicians for disordered eating.  There is concern that she is not medically healthy enough for surgery.    Physical Examination:  21-year-old female alert oriented no apparent distress.  Evaluation of bilateral shoulders reveals full and symmetrical range of motion her rotator cuff strength is 5/5 her right shoulder has no AC joint tenderness.  She is markedly tender in the bicipital groove.  She has a positive Morrill's.  Slightly positive speeds.  She has a negative apprehension.  Negative posterior apprehension.    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  And then I had a long conversation about her shoulder today.  I explained the biceps irritation does not \"ruining\" her shoulder.  Time is on her side and if she wants to see how things go over the next few months it is perfectly reasonable.  I explained that the surgical treatment would be a biceps tenodesis.  We discussed that tonight.  And had her questions answered.    It is important that Leonela work on her disordered eating treatment.  We would need to have medical clearance if we were to proceed with surgical treatment.    Plan:  1. Continue activities as tolerated  2. Think about surgical options  3. Follow-up with me in 6 months for routine recheck or sooner if " needed          Emiliano Alejandre MD

## 2022-03-16 NOTE — PROGRESS NOTES
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  Her shoulder feels stable.  However she has persistent anterior shoulder pain.  Her symptoms are very consistent with biceps tendinitis.  We have tried rehabilitation.  We have tried injections.    Leonela continues to see our primary care sports physicians for disordered eating.  There is concern that she is not medically healthy enough for surgery.    Physical Examination:  21-year-old female alert oriented no apparent distress.  Evaluation of bilateral shoulders reveals full and symmetrical range of motion her rotator cuff strength is 5/5 her right shoulder has no AC joint tenderness.  She is markedly tender in the bicipital groove.  She has a positive Petroleum's.  Slightly positive speeds.  She has a negative apprehension.  Negative posterior apprehension.    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  And then I had a long conversation about her shoulder today.  I explained the biceps irritation does not \"ruining\" her shoulder.  Time is on her side and if she wants to see how things go over the next few months it is perfectly reasonable.  I explained that the surgical treatment would be a biceps tenodesis.  We discussed that tonight.  And had her questions answered.    It is important that Leonela work on her disordered eating treatment.  We would need to have medical clearance if we were to proceed with surgical treatment.    Plan:  1. Continue activities as tolerated  2. Think about surgical options  3. Follow-up with me in 6 months for routine recheck or sooner if needed      "

## 2022-05-30 NOTE — PROGRESS NOTES
[facility] ATHLETICS  Susu ATC follow-up note  Date of service performed: 3/23/2020    Concern/injury: Right shoulder    Spoke with Leonela via text to let her know her surgery will be delayed due to COVID-19 affecting elective surgeries at this time. Unknown timeline for surgery to be re-scheduled.    She reports her shoulder has been hurting more lately. Continuing to do rehab exercises on her own at home.    She did ask if surgery is delayed for an extended period of time if she could wait until after next cheer season to have surgery. Explained this is all based on symptoms. If she feels her shoulder could hold up another season, then she could postpone surgery until after season.       Beba Oglesby, ATC       Problem: PAIN - ADULT  Goal: Verbalizes/displays adequate comfort level or baseline comfort level  Description: Interventions:  - Encourage patient to monitor pain and request assistance  - Assess pain using appropriate pain scale  - Administer analgesics based on type and severity of pain and evaluate response  - Implement non-pharmacological measures as appropriate and evaluate response  - Consider cultural and social influences on pain and pain management  - Notify physician/advanced practitioner if interventions unsuccessful or patient reports new pain  Outcome: Progressing     Problem: INFECTION - ADULT  Goal: Absence or prevention of progression during hospitalization  Description: INTERVENTIONS:  - Assess and monitor for signs and symptoms of infection  - Monitor lab/diagnostic results  - Monitor all insertion sites, i e  indwelling lines, tubes, and drains  - Monitor endotracheal if appropriate and nasal secretions for changes in amount and color  - [place] appropriate cooling/warming therapies per order  - Administer medications as ordered  - Instruct and encourage patient and family to use good hand hygiene technique  - Identify and instruct in appropriate isolation precautions for identified infection/condition  Outcome: Progressing  Goal: Absence of fever/infection during neutropenic period  Description: INTERVENTIONS:  - Monitor WBC    Outcome: Progressing     Problem: SAFETY ADULT  Goal: Patient will remain free of falls  Description: INTERVENTIONS:  - Educate patient/family on patient safety including physical limitations  - Instruct patient to call for assistance with activity   - Consult OT/PT to assist with strengthening/mobility   - Keep Call bell within reach  - Keep bed low and locked with side rails adjusted as appropriate  - Keep care items and personal belongings within reach  - Initiate and maintain comfort roundsOutcome: Progressing  Goal: Maintain or return to baseline ADL function  Description: INTERVENTIONS:  -  Assess patient's ability to carry out ADLs; assess patient's baseline for ADL function and identify physical deficits which impact ability to perform ADLs (bathing, care of mouth/teeth, toileting, grooming, dressing, etc )  - Assess/evaluate cause of self-care deficits   - Assess range of motion  - Assess patient's mobility; develop plan if impaired  - Assess patient's need for assistive devices and provide as appropriate  - Encourage maximum independence but intervene and supervise when necessary  - Involve family in performance of ADLs  - Assess for home care needs following discharge   - Consider OT consult to assist with ADL evaluation and planning for discharge  - Provide patient education as appropriate  Outcome: Progressing  Problem: Knowledge Deficit  Goal: Patient/family/caregiver demonstrates understanding of disease process, treatment plan, medications, and discharge instructions  Description: Complete learning assessment and assess knowledge base    Interventions:  - Provide teaching at level of understanding  - Provide teaching via preferred learning methods  Outcome: Progressing

## 2022-09-18 ENCOUNTER — HEALTH MAINTENANCE LETTER (OUTPATIENT)
Age: 22
End: 2022-09-18

## 2022-09-27 ENCOUNTER — OFFICE VISIT (OUTPATIENT)
Dept: ORTHOPEDICS | Facility: CLINIC | Age: 22
End: 2022-09-27
Payer: COMMERCIAL

## 2022-09-27 VITALS — HEIGHT: 63 IN | WEIGHT: 111 LBS | BODY MASS INDEX: 19.67 KG/M2

## 2022-09-27 DIAGNOSIS — G89.29 CHRONIC RIGHT SHOULDER PAIN: Primary | ICD-10-CM

## 2022-09-27 DIAGNOSIS — M25.511 CHRONIC RIGHT SHOULDER PAIN: Primary | ICD-10-CM

## 2022-09-27 PROCEDURE — 99214 OFFICE O/P EST MOD 30 MIN: CPT | Performed by: ORTHOPAEDIC SURGERY

## 2022-09-27 NOTE — LETTER
"    9/27/2022         RE: Leonela Mclean  [id number] [facility] [address]        Dear Colleague,    Thank you for referring your patient, Leonela Mclean, to the [facility] [place]. Please see a copy of my visit note below.    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  She underwent a posterior labral repair September 2020.  This was quite helpful.  However she had a reinjury.  She now feels that her shoulder slips out the bottom and slips out front.  She also has anterior shoulder pain.  Her instability is a bit more of a problem than her pain currently.  Overall she is fairly functional.  However she is a bit frustrated with her shoulder.    Physical Examination:  22-year-old female alert oriented no apparent distress.  She has full and symmetrical range of motion of the shoulder.  Rotator cuff strength is 5/5.  She has no AC joint tenderness.  She has slight biceps groove tenderness.  Negative crossarm.  Positive [place]'s.  Positive apprehension.  Positive relocation.  Negative posterior apprehension.  3+ anterior load-and-shift.  Negative posterior load-and-shift.    Imaging:  I reviewed her MRI from December 2021.  She does have some irregularity of her posterior labrum.  On this study she does not have anterior labral pathology.  She has some mild biceps tendinosis.    Impression:  22-year-old female almost 2 years status post right shoulder posterior labral repair.  Her primary complaint now is anterior instability.  However she also has some component of biceps tendon symptoms.  She has failed nonoperative treatment.  Her symptoms are more of an annoyance.  Leonela and I discussed the fact that she is not \"ruining\" her shoulder by continuing activity as tolerated.  Certainly the decision to proceed with any type of " surgical intervention would be based on functional impairment due to order instability and pain.  I think if we were to proceed with surgery I would recommend a diagnostic arthroscopy.  We would most likely perform an anterior capsular plication and probably at a Slow labral plasty.  We would evaluate the biceps tendon.  If the biceps tendon appears inflamed or abnormal we would perform a subpectoral biceps tenodesis.  We did discuss the surgery at length.  I explained the risks including bleeding infection nerve damage complications from anesthesia blood clot etc.  I also explained the more pertinent risks with this type of surgery including recurrent injury or recurrent instability.  She may end up with shoulder stiffness or scar tissue that could be problematic.  There can be implant complications.  There is a possibility that if we do tenodesis of biceps she has a cosmetic deformity.  She may develop early degenerative changes in his shoulder.  And I had a chance to have her questions answered.    Plan:  1. At this point Leonela would like to think about surgery and the timing of surgery.  2. If she decides to proceed she will contact the office and we will schedule her for right shoulder arthroscopy, arthroscopic stabilization, possible open subpectoral biceps tenodesis.  3. This will be done in the lateral decubitus position.  She would need a history and physical.        Again, thank you for allowing me to participate in the care of your patient.        Sincerely,        Emiliano Alejandre MD

## 2022-09-27 NOTE — LETTER
Date:October 3, 2022      Provider requested that no letter be sent. Do not send.       [facility]

## 2022-09-27 NOTE — NURSING NOTE
Teaching Flowsheet   Relevant Diagnosis: Right shoulder arthroscopy and stabilization  Teaching Topic: pre-operative instructions     Person(s) involved in teaching:   Patient     Motivation Level:  Asks Questions: Yes  Eager to Learn: Yes  Cooperative: Yes  Receptive (willing/able to accept information): Yes  Any cultural factors/Yazidi beliefs that may influence understanding or compliance? No     Patient demonstrates understanding of the following:  Reason for the appointment, diagnosis and treatment plan: Yes  Knowledge of proper use of medications and conditions for which they are ordered (with special attention to potential side effects or drug interactions): Yes  Which situations necessitate calling provider and whom to contact: Yes     Teaching Concerns Addressed: pre-operative instructions     Proper use and care of shoulder immobilizer (medical equip, care aids, etc.): No, explain: will be provided the day of surgery.   Nutritional needs and diet plan: Yes  Pain management techniques: Yes  Wound Care: Yes  How and/when to access community resources: Yes     Instructional Materials Used/Given: surgical soap and pre-operative instructions     Time spent with patient: 15 minutes.      ** Patient understands that she will be scheduled for surgery at [facility] with Dr. Emiliano Alejandre. Patient will reach out to clinic when she is ready to move forward with scheduling surgery around January 2023. Patient understand she will need to complete a pre-op H & P within 30 days of her surgery date, she will complete this within the [facility] system. Patient understands she will need to take an at home COVID 19 antigen test 1-2 days prior to surgery. She does understand that COVID protocols could change prior to her surgery.     a. Does the patient take five or more prescription medications? No  b. Does patient have difficulty walking up two flights of stairs? No  c. Is patient s BMI 35 or greater? No  d. Is patient  an insulin dependent diabetic? No  e. Does patient have a history of having a heart attack or a heart surgery of any kind? No  f. Does patient have a history of heart failure? No  g. Does the patient have a history of any type of transplant? No  h. Does the patient use inhalers on a daily basis? No  i. Does the patient have a history of pulmonary hypertension? No  Once the patient is evaluated by PAC contact (ex: Surgery schedulers name and number, RN's name and number, etc..);  Madelyn [phone]  Name of planned surgery______________________________

## 2022-10-02 NOTE — PROGRESS NOTES
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  She underwent a posterior labral repair September 2020.  This was quite helpful.  However she had a reinjury.  She now feels that her shoulder slips out the bottom and slips out front.  She also has anterior shoulder pain.  Her instability is a bit more of a problem than her pain currently.  Overall she is fairly functional.  However she is a bit frustrated with her shoulder.    Physical Examination:  22-year-old female alert oriented no apparent distress.  She has full and symmetrical range of motion of the shoulder.  Rotator cuff strength is 5/5.  She has no AC joint tenderness.  She has slight biceps groove tenderness.  Negative crossarm.  Positive [place]'s.  Positive apprehension.  Positive relocation.  Negative posterior apprehension.  3+ anterior load-and-shift.  Negative posterior load-and-shift.    Imaging:  I reviewed her MRI from December 2021.  She does have some irregularity of her posterior labrum.  On this study she does not have anterior labral pathology.  She has some mild biceps tendinosis.    Impression:  22-year-old female almost 2 years status post right shoulder posterior labral repair.  Her primary complaint now is anterior instability.  However she also has some component of biceps tendon symptoms.  She has failed nonoperative treatment.  Her symptoms are more of an annoyance.  Leonela and I discussed the fact that she is not \"ruining\" her shoulder by continuing activity as tolerated.  Certainly the decision to proceed with any type of surgical intervention would be based on functional impairment due to order instability and pain.  I think if we were to proceed with surgery I would recommend a diagnostic arthroscopy.  We would most likely perform an anterior capsular plication and probably at a Slow " labral plasty.  We would evaluate the biceps tendon.  If the biceps tendon appears inflamed or abnormal we would perform a subpectoral biceps tenodesis.  We did discuss the surgery at length.  I explained the risks including bleeding infection nerve damage complications from anesthesia blood clot etc.  I also explained the more pertinent risks with this type of surgery including recurrent injury or recurrent instability.  She may end up with shoulder stiffness or scar tissue that could be problematic.  There can be implant complications.  There is a possibility that if we do tenodesis of biceps she has a cosmetic deformity.  She may develop early degenerative changes in his shoulder.  And I had a chance to have her questions answered.    Plan:  1. At this point Leonela would like to think about surgery and the timing of surgery.  2. If she decides to proceed she will contact the office and we will schedule her for right shoulder arthroscopy, arthroscopic stabilization, possible open subpectoral biceps tenodesis.  3. This will be done in the lateral decubitus position.  She would need a history and physical.     ambulatory

## 2022-10-04 ENCOUNTER — TELEPHONE (OUTPATIENT)
Dept: ORTHOPEDICS | Facility: CLINIC | Age: 22
End: 2022-10-04

## 2022-10-04 NOTE — TELEPHONE ENCOUNTER
Received call from pt looking to schedule surgery. Writer informed pt there are no orders received just yet, but writer would reach out to have this sent over and someone would give her a call back once the order is received. Pt understood and agreed.    Jessica Lyon on 10/4/2022 at 11:48 AM

## 2022-10-05 ENCOUNTER — TELEPHONE (OUTPATIENT)
Dept: ORTHOPEDICS | Facility: CLINIC | Age: 22
End: 2022-10-05

## 2022-10-05 DIAGNOSIS — G89.29 CHRONIC RIGHT SHOULDER PAIN: ICD-10-CM

## 2022-10-05 DIAGNOSIS — M25.511 CHRONIC RIGHT SHOULDER PAIN: ICD-10-CM

## 2022-10-05 DIAGNOSIS — Z98.890 S/P SHOULDER SURGERY: Primary | ICD-10-CM

## 2022-10-05 DIAGNOSIS — S43.431D BANKART LESION OF RIGHT SHOULDER, SUBSEQUENT ENCOUNTER: ICD-10-CM

## 2022-10-05 NOTE — TELEPHONE ENCOUNTER
Called pt to schedule surgery. No answer, LVM to call writer  Back at [phone].    Jessica Lyon on 10/5/2022 at 9:21 AM

## 2022-10-05 NOTE — TELEPHONE ENCOUNTER
Called patient to schedule surgery with Dr. Alejandre    Date of Surgery: 01/04/23    Location of surgery: [facility]    Pre-Op H&P: PAC per pt req    Pre/Post Imaging:  No    Discussed COVID-19 Testing: Yes    Post-Op Appt Date: none mention in case req    Surgery Packet Mailed: mailed to pt address on file.      Additional comments: Received call back from pt to schedule surgery. Offered pt 12/28 and pt declined and req 01/04/2023. Confirmed w/pt of pre op with PCP and at home Covid testing. Pt req to be seen here for her pre op instead of her PCP, but stated she needed to look at her schedule first and would give us a call back.    Jessica Lyon on 10/5/2022 at 10:00 AM

## 2022-10-05 NOTE — TELEPHONE ENCOUNTER
Received call back from pt to schedule PAC appointment. Pt request 12/5, and writer agreed.     Jessica Lyon on 10/5/2022 at 11:24 AM

## 2022-10-06 NOTE — TELEPHONE ENCOUNTER
FUTURE VISIT INFORMATION      SURGERY INFORMATION:    Date: 22    [facility]: [facility]    Surgeon:  Emiliano Alejandre MD    Anesthesia Type:  Choice with block    Procedure: RIGHT ARTHROSCOPY SHOULDER, WITH ARTHROSCOPIC BANKART REPAIR AND  CAPSULAR PLICATION. POSSIBLE OPEN SUBPECTORAL BICEPS TENODESIS    Consult: ov     RECORDS REQUESTED FROM:       Primary Care Provider: [facility]    Most recent EKG+ Tracin/10/21

## 2022-11-23 ENCOUNTER — DOCUMENTATION ONLY (OUTPATIENT)
Dept: ORTHOPEDICS | Facility: CLINIC | Age: 22
End: 2022-11-23

## 2022-11-23 NOTE — PROGRESS NOTES
Type of Form Received: fmla    Form Received (Date) 10/26/22   Form Filled out Yes, date 11/23/22   Placed in provider folder Yes

## 2022-12-02 NOTE — PROGRESS NOTES
Received Completed forms Yes   Faxed Forms Faxed To: [place]  Fax Number: [phone]   Sent to [facility] (Date) 11/30/22

## 2022-12-05 ENCOUNTER — ANESTHESIA EVENT (OUTPATIENT)
Dept: SURGERY | Facility: CLINIC | Age: 22
End: 2022-12-05

## 2022-12-05 ENCOUNTER — PRE VISIT (OUTPATIENT)
Dept: SURGERY | Facility: CLINIC | Age: 22
End: 2022-12-05

## 2022-12-05 ENCOUNTER — OFFICE VISIT (OUTPATIENT)
Dept: SURGERY | Facility: CLINIC | Age: 22
End: 2022-12-05
Payer: COMMERCIAL

## 2022-12-05 VITALS
WEIGHT: 119.5 LBS | TEMPERATURE: 98.3 F | HEIGHT: 63 IN | DIASTOLIC BLOOD PRESSURE: 65 MMHG | RESPIRATION RATE: 16 BRPM | HEART RATE: 53 BPM | OXYGEN SATURATION: 98 % | BODY MASS INDEX: 21.17 KG/M2 | SYSTOLIC BLOOD PRESSURE: 108 MMHG

## 2022-12-05 DIAGNOSIS — Z01.818 PRE-OP EXAMINATION: Primary | ICD-10-CM

## 2022-12-05 DIAGNOSIS — G89.29 CHRONIC RIGHT SHOULDER PAIN: ICD-10-CM

## 2022-12-05 DIAGNOSIS — M25.511 CHRONIC RIGHT SHOULDER PAIN: ICD-10-CM

## 2022-12-05 PROCEDURE — 99203 OFFICE O/P NEW LOW 30 MIN: CPT | Performed by: PHYSICIAN ASSISTANT

## 2022-12-05 ASSESSMENT — PAIN SCALES - GENERAL: PAINLEVEL: NO PAIN (0)

## 2022-12-05 NOTE — H&P
Pre-Operative H & P     CC:  Preoperative exam to assess for increased cardiopulmonary risk while undergoing surgery and anesthesia.    Date of Encounter: 12/5/2022  Primary Care Physician:  [facility]     Reason for visit:   Encounter Diagnoses   Name Primary?     Pre-op examination Yes     Chronic right shoulder pain        HPI  Leonela Mclean is a 22 year old female who presents for pre-operative H & P in preparation for  Procedure Information     Date/Time: 1/4/23     Procedure: RIGHT ARTHROSCOPY SHOULDER, WITH ARTHROSCOPIC BANKART REPAIR AND  CAPSULAR PLICATION. POSSIBLE OPEN SUBPECTORAL BICEPS TENODESIS    Anesthesia type: Choice with block     Pre-op diagnosis: Chronic right shoulder pain     Location: [facility]    Providers: Dr. Alejandre          The patient is a 22 year old woman who has a past medical history significant for seasonal allergies, amenorrhea and chronic right shoulder pain. The patient underwent prior right shoulder labral repair in 9/2020 but has had return of pain after re-injuring herself. She met with Dr. Alejandre on 9/27/22 and has been scheduled for the procedure as above.     History is obtained from the patient and chart review    Hx of abnormal bleeding or anti-platelet use: none    Menstrual history: No LMP recorded (lmp unknown). (Menstrual status: Amenorrhea).:      Past Medical History  Past Medical History:   Diagnosis Date     Amenorrhea      Chronic right shoulder pain      Seasonal allergic rhinitis        Past Surgical History  Past Surgical History:   Procedure Laterality Date     ARTHROSCOPY SHOULDER SUPERIOR LABRUM ANTERIOR TO POSTERIOR REPAIR Right 09/09/2020    Procedure: right shoulder arthroscopy, posterior labral repair;  Surgeon: Emiliano Alejandre MD;  Location:  OR     C UNLISTED PROCEDURE, ABDOMEN/PERITONEUM/OMENTUM      Description: Hernia Repair;  Recorded: 12/19/2012;  Comments: 2006     INGUINAL HERNIA  REPAIR  01/01/2007     wisdom teeth extraction         Prior to Admission Medications  Current Outpatient Medications   Medication Sig Dispense Refill     loratadine (CLARITIN) 10 MG tablet Take 10 mg by mouth as needed       diclofenac (VOLTAREN) 50 MG EC tablet Take 1 tablet (50 mg) by mouth 2 times daily as needed for moderate pain (Patient not taking: Reported on 11/17/2021) 42 tablet 0     diclofenac (VOLTAREN) 50 MG EC tablet TAKE 1 TABLET (50 MG) BY MOUTH 2 TIMES DAILY AS NEEDED FOR MODERATE PAIN (Patient not taking: Reported on 12/5/2022) 42 tablet 0       Allergies  No Known Allergies    Social History  Social History     Socioeconomic History     Marital status: Single     Spouse name: Not on file     Number of children: Not on file     Years of education: Not on file     Highest education level: Not on file   Occupational History     Not on file   Tobacco Use     Smoking status: Never     Smokeless tobacco: Never   Substance and Sexual Activity     Alcohol use: Yes     Comment: occasional     Drug use: Never     Sexual activity: Not on file   Other Topics Concern     Not on file   Social History Narrative     Not on file     Social Determinants of Health     Financial Resource Strain: Not on file   Food Insecurity: Not on file   Transportation Needs: Not on file   Physical Activity: Not on file   Stress: Not on file   Social Connections: Not on file   Intimate Partner Violence: Not on file   Housing Stability: Not on file       Family History  Family History   Problem Relation Age of Onset     Hyperlipidemia Mother      Hypertension Maternal Grandmother      Cancer Paternal Grandmother      Cancer Paternal Grandfather         Skin     Hypertension Other      Anesthesia Reaction No family hx of      Venous thrombosis No family hx of        Review of Systems  The complete review of systems is negative other than noted in the HPI or here.   Anesthesia Evaluation   Pt has had prior anesthetic. Type: General  "and Regional.    No history of anesthetic complications       ROS/MED HX  ENT/Pulmonary:     (+) allergic rhinitis,     Neurologic:  - neg neurologic ROS     Cardiovascular:       METS/Exercise Tolerance: >4 METS    Hematologic:  - neg hematologic  ROS     Musculoskeletal: Comment: S/p Right RIGHT ARTHROSCOPY SHOULDER, WITH ARTHROSCOPIC BANKART REPAIR AND  CAPSULAR PLICATION. POSSIBLE OPEN SUBPECTORAL BICEPS TENODESIS 9/2020    Ongoing shoulder pain       GI/Hepatic:  - neg GI/hepatic ROS  (-) GERD   Renal/Genitourinary: Comment: amenorrhea       Endo:  - neg endo ROS     Psychiatric/Substance Use:  - neg psychiatric ROS     Infectious Disease:  - neg infectious disease ROS     Malignancy:  - neg malignancy ROS     Other:  - neg other ROS          /65 (BP Location: Right arm, Patient Position: Sitting, Cuff Size: Adult Regular)   Pulse 53   Temp 98.3  F (36.8  C) (Oral)   Resp 16   Ht 1.6 m (5' 3\")   Wt 54.2 kg (119 lb 8 oz)   LMP  (LMP Unknown)   SpO2 98%   Breastfeeding No   BMI 21.17 kg/m      Physical Exam   Constitutional: Awake, alert, cooperative, no apparent distress, and appears stated age.  Eyes: Pupils equal, round and reactive to light, extra ocular muscles intact, sclera clear, conjunctiva normal.  HENT: Normocephalic, oral pharynx with moist mucus membranes, good dentition. No goiter appreciated.   Respiratory: Clear to auscultation bilaterally, no crackles or wheezing.  Cardiovascular: Regular rate and rhythm, normal S1 and S2, and no murmur noted.  Carotids +2, no bruits. No edema. Palpable pulses to radial  DP and PT arteries.   GI: Normal bowel sounds, soft, non-distended, non-tender, no masses palpated, no hepatosplenomegaly.    Lymph/Hematologic: No cervical lymphadenopathy and no supraclavicular lymphadenopathy.  Genitourinary:  defer  Skin: Warm and dry.  No rashes at anticipated surgical site.   Musculoskeletal: Full ROM of neck. There is no redness, warmth, or swelling of the " joints. Gross motor strength is normal.    Neurologic: Awake, alert, oriented to name, place and time. Cranial nerves II-XII are grossly intact. Gait is normal.   Neuropsychiatric: Calm, cooperative. Normal affect.     Prior Labs/Diagnostic Studies   All labs and imaging personally reviewed    Latest Reference Range & Units 12/07/21 08:05   Sodium 133 - 144 mmol/L 137   Potassium 3.4 - 5.3 mmol/L 4.1   Chloride 94 - 109 mmol/L 104   Carbon Dioxide 20 - 32 mmol/L 31   Urea Nitrogen 7 - 30 mg/dL 18   Creatinine 0.52 - 1.04 mg/dL 0.71   GFR Estimate >60 mL/min/1.73m2 >90   Calcium 8.5 - 10.1 mg/dL 9.1   Anion Gap 3 - 14 mmol/L 2 (L)   ALT 0 - 50 U/L 45   AST 0 - 45 U/L 22   CRP Inflammation 0.0 - 8.0 mg/L <2.9   Free T3 2.3 - 4.2 pg/mL 1.6 (L)   Vitamin B12 193 - 986 pg/mL 1,920 (H)   Glucose 70 - 99 mg/dL 78      Latest Reference Range & Units 12/14/21 14:04   WBC 4.0 - 11.0 10e3/uL 3.2 (L)   Hemoglobin 11.7 - 15.7 g/dL 13.0   Hematocrit 35.0 - 47.0 % 38.8   Platelet Count 150 - 450 10e3/uL 179   RBC Count 3.80 - 5.20 10e6/uL 3.94   MCV 78 - 100 fL 99   MCH 26.5 - 33.0 pg 33.0   MCHC 31.5 - 36.5 g/dL 33.5   RDW 10.0 - 15.0 % 12.0   % Neutrophils % 71   % Lymphocytes % 19   % Monocytes % 8   % Eosinophils % 1   % Basophils % 1   Absolute Basophils 0.0 - 0.2 10e3/uL 0.0   Absolute Eosinophils 0.0 - 0.7 10e3/uL 0.0   Absolute Immature Granulocytes <=0.4 10e3/uL 0.0   Absolute Lymphocytes 0.8 - 5.3 10e3/uL 0.6 (L)   Absolute Monocytes 0.0 - 1.3 10e3/uL 0.2   % Immature Granulocytes % 0   Absolute Neutrophils 1.6 - 8.3 10e3/uL 2.3   Absolute NRBCs 10e3/uL 0.0   NRBCs per 100 WBC <1 /100 0   % Retic 0.5 - 2.0 % 0.9   Absolute Retic 0.025 - 0.095 10e6/uL 0.036   Sed Rate 0 - 20 mm/hr 7       EKG/ stress test - if available please see in ROS above   No results found.  No flowsheet data found.      The patient's records and results personally reviewed by this provider.     Outside records reviewed from: Care  "Everywhere    Assessment      Leonela Mclean is a 22 year old female seen as a PAC referral for risk assessment and optimization for anesthesia.    Plan/Recommendations  Pt will be optimized for the proposed procedure.  See below for details on the assessment, risk, and preoperative recommendations    NEUROLOGY  - No history of TIA, CVA or seizure  -Post Op delirium risk factors:  No risk identified    ENT  - No current airway concerns.  Will need to be reassessed day of surgery.  Mallampati: I  TM: > 3    CARDIAC  - No history of CAD, Hypertension and Afib  - METS (Metabolic Equivalents)  Patient performs 4 or more METS exercise without symptoms            Total Score: 0      RCRI-Very low risk: Class 1 0.4% complication rate            Total Score: 0        PULMONARY  - Obstructive Sleep Apnea  No current risk of obstructive sleep apnea   CHADD Low Risk            Total Score: 0      - Denies asthma or inhaler use  - Tobacco History      History   Smoking Status     Never   Smokeless Tobacco     Never       GI  PONV High Risk  Total Score: 3           1 AN PONV: Pt is Female    1 AN PONV: Patient is not a current smoker    1 AN PONV: Intended Post Op Opioids        /RENAL  - Baseline Creatinine  0.71  ~ Amenorrhea     ENDOCRINE    - BMI: Estimated body mass index is 21.17 kg/m  as calculated from the following:    Height as of this encounter: 1.6 m (5' 3\").    Weight as of this encounter: 54.2 kg (119 lb 8 oz).  Healthy Weight (BMI 18.5-24.9)  - No history of Diabetes Mellitus    HEME  VTE Low Risk 0.26%            Total Score: 0      - No history of abnormal bleeding or antiplatelet use.    MSK  ~ Chronic right shoulder pain - procedure as above.     PSYCH  - Last year the patient had been meeting with Dr. Shaffer for an eating disorder. She reports she met with a nutritionist and has made improvements. Her weight today and BMI are within normal limits.     Different anesthesia methods/types have been discussed " with the patient, but they are aware that the final plan will be decided by the assigned anesthesia provider on the date of service.    The patient is optimized for their procedure. AVS with information on surgery time/arrival time, meds and NPO status given by nursing staff. No further diagnostic testing indicated.      On the day of service:     Prep time: 10 minutes  Visit time: 11 minutes  Documentation time: 7 minutes  ------------------------------------------  Total time: 28 minutes      Francisca Richards PA-C  Preoperative Assessment Center  [facility]  Phone: [phone]  Fax: [phone]

## 2022-12-05 NOTE — H&P (VIEW-ONLY)
Pre-Operative H & P     CC:  Preoperative exam to assess for increased cardiopulmonary risk while undergoing surgery and anesthesia.    Date of Encounter: 12/5/2022  Primary Care Physician:  [employer]     Reason for visit:   Encounter Diagnoses   Name Primary?     Pre-op examination Yes     Chronic right shoulder pain        HPI  Leonela Mclean is a 22 year old female who presents for pre-operative H & P in preparation for  Procedure Information     Date/Time: 1/4/23     Procedure: RIGHT ARTHROSCOPY SHOULDER, WITH ARTHROSCOPIC BANKART REPAIR AND  CAPSULAR PLICATION. POSSIBLE OPEN SUBPECTORAL BICEPS TENODESIS    Anesthesia type: Choice with block     Pre-op diagnosis: Chronic right shoulder pain     Location: [facility]    Providers: Dr. Alejandre          The patient is a 22 year old woman who has a past medical history significant for seasonal allergies, amenorrhea and chronic right shoulder pain. The patient underwent prior right shoulder labral repair in 9/2020 but has had return of pain after re-injuring herself. She met with Dr. Alejandre on 9/27/22 and has been scheduled for the procedure as above.     History is obtained from the patient and chart review    Hx of abnormal bleeding or anti-platelet use: none    Menstrual history: No LMP recorded (lmp unknown). (Menstrual status: Amenorrhea).:      Past Medical History  Past Medical History:   Diagnosis Date     Amenorrhea      Chronic right shoulder pain      Seasonal allergic rhinitis        Past Surgical History  Past Surgical History:   Procedure Laterality Date     ARTHROSCOPY SHOULDER SUPERIOR LABRUM ANTERIOR TO POSTERIOR REPAIR Right 09/09/2020    Procedure: right shoulder arthroscopy, posterior labral repair;  Surgeon: Emiliano Alejandre MD;  Location:  OR     C UNLISTED PROCEDURE, ABDOMEN/PERITONEUM/OMENTUM      Description: Hernia Repair;  Recorded: 12/19/2012;  Comments: 2006     INGUINAL HERNIA  REPAIR  01/01/2007     wisdom teeth extraction         Prior to Admission Medications  Current Outpatient Medications   Medication Sig Dispense Refill     loratadine (CLARITIN) 10 MG tablet Take 10 mg by mouth as needed       diclofenac (VOLTAREN) 50 MG EC tablet Take 1 tablet (50 mg) by mouth 2 times daily as needed for moderate pain (Patient not taking: Reported on 11/17/2021) 42 tablet 0     diclofenac (VOLTAREN) 50 MG EC tablet TAKE 1 TABLET (50 MG) BY MOUTH 2 TIMES DAILY AS NEEDED FOR MODERATE PAIN (Patient not taking: Reported on 12/5/2022) 42 tablet 0       Allergies  No Known Allergies    Social History  Social History     Socioeconomic History     Marital status: Single     Spouse name: Not on file     Number of children: Not on file     Years of education: Not on file     Highest education level: Not on file   Occupational History     Not on file   Tobacco Use     Smoking status: Never     Smokeless tobacco: Never   Substance and Sexual Activity     Alcohol use: Yes     Comment: occasional     Drug use: Never     Sexual activity: Not on file   Other Topics Concern     Not on file   Social History Narrative     Not on file     Social Determinants of Health     Financial Resource Strain: Not on file   Food Insecurity: Not on file   Transportation Needs: Not on file   Physical Activity: Not on file   Stress: Not on file   Social Connections: Not on file   Intimate Partner Violence: Not on file   Housing Stability: Not on file       Family History  Family History   Problem Relation Age of Onset     Hyperlipidemia Mother      Hypertension Maternal Grandmother      Cancer Paternal Grandmother      Cancer Paternal Grandfather         Skin     Hypertension Other      Anesthesia Reaction No family hx of      Venous thrombosis No family hx of        Review of Systems  The complete review of systems is negative other than noted in the HPI or here.   Anesthesia Evaluation   Pt has had prior anesthetic. Type: General  "and Regional.    No history of anesthetic complications       ROS/MED HX  ENT/Pulmonary:     (+) allergic rhinitis,     Neurologic:  - neg neurologic ROS     Cardiovascular:       METS/Exercise Tolerance: >4 METS    Hematologic:  - neg hematologic  ROS     Musculoskeletal: Comment: S/p Right RIGHT ARTHROSCOPY SHOULDER, WITH ARTHROSCOPIC BANKART REPAIR AND  CAPSULAR PLICATION. POSSIBLE OPEN SUBPECTORAL BICEPS TENODESIS 9/2020    Ongoing shoulder pain       GI/Hepatic:  - neg GI/hepatic ROS  (-) GERD   Renal/Genitourinary: Comment: amenorrhea       Endo:  - neg endo ROS     Psychiatric/Substance Use:  - neg psychiatric ROS     Infectious Disease:  - neg infectious disease ROS     Malignancy:  - neg malignancy ROS     Other:  - neg other ROS          /65 (BP Location: Right arm, Patient Position: Sitting, Cuff Size: Adult Regular)   Pulse 53   Temp 98.3  F (36.8  C) (Oral)   Resp 16   Ht 1.6 m (5' 3\")   Wt 54.2 kg (119 lb 8 oz)   LMP  (LMP Unknown)   SpO2 98%   Breastfeeding No   BMI 21.17 kg/m      Physical Exam   Constitutional: Awake, alert, cooperative, no apparent distress, and appears stated age.  Eyes: Pupils equal, round and reactive to light, extra ocular muscles intact, sclera clear, conjunctiva normal.  HENT: Normocephalic, oral pharynx with moist mucus membranes, good dentition. No goiter appreciated.   Respiratory: Clear to auscultation bilaterally, no crackles or wheezing.  Cardiovascular: Regular rate and rhythm, normal S1 and S2, and no murmur noted.  Carotids +2, no bruits. No edema. Palpable pulses to radial  DP and PT arteries.   GI: Normal bowel sounds, soft, non-distended, non-tender, no masses palpated, no hepatosplenomegaly.    Lymph/Hematologic: No cervical lymphadenopathy and no supraclavicular lymphadenopathy.  Genitourinary:  defer  Skin: Warm and dry.  No rashes at anticipated surgical site.   Musculoskeletal: Full ROM of neck. There is no redness, warmth, or swelling of the " joints. Gross motor strength is normal.    Neurologic: Awake, alert, oriented to name, place and time. Cranial nerves II-XII are grossly intact. Gait is normal.   Neuropsychiatric: Calm, cooperative. Normal affect.     Prior Labs/Diagnostic Studies   All labs and imaging personally reviewed    Latest Reference Range & Units 12/07/21 08:05   Sodium 133 - 144 mmol/L 137   Potassium 3.4 - 5.3 mmol/L 4.1   Chloride 94 - 109 mmol/L 104   Carbon Dioxide 20 - 32 mmol/L 31   Urea Nitrogen 7 - 30 mg/dL 18   Creatinine 0.52 - 1.04 mg/dL 0.71   GFR Estimate >60 mL/min/1.73m2 >90   Calcium 8.5 - 10.1 mg/dL 9.1   Anion Gap 3 - 14 mmol/L 2 (L)   ALT 0 - 50 U/L 45   AST 0 - 45 U/L 22   CRP Inflammation 0.0 - 8.0 mg/L <2.9   Free T3 2.3 - 4.2 pg/mL 1.6 (L)   Vitamin B12 193 - 986 pg/mL 1,920 (H)   Glucose 70 - 99 mg/dL 78      Latest Reference Range & Units 12/14/21 14:04   WBC 4.0 - 11.0 10e3/uL 3.2 (L)   Hemoglobin 11.7 - 15.7 g/dL 13.0   Hematocrit 35.0 - 47.0 % 38.8   Platelet Count 150 - 450 10e3/uL 179   RBC Count 3.80 - 5.20 10e6/uL 3.94   MCV 78 - 100 fL 99   MCH 26.5 - 33.0 pg 33.0   MCHC 31.5 - 36.5 g/dL 33.5   RDW 10.0 - 15.0 % 12.0   % Neutrophils % 71   % Lymphocytes % 19   % Monocytes % 8   % Eosinophils % 1   % Basophils % 1   Absolute Basophils 0.0 - 0.2 10e3/uL 0.0   Absolute Eosinophils 0.0 - 0.7 10e3/uL 0.0   Absolute Immature Granulocytes <=0.4 10e3/uL 0.0   Absolute Lymphocytes 0.8 - 5.3 10e3/uL 0.6 (L)   Absolute Monocytes 0.0 - 1.3 10e3/uL 0.2   % Immature Granulocytes % 0   Absolute Neutrophils 1.6 - 8.3 10e3/uL 2.3   Absolute NRBCs 10e3/uL 0.0   NRBCs per 100 WBC <1 /100 0   % Retic 0.5 - 2.0 % 0.9   Absolute Retic 0.025 - 0.095 10e6/uL 0.036   Sed Rate 0 - 20 mm/hr 7       EKG/ stress test - if available please see in ROS above   No results found.  No flowsheet data found.      The patient's records and results personally reviewed by this provider.     Outside records reviewed from: Care  "Everywhere    Assessment      Leonela Mclean is a 22 year old female seen as a PAC referral for risk assessment and optimization for anesthesia.    Plan/Recommendations  Pt will be optimized for the proposed procedure.  See below for details on the assessment, risk, and preoperative recommendations    NEUROLOGY  - No history of TIA, CVA or seizure  -Post Op delirium risk factors:  No risk identified    ENT  - No current airway concerns.  Will need to be reassessed day of surgery.  Mallampati: I  TM: > 3    CARDIAC  - No history of CAD, Hypertension and Afib  - METS (Metabolic Equivalents)  Patient performs 4 or more METS exercise without symptoms            Total Score: 0      RCRI-Very low risk: Class 1 0.4% complication rate            Total Score: 0        PULMONARY  - Obstructive Sleep Apnea  No current risk of obstructive sleep apnea   CHADD Low Risk            Total Score: 0      - Denies asthma or inhaler use  - Tobacco History      History   Smoking Status     Never   Smokeless Tobacco     Never       GI  PONV High Risk  Total Score: 3           1 AN PONV: Pt is Female    1 AN PONV: Patient is not a current smoker    1 AN PONV: Intended Post Op Opioids        /RENAL  - Baseline Creatinine  0.71  ~ Amenorrhea     ENDOCRINE    - BMI: Estimated body mass index is 21.17 kg/m  as calculated from the following:    Height as of this encounter: 1.6 m (5' 3\").    Weight as of this encounter: 54.2 kg (119 lb 8 oz).  Healthy Weight (BMI 18.5-24.9)  - No history of Diabetes Mellitus    HEME  VTE Low Risk 0.26%            Total Score: 0      - No history of abnormal bleeding or antiplatelet use.    MSK  ~ Chronic right shoulder pain - procedure as above.     PSYCH  - Last year the patient had been meeting with Dr. Shaffer for an eating disorder. She reports she met with a nutritionist and has made improvements. Her weight today and BMI are within normal limits.     Different anesthesia methods/types have been discussed " with the patient, but they are aware that the final plan will be decided by the assigned anesthesia provider on the date of service.    The patient is optimized for their procedure. AVS with information on surgery time/arrival time, meds and NPO status given by nursing staff. No further diagnostic testing indicated.      On the day of service:     Prep time: 10 minutes  Visit time: 11 minutes  Documentation time: 7 minutes  ------------------------------------------  Total time: 28 minutes      Francisca Richards PA-C  Preoperative Assessment Center  [facility]  Phone: [phone]  Fax: [phone]

## 2022-12-05 NOTE — PATIENT INSTRUCTIONS
Preparing for Your Surgery      Name:  Leonela Mclean   MRN:  [id number]   :  2000   Today's Date:  2022         Arriving for surgery:  Surgery date:  23  Arrival time:  5:45 am    Restrictions due to COVID 19:    2 visitors may accompany each patient  Visitors may wait for patient in the Surgery Center Waiting room  All visitors must wear a mask and socially distance        parking is available for anyone with mobility limitations or disabilities. (Monday- Friday 7 am- 5 pm)    Please come to:    [facility]  [address] [place] [id number]    Check in on the 5th floor, Ambulatory Surgery Center.    What can I eat or drink?    -  You may eat and drink normally until 8 hours before arrival time  (Until 9:45 pm on 1/3/23)  -  You may have clear liquids until 2 hours before arrival time  (Until 3:45 am on 23)    Examples of clear liquids:  Water  Clear broth  Juices (apple, white grape, white cranberry  and cider) without pulp  Noncarbonated, powder based beverages  (lemonade and Dru-Aid)  Sodas (Sprite, 7-Up, ginger ale and seltzer)  Coffee or tea (without milk or cream)  Gatorade    --No alcohol for at least 24 hours before surgery    Which medicines can I take?    Hold Aspirin for 7 days before surgery.   Hold Multivitamins for 7 days before surgery.  Hold Supplements for 7 days before surgery.  Hold Ibuprofen (Advil, Motrin) for 1 day before surgery--unless otherwise directed by surgeon.  Hold Naproxen (Aleve) for 4 days before surgery.    -  DO NOT take the following medications the day of surgery:  See above    -  PLEASE TAKE the following medications the day of surgery   Loratadine (Claritin) if needed    How do I prepare myself?  - Please take 2 showers before surgery using Scrubcare or Hibiclens soap.    Use this soap only from the neck to your toes.     Leave the soap on your skin for one minute--then rinse thoroughly.      You may use your own  shampoo and conditioner; no other hair products.   - Please remove all jewelry and body piercings.  - No lotions, deodorants or fragrance.  - No makeup or fingernail polish.   - Bring your ID and insurance card.      ALL PATIENTS ARE REQUIRED TO HAVE A RESPONSIBLE ADULT TO DRIVE AND BE IN ATTENDANCE WITH THEM FOR 24 HOURS FOLLOWING SURGERY       Covid testing policy as of 12/06/2022  Your surgeon will notify and schedule you for a COVID test if one is needed before surgery--please direct any questions or COVID symptoms to your surgeon      Questions or Concerns:    -For questions regarding the day of surgery please contact the Ambulatory Surgery Center at [phone].    -If you have health changes between today and your surgery please contact your surgeon.     For questions after surgery please call your surgeons office.

## 2023-01-03 ENCOUNTER — ANESTHESIA EVENT (OUTPATIENT)
Dept: SURGERY | Facility: AMBULATORY SURGERY CENTER | Age: 23
End: 2023-01-03
Payer: COMMERCIAL

## 2023-01-03 RX ORDER — NALOXONE HYDROCHLORIDE 0.4 MG/ML
0.2 INJECTION, SOLUTION INTRAMUSCULAR; INTRAVENOUS; SUBCUTANEOUS
Status: DISCONTINUED | OUTPATIENT
Start: 2023-01-03 | End: 2023-01-05 | Stop reason: HOSPADM

## 2023-01-03 RX ORDER — NALOXONE HYDROCHLORIDE 0.4 MG/ML
0.4 INJECTION, SOLUTION INTRAMUSCULAR; INTRAVENOUS; SUBCUTANEOUS
Status: DISCONTINUED | OUTPATIENT
Start: 2023-01-03 | End: 2023-01-05 | Stop reason: HOSPADM

## 2023-01-04 ENCOUNTER — ANESTHESIA (OUTPATIENT)
Dept: SURGERY | Facility: AMBULATORY SURGERY CENTER | Age: 23
End: 2023-01-04
Payer: COMMERCIAL

## 2023-01-04 ENCOUNTER — HOSPITAL ENCOUNTER (OUTPATIENT)
Facility: AMBULATORY SURGERY CENTER | Age: 23
Discharge: HOME | End: 2023-01-04
Attending: ORTHOPAEDIC SURGERY
Payer: COMMERCIAL

## 2023-01-04 VITALS
SYSTOLIC BLOOD PRESSURE: 97 MMHG | DIASTOLIC BLOOD PRESSURE: 48 MMHG | HEIGHT: 63 IN | BODY MASS INDEX: 21.26 KG/M2 | WEIGHT: 120 LBS | RESPIRATION RATE: 14 BRPM | TEMPERATURE: 97.9 F | HEART RATE: 64 BPM | OXYGEN SATURATION: 95 %

## 2023-01-04 DIAGNOSIS — G89.29 CHRONIC RIGHT SHOULDER PAIN: ICD-10-CM

## 2023-01-04 DIAGNOSIS — M25.319 INSTABILITY OF SHOULDER JOINT, UNSPECIFIED LATERALITY: Primary | ICD-10-CM

## 2023-01-04 DIAGNOSIS — M25.511 CHRONIC RIGHT SHOULDER PAIN: ICD-10-CM

## 2023-01-04 LAB
HCG UR QL: NEGATIVE
INTERNAL QC OK POCT: NORMAL
POCT KIT EXPIRATION DATE: NORMAL
POCT KIT LOT NUMBER: NORMAL

## 2023-01-04 PROCEDURE — 29806 SHO ARTHRS SRG CAPSULORRAPHY: CPT | Mod: RT

## 2023-01-04 PROCEDURE — 29806 SHO ARTHRS SRG CAPSULORRAPHY: CPT | Mod: RT | Performed by: ORTHOPAEDIC SURGERY

## 2023-01-04 PROCEDURE — 81025 URINE PREGNANCY TEST: CPT | Performed by: PATHOLOGY

## 2023-01-04 DEVICE — KL 1.8 FIBERTAK, SHOULDER
Type: IMPLANTABLE DEVICE | Site: SHOULDER | Status: FUNCTIONAL
Brand: ARTHREX®

## 2023-01-04 RX ORDER — ONDANSETRON 4 MG/1
4 TABLET, ORALLY DISINTEGRATING ORAL EVERY 30 MIN PRN
Status: DISCONTINUED | OUTPATIENT
Start: 2023-01-04 | End: 2023-01-05 | Stop reason: HOSPADM

## 2023-01-04 RX ORDER — PROPOFOL 10 MG/ML
INJECTION, EMULSION INTRAVENOUS PRN
Status: DISCONTINUED | OUTPATIENT
Start: 2023-01-04 | End: 2023-01-04

## 2023-01-04 RX ORDER — AMOXICILLIN 250 MG
1-2 CAPSULE ORAL 2 TIMES DAILY
Qty: 30 TABLET | Refills: 0 | Status: SHIPPED | OUTPATIENT
Start: 2023-01-04 | End: 2024-04-09

## 2023-01-04 RX ORDER — ACETAMINOPHEN 325 MG/1
975 TABLET ORAL ONCE
Status: COMPLETED | OUTPATIENT
Start: 2023-01-04 | End: 2023-01-04

## 2023-01-04 RX ORDER — LIDOCAINE HYDROCHLORIDE 20 MG/ML
INJECTION, SOLUTION INFILTRATION; PERINEURAL PRN
Status: DISCONTINUED | OUTPATIENT
Start: 2023-01-04 | End: 2023-01-04

## 2023-01-04 RX ORDER — FLUMAZENIL 0.1 MG/ML
0.2 INJECTION, SOLUTION INTRAVENOUS
Status: DISCONTINUED | OUTPATIENT
Start: 2023-01-04 | End: 2023-01-04 | Stop reason: HOSPADM

## 2023-01-04 RX ORDER — EPHEDRINE SULFATE 50 MG/ML
INJECTION, SOLUTION INTRAMUSCULAR; INTRAVENOUS; SUBCUTANEOUS PRN
Status: DISCONTINUED | OUTPATIENT
Start: 2023-01-04 | End: 2023-01-04

## 2023-01-04 RX ORDER — ACETAMINOPHEN 325 MG/1
650 TABLET ORAL EVERY 4 HOURS PRN
Qty: 50 TABLET | Refills: 0 | Status: SHIPPED | OUTPATIENT
Start: 2023-01-04 | End: 2024-04-19

## 2023-01-04 RX ORDER — OXYCODONE HYDROCHLORIDE 5 MG/1
5 TABLET ORAL EVERY 4 HOURS PRN
Qty: 30 TABLET | Refills: 0 | Status: SHIPPED | OUTPATIENT
Start: 2023-01-04 | End: 2024-04-09

## 2023-01-04 RX ORDER — PROPOFOL 10 MG/ML
INJECTION, EMULSION INTRAVENOUS CONTINUOUS PRN
Status: DISCONTINUED | OUTPATIENT
Start: 2023-01-04 | End: 2023-01-04

## 2023-01-04 RX ORDER — LIDOCAINE 40 MG/G
CREAM TOPICAL
Status: DISCONTINUED | OUTPATIENT
Start: 2023-01-04 | End: 2023-01-04 | Stop reason: HOSPADM

## 2023-01-04 RX ORDER — IBUPROFEN 600 MG/1
600 TABLET, FILM COATED ORAL EVERY 6 HOURS PRN
Qty: 30 TABLET | Refills: 0 | Status: SHIPPED | OUTPATIENT
Start: 2023-01-04 | End: 2024-04-19

## 2023-01-04 RX ORDER — SODIUM CHLORIDE, SODIUM LACTATE, POTASSIUM CHLORIDE, CALCIUM CHLORIDE 600; 310; 30; 20 MG/100ML; MG/100ML; MG/100ML; MG/100ML
INJECTION, SOLUTION INTRAVENOUS CONTINUOUS
Status: DISCONTINUED | OUTPATIENT
Start: 2023-01-04 | End: 2023-01-05 | Stop reason: HOSPADM

## 2023-01-04 RX ORDER — FENTANYL CITRATE 50 UG/ML
25 INJECTION, SOLUTION INTRAMUSCULAR; INTRAVENOUS
Status: DISCONTINUED | OUTPATIENT
Start: 2023-01-04 | End: 2023-01-05 | Stop reason: HOSPADM

## 2023-01-04 RX ORDER — IBUPROFEN 200 MG
600 TABLET ORAL
Status: DISCONTINUED | OUTPATIENT
Start: 2023-01-04 | End: 2023-01-05 | Stop reason: HOSPADM

## 2023-01-04 RX ORDER — BUPIVACAINE HYDROCHLORIDE AND EPINEPHRINE 2.5; 5 MG/ML; UG/ML
INJECTION, SOLUTION INFILTRATION; PERINEURAL PRN
Status: DISCONTINUED | OUTPATIENT
Start: 2023-01-04 | End: 2023-01-04 | Stop reason: HOSPADM

## 2023-01-04 RX ORDER — FENTANYL CITRATE 50 UG/ML
INJECTION, SOLUTION INTRAMUSCULAR; INTRAVENOUS PRN
Status: DISCONTINUED | OUTPATIENT
Start: 2023-01-04 | End: 2023-01-04

## 2023-01-04 RX ORDER — OXYCODONE HYDROCHLORIDE 5 MG/1
10 TABLET ORAL EVERY 4 HOURS PRN
Status: DISCONTINUED | OUTPATIENT
Start: 2023-01-04 | End: 2023-01-05 | Stop reason: HOSPADM

## 2023-01-04 RX ORDER — ONDANSETRON 4 MG/1
4 TABLET, ORALLY DISINTEGRATING ORAL EVERY 8 HOURS PRN
Qty: 8 TABLET | Refills: 0 | Status: SHIPPED | OUTPATIENT
Start: 2023-01-04 | End: 2024-04-19

## 2023-01-04 RX ORDER — ONDANSETRON 2 MG/ML
4 INJECTION INTRAMUSCULAR; INTRAVENOUS EVERY 30 MIN PRN
Status: DISCONTINUED | OUTPATIENT
Start: 2023-01-04 | End: 2023-01-05 | Stop reason: HOSPADM

## 2023-01-04 RX ORDER — FENTANYL CITRATE 50 UG/ML
25-50 INJECTION, SOLUTION INTRAMUSCULAR; INTRAVENOUS
Status: DISCONTINUED | OUTPATIENT
Start: 2023-01-04 | End: 2023-01-04 | Stop reason: HOSPADM

## 2023-01-04 RX ORDER — MEPERIDINE HYDROCHLORIDE 25 MG/ML
12.5 INJECTION INTRAMUSCULAR; INTRAVENOUS; SUBCUTANEOUS
Status: DISCONTINUED | OUTPATIENT
Start: 2023-01-04 | End: 2023-01-05 | Stop reason: HOSPADM

## 2023-01-04 RX ORDER — FENTANYL CITRATE 50 UG/ML
50 INJECTION, SOLUTION INTRAMUSCULAR; INTRAVENOUS EVERY 5 MIN PRN
Status: DISCONTINUED | OUTPATIENT
Start: 2023-01-04 | End: 2023-01-04 | Stop reason: HOSPADM

## 2023-01-04 RX ORDER — OXYCODONE HYDROCHLORIDE 5 MG/1
5 TABLET ORAL
Status: DISCONTINUED | OUTPATIENT
Start: 2023-01-04 | End: 2023-01-05 | Stop reason: HOSPADM

## 2023-01-04 RX ORDER — HYDROMORPHONE HYDROCHLORIDE 1 MG/ML
0.2 INJECTION, SOLUTION INTRAMUSCULAR; INTRAVENOUS; SUBCUTANEOUS EVERY 5 MIN PRN
Status: DISCONTINUED | OUTPATIENT
Start: 2023-01-04 | End: 2023-01-04 | Stop reason: HOSPADM

## 2023-01-04 RX ORDER — HYDROMORPHONE HYDROCHLORIDE 1 MG/ML
0.4 INJECTION, SOLUTION INTRAMUSCULAR; INTRAVENOUS; SUBCUTANEOUS EVERY 5 MIN PRN
Status: DISCONTINUED | OUTPATIENT
Start: 2023-01-04 | End: 2023-01-04 | Stop reason: HOSPADM

## 2023-01-04 RX ORDER — SODIUM CHLORIDE, SODIUM LACTATE, POTASSIUM CHLORIDE, CALCIUM CHLORIDE 600; 310; 30; 20 MG/100ML; MG/100ML; MG/100ML; MG/100ML
INJECTION, SOLUTION INTRAVENOUS CONTINUOUS
Status: DISCONTINUED | OUTPATIENT
Start: 2023-01-04 | End: 2023-01-04 | Stop reason: HOSPADM

## 2023-01-04 RX ORDER — GLYCOPYRROLATE 0.2 MG/ML
INJECTION, SOLUTION INTRAMUSCULAR; INTRAVENOUS PRN
Status: DISCONTINUED | OUTPATIENT
Start: 2023-01-04 | End: 2023-01-04

## 2023-01-04 RX ORDER — FENTANYL CITRATE 50 UG/ML
25 INJECTION, SOLUTION INTRAMUSCULAR; INTRAVENOUS EVERY 5 MIN PRN
Status: DISCONTINUED | OUTPATIENT
Start: 2023-01-04 | End: 2023-01-04 | Stop reason: HOSPADM

## 2023-01-04 RX ORDER — CEFAZOLIN SODIUM 2 G/50ML
2 SOLUTION INTRAVENOUS
Status: DISCONTINUED | OUTPATIENT
Start: 2023-01-04 | End: 2023-01-04 | Stop reason: HOSPADM

## 2023-01-04 RX ORDER — DEXAMETHASONE SODIUM PHOSPHATE 4 MG/ML
INJECTION, SOLUTION INTRA-ARTICULAR; INTRALESIONAL; INTRAMUSCULAR; INTRAVENOUS; SOFT TISSUE PRN
Status: DISCONTINUED | OUTPATIENT
Start: 2023-01-04 | End: 2023-01-04

## 2023-01-04 RX ORDER — HYDROXYZINE PAMOATE 25 MG/1
25 CAPSULE ORAL 3 TIMES DAILY PRN
Qty: 20 CAPSULE | Refills: 0 | Status: SHIPPED | OUTPATIENT
Start: 2023-01-04 | End: 2024-04-09

## 2023-01-04 RX ORDER — OXYCODONE HYDROCHLORIDE 5 MG/1
5 TABLET ORAL EVERY 4 HOURS PRN
Status: DISCONTINUED | OUTPATIENT
Start: 2023-01-04 | End: 2023-01-05 | Stop reason: HOSPADM

## 2023-01-04 RX ORDER — CEFAZOLIN SODIUM 2 G/50ML
2 SOLUTION INTRAVENOUS SEE ADMIN INSTRUCTIONS
Status: DISCONTINUED | OUTPATIENT
Start: 2023-01-04 | End: 2023-01-04 | Stop reason: HOSPADM

## 2023-01-04 RX ORDER — ONDANSETRON 2 MG/ML
INJECTION INTRAMUSCULAR; INTRAVENOUS PRN
Status: DISCONTINUED | OUTPATIENT
Start: 2023-01-04 | End: 2023-01-04

## 2023-01-04 RX ADMIN — PROPOFOL 220 MG: 10 INJECTION, EMULSION INTRAVENOUS at 07:23

## 2023-01-04 RX ADMIN — CEFAZOLIN SODIUM 2 G: 2 SOLUTION INTRAVENOUS at 07:18

## 2023-01-04 RX ADMIN — Medication 0.5 MG: at 09:07

## 2023-01-04 RX ADMIN — ONDANSETRON 4 MG: 2 INJECTION INTRAMUSCULAR; INTRAVENOUS at 07:43

## 2023-01-04 RX ADMIN — PROPOFOL 50 MG: 10 INJECTION, EMULSION INTRAVENOUS at 08:44

## 2023-01-04 RX ADMIN — FENTANYL CITRATE 50 MCG: 50 INJECTION, SOLUTION INTRAMUSCULAR; INTRAVENOUS at 08:34

## 2023-01-04 RX ADMIN — FENTANYL CITRATE 25 MCG: 50 INJECTION, SOLUTION INTRAMUSCULAR; INTRAVENOUS at 07:05

## 2023-01-04 RX ADMIN — PROPOFOL 50 MG: 10 INJECTION, EMULSION INTRAVENOUS at 08:34

## 2023-01-04 RX ADMIN — SODIUM CHLORIDE, SODIUM LACTATE, POTASSIUM CHLORIDE, CALCIUM CHLORIDE: 600; 310; 30; 20 INJECTION, SOLUTION INTRAVENOUS at 06:25

## 2023-01-04 RX ADMIN — EPHEDRINE SULFATE 10 MG: 50 INJECTION, SOLUTION INTRAMUSCULAR; INTRAVENOUS; SUBCUTANEOUS at 08:00

## 2023-01-04 RX ADMIN — PROPOFOL 100 MCG/KG/MIN: 10 INJECTION, EMULSION INTRAVENOUS at 08:30

## 2023-01-04 RX ADMIN — ACETAMINOPHEN 975 MG: 325 TABLET ORAL at 06:19

## 2023-01-04 RX ADMIN — PROPOFOL 50 MG: 10 INJECTION, EMULSION INTRAVENOUS at 09:04

## 2023-01-04 RX ADMIN — GLYCOPYRROLATE 0.2 MG: 0.2 INJECTION, SOLUTION INTRAMUSCULAR; INTRAVENOUS at 07:50

## 2023-01-04 RX ADMIN — PROPOFOL 200 MCG/KG/MIN: 10 INJECTION, EMULSION INTRAVENOUS at 07:23

## 2023-01-04 RX ADMIN — PROPOFOL 50 MG: 10 INJECTION, EMULSION INTRAVENOUS at 09:06

## 2023-01-04 RX ADMIN — LIDOCAINE HYDROCHLORIDE 60 MG: 20 INJECTION, SOLUTION INFILTRATION; PERINEURAL at 07:23

## 2023-01-04 RX ADMIN — FENTANYL CITRATE 50 MCG: 50 INJECTION, SOLUTION INTRAMUSCULAR; INTRAVENOUS at 08:44

## 2023-01-04 RX ADMIN — DEXAMETHASONE SODIUM PHOSPHATE 4 MG: 4 INJECTION, SOLUTION INTRA-ARTICULAR; INTRALESIONAL; INTRAMUSCULAR; INTRAVENOUS; SOFT TISSUE at 07:38

## 2023-01-04 NOTE — OR NURSING
Patient received right side Interscalene nerve block  with Exparel.  Fentanyl 25mcg and Versed 1mg given. Tolerated procedure well.

## 2023-01-04 NOTE — ANESTHESIA PREPROCEDURE EVALUATION
Anesthesia Pre-Procedure Evaluation    Patient: Leonela Mclean   MRN: [id number] : 2000        Procedure : Procedure(s):  RIGHT ARTHROSCOPY SHOULDER, WITH ARTHROSCOPIC BANKART REPAIR AND  CAPSULAR PLICATION.  POSSIBLE OPEN SUBPECTORAL BICEPS TENODESIS          Past Medical History:   Diagnosis Date     Amenorrhea      Chronic right shoulder pain      Seasonal allergic rhinitis       Past Surgical History:   Procedure Laterality Date     ARTHROSCOPY SHOULDER SUPERIOR LABRUM ANTERIOR TO POSTERIOR REPAIR Right 2020    Procedure: right shoulder arthroscopy, posterior labral repair;  Surgeon: Emiliano Alejandre MD;  Location: [facility] OR     C UNLISTED PROCEDURE, ABDOMEN/PERITONEUM/OMENTUM      Description: Hernia Repair;  Recorded: 2012;  Comments:      INGUINAL HERNIA REPAIR  2007     wisdom teeth extraction        No Known Allergies   Social History     Tobacco Use     Smoking status: Never     Smokeless tobacco: Never   Substance Use Topics     Alcohol use: Yes     Comment: occasional      Wt Readings from Last 1 Encounters:   23 54.4 kg (120 lb)        Anesthesia Evaluation            ROS/MED HX  ENT/Pulmonary:     (+) allergic rhinitis,     Neurologic:       Cardiovascular:       METS/Exercise Tolerance:     Hematologic:       Musculoskeletal:       GI/Hepatic:       Renal/Genitourinary:       Endo:       Psychiatric/Substance Use:       Infectious Disease:       Malignancy:       Other:            Physical Exam    Airway  airway exam normal      Mallampati: II   TM distance: > 3 FB   Neck ROM: full   Mouth opening: > 3 cm    Respiratory Devices and Support         Dental  no notable dental history         Cardiovascular          Rhythm and rate: regular and normal     Pulmonary   pulmonary exam normal        breath sounds clear to auscultation           OUTSIDE LABS:  CBC:   Lab Results   Component Value Date    WBC 3.2 (L) 2021    WBC 3.0 (L) 2021    HGB 13.0  12/14/2021    HGB 13.3 12/07/2021    HCT 38.8 12/14/2021    HCT 40.3 12/07/2021     12/14/2021     12/07/2021     BMP:   Lab Results   Component Value Date     12/07/2021     10/15/2021    POTASSIUM 4.1 12/07/2021    POTASSIUM 4.1 10/15/2021    CHLORIDE 104 12/07/2021    CHLORIDE 105 10/15/2021    CO2 31 12/07/2021    CO2 30 10/15/2021    BUN 18 12/07/2021    BUN 17 10/15/2021    CR 0.71 12/07/2021    CR 1.02 10/15/2021    GLC 78 12/07/2021    GLC 85 10/15/2021     COAGS: No results found for: PTT, INR, FIBR  POC:   Lab Results   Component Value Date    HCG Negative 01/04/2023     HEPATIC:   Lab Results   Component Value Date    ALBUMIN 4.2 10/15/2021    PROTTOTAL 7.1 10/15/2021    ALT 45 12/07/2021    AST 22 12/07/2021    ALKPHOS 41 10/15/2021    BILITOTAL 0.6 10/15/2021     OTHER:   Lab Results   Component Value Date    KENYATTA 9.1 12/07/2021    MAG 2.2 09/12/2020    TSH 2.00 10/15/2021    CRP <2.9 12/07/2021    SED 7 12/14/2021       Anesthesia Plan    ASA Status:  1   NPO Status:  NPO Appropriate    Anesthesia Type: General.     - Airway: LMA   Induction: Intravenous.   Maintenance: TIVA.        Consents    Anesthesia Plan(s) and associated risks, benefits, and realistic alternatives discussed. Questions answered and patient/representative(s) expressed understanding.    - Discussed:     - Discussed with:  Patient      - Extended Intubation/Ventilatory Support Discussed: No.      - Patient is DNR/DNI Status: No    Use of blood products discussed: No .     Postoperative Care    Pain management: IV analgesics, Oral pain medications, Multi-modal analgesia.   PONV prophylaxis: Ondansetron (or other 5HT-3), Background Propofol Infusion     Comments:                Renny Neal MD

## 2023-01-04 NOTE — BRIEF OP NOTE
[facility] Brief Operative Note    Pre-operative diagnosis: Right shoulder labral tear  Right shoulder instability  Right shoulder possible biceps tendinopathy     Post-operative diagnosis Right shoulder labral tear  Right shoulder instability   Procedure: Procedure(s):  RIGHT ARTHROSCOPY SHOULDER, WITH ARTHROSCOPIC BANKART REPAIR AND  CAPSULAR PLICATION.   Surgeon(s): Surgeon(s) and Role:     * Emiliano Alejandre MD - Primary   Chester Easley MD Fellow   Estimated blood loss: 5 ml   Specimens: * No specimens in log *   Findings: GETA + block, 1.8 x 6

## 2023-01-04 NOTE — DISCHARGE INSTRUCTIONS
[facility]  Home Care Following Anesthesia  For 24 hours after surgery:  Get plenty of rest.  A responsible adult must stay with you for at least 24 hours after you leave the surgery center.  Do not drive or use heavy equipment.  If you have weakness or tingling, don't drive or use heavy equipment until this feeling goes away.   Do not drink alcohol.   Avoid strenuous or risky activities.  Ask for help when climbing stairs.  You may feel lightheaded.  IF so, sit for a few minutes before standing.  Have someone help you get up.   If you have nausea (feel sick to your stomach): Drink only clear liquids such as apple juice, ginger ale, broth or 7-Up.  Rest may also help.  Be sure to drink enough fluids.  Move to a regular diet as you feel able.   You may have a slight fever.  Call the doctor if your fever is over 100 F (37.7 C) (taken under the tongue) or lasts longer than 24 hours.  You may have a dry mouth, a sore throat, muscle aches or trouble sleeping. These should go away after 24 hours.  Do not make important or legal decisions.   It is recommended to avoid smoking.   If you use hormonal birth control (such as the pill, patch, ring or implants):  You will need a second form of birth control for 7 days (condoms, a diaphragm or contraceptive foam).  While in the surgery center, you received a medicine called Sugammadex.  Hormonal birth control (such as the pill, patch, ring or implants) will not work as well for a week after taking this medicine.         Tips for taking pain medications  To get the best pain relief possible, remember these points:  Take pain medications as directed, before pain becomes severe.  Pain medication can upset your stomach: taking it with food may help.  Constipation is a common side effect of pain medication. Drink plenty of  fluids.  Eat foods high in fiber. Take a stool softener if recommended by your doctor or pharmacist.  Do not drink alcohol,  drive or operate machinery while taking pain medications.  Ask about other ways to control pain, such as with heat, ice or relaxation.    Tylenol/Acetaminophen Consumption  To help encourage the safe use of acetaminophen, the makers of TYLENOL  have lowered the maximum daily dose for single-ingredient Extra Strength TYLENOL  (acetaminophen) products sold in the [place] from 8 pills per day (4,000 mg) to 6 pills per day (3,000 mg). The dosing interval has also changed from 2 pills every 4-6 hours to 2 pills every 6 hours.  If you feel your pain relief is insufficient, you may take Tylenol/Acetaminophen in addition to your narcotic pain medication.   Be careful not to exceed 3,000 mg of Tylenol/Acetaminophen in a 24 hour period from all sources.  If you are taking extra strength Tylenol/acetaminophen (500 mg), the maximum dose is 6 tablets in 24 hours.  If you are taking regular strength acetaminophen (325 mg), the maximum dose is 9 tablets in 24 hours.    Call a doctor for any of the following:  Signs of infection (fever, growing tenderness at the surgery site, a large amount of drainage or bleeding, severe pain, foul-smelling drainage, redness, swelling).  It has been over 8 to 10 hours since surgery and you are still not able to urinate (pass water).  Headache for over 24 hours.  Numbness, tingling or weakness the day after surgery (if you had spinal anesthesia).  Signs of Covid-19 infection (temperature over 100 degrees, shortness of breath, cough, loss of taste/smell, generalized body aches, persistent headache, chills, sore throat, nausea/vomiting/diarrhea)  Your doctor is:       Dr. Emiliano Alejandre, Orthopaedics: [phone]               Or dial [phone] and ask for the resident on call for:  Orthopaedics  For emergency care, call the:  [facility] Emergency Department: [phone] (TTY for hearing impaired: [phone])  Information about liposomal bupivacaine (Exparel)    What is Liposomal  "Bupivacaine?    Liposomal Bupivacaine is a numbing medication that can help you manage your pain after surgery.  This medication is similar to \"novacaine,\" which is often used by the dentist.  Liposomal bupivacaine is released slowly and can help control pain for up to 72 hours.    What is the purpose of Liposomal Bupivacaine?  To manage your pain after surgery  To help you sleep better, take deep breaths, walk more comfortable, and feel up to visiting with others    How is the procedure done?  Liposomal bupivacaine is a medication given by an injection.  It is usually given right before your surgery.  If this is the case, you will be awake or sedated, but you should experience minimal pain during the procedure.  For some people, the injection may be given at the very end of your surgery.  It all depends on the type of surgery and your situation.  The procedure usually takes about 5-15 minutes.  An ultrasound machine will help the anesthesiologist insert it in the right place or the surgeon will inject it under direct vision.   A needle is used to place the numbing medication under your skin.  It provides pain relief by numbing the tissue in the area where your surgeon will make the incision.    What can I expect?  You may experience numbness, tingling, or a feeling of heaviness around the area that was injected.  If you experience any of the follow symptoms IMMEDIATELY CALL THE REGIONAL ANESTHESIA PAIN SERVICE:  Numbness or tingling occurs in areas other than around the injection site  Blurry vision  Ringing in your ears  A metallic taste in your mouth    CALL the Regional Anesthesia Pain Service at:  [phone].  Press \"4\" for the  and let the hospital  know that you are having a problem with a nerve block and that you would like to page the \"adult care inpatient pain management/[facility] team\".  From 7 am - 5 pm, page the \"staff\" physician  From 5 pm - 7 am, page the \"resident\" physician " "(if no response from \"resident\" physician, call the  back and the \"staff\" physician).    You should not receive any other type of numbing medication within 4 days after receiving liposomal bupivacaine unless your anesthesiologist approves.      Post Operative Instructions: Regional Anesthetic for Upper Extremity with Liposomal Bupivacaine  General Information:   Regional anesthesia is when local anesthetic or  numbing  medication is injected around the nerves to anesthetize or  numb  the area supplied by that set of nerves. It is a type of analgesia used to control pain and decreases the need for narcotics following surgery.    Types of Regional Blocks:  Interscalene: A block injected into the neck on the operative shoulder/arm of a patient having shoulder surgery  Supraclavicular: A block injected near the clavicle on the operative shoulder of a patient having elbow, forearm, or hand surgery    Procedure:  The type of anesthesia your doctor used to numb your shoulder or arm will usually not start to wear off for 24-48 hours, but may last as long as 72 hours. You should be careful during that period, since it is possible to injure your arm without being aware of the injury. While your arm is numb, you should:  Avoid striking or bumping your arm  Avoid extreme hot or cold    Diet:  There are no restrictions on your diet. You should drink plenty of fluids.     Discomfort:  You will have a tingling and prickly sensation in your arm as the feeling begins to return. You can also expect some discomfort. The amount of discomfort is unpredictable, but if you have more pain than can be controlled with pain medication you should notify your physician.     Pain Medications:  Begin taking your oral pain pills before bedtime and during the night to avoid a sudden onset of pain as part of the block wears off.  Do not engage in drinking, driving, or hazardous occupations while taking pain medication.     Stitches:   You " may have stitches or special skin closures. You doctor will inform you when to return to the office to have them removed.     Activity:  On the day of surgery you should try to stay in bed with your hand elevated on pillows. You may resume your normal activity after that, wearing a sling for comfort. Contact your physician if you have any of the problems:   Continued numbness or tingling in the arm or hand after 72 hours  Swelling of the fingers or fingers that are cold to the touch  Excessive bleeding or drainage  Severe pain

## 2023-01-04 NOTE — OP NOTE
Procedure Date: 01/04/2023    PREOPERATIVE DIAGNOSES:    1.  Right shoulder instability.  2.  Right shoulder anterior labral tear.  3.  Right shoulder possible biceps tendinopathy.    POSTOPERATIVE DIAGNOSES:  1.  Right shoulder instability.  2.  Right shoulder anterior and inferior labral tear.    SURGEON:  Emiliano Alejandre MD    ASSISTANT:  Dane Easley MD, Orthopedic Fellow.  No resident was available for assistance.    ANESTHETIC:  Interscalene block plus general.    DRAINS:  None.    COUNTS:  Sponge and needle count were correct.    MATERIAL FORWARDED TO LAB:  None.    OPERATION PERFORMED:  Right shoulder arthroscopic labral repair with capsulorrhaphy.    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  She did well for quite some period of time.  She developed some biceps tendinitis.  We have attempted to manage this nonoperatively.  More recently, she has developed instability.  She feels that her shoulder feels unstable anteriorly and inferiorly.  The instability is more of a problem than her biceps pain.  Leonela and I had a long conversation in clinic regarding options.  She has failed nonoperative treatment including extensive rehabilitation.  We discussed surgical management.  I explained how we would evaluate the shoulder and perform a labral repair and a capsulorrhaphy.  We would evaluate the biceps tendon, and if there was tendinosis, we would perform a biceps tenodesis.  We discussed the risks of surgery and this is well-documented in my clinic note.  On the day of surgery, I met with the patient and reviewed the procedure.  I answered her questions.  She understands the surgery, as well as the risks, and would like to proceed.    OPERATIVE FINDINGS:  Examination under anesthesia reveals full range of motion.  3A anterior load and shift.  Negative posterior load and shift.  The diagnostic arthroscopy  reveals a normal superior labrum.  The biceps tendon itself is normal in appearance.  The superior and posterior rotator cuff are normal.  The subscapularis is normal.  There is no evidence of a HAGL lesion.  There is no Hill-Sachs.  There is no bone loss on the glenoid.  The previously repaired posterior labrum is intact.  There is anterior labral splitting from the 2 o'clock position down to the 6:30 position.    IMPLANTS:  Arthrex 1.8 knotless FiberTak anchors x6.    DESCRIPTION OF THE OPERATION:  After the patient was counseled, plans, alternatives and risks were discussed, consent was obtained.  The correct operative extremity was marked in the preoperative holding area.  Preoperative antibiotics were administered.  An interscalene block anesthetic was administered.  The patient was brought back to the operating suite and administered a general anesthetic.  The examination under anesthesia was performed.  The findings are noted above.  The patient was placed in the lateral decubitus position with bony prominences well-padded and an axillary roll in place.  The right upper extremity was prepped and draped in the usual sterile fashion.  A timeout process was completed.  A standard posterior arthroscopy portal was created, followed by an anterosuperior and anteroinferior working portals.  A thorough diagnostic arthroscopy was undertaken, and the findings are noted above.  The biceps tendon was in good condition, and I did not think it warranted tenodesis.  An elevator was used to elevate the labrum anterior superiorly, anteriorly, and inferiorly.  I did remove the far inferior suture from the previous repair, so I could perform a more aggressive capsular repair and capsulorrhaphy.  A motorized resector was used to abrade the glenoid anteriorly, inferiorly and posteroinferiorly.  An accessory posterolateral portal was created.  A 25-degree suture lasso was used to coleman the inferior capsule as a separate bite and  "then the labrum.  A 1.8 mm anchor was placed just under the articular cartilage at the 6:30 position.  The repair stitch shuttled through the labrum and capsule and tensioned.  This brought the inferior labrum up onto the glenoid and tensioned the inferior capsule.  This was repeated with a second anchor at the 7:30 position.    Attention was now turned to the more anterior labrum.  A 25-degree suture lasso was used to coleman the anterior band in the inferior glenohumeral ligament, as well as the anteroinferior labrum at the 5:30 position.  An anchor was placed at the 5 o'clock position just under the articular cartilage, and the repair stitch shuttled through the labrum, bringing the labrum and capsule up onto the anteroinferior glenoid.  This was repeated with 3 additional anchors at the 4 o'clock,   3 o'clock and 2 o'clock positions.  These anchors achieved an excellent repair of the labrum.  The humeral head sat well-centered.  There was a robust reestablishment of the capsulolabral \"bumper.\"  The glenohumeral joint was lavaged and the arthroscopic instruments were removed.  Portal sites were closed with nylon suture.  A sterile dressing was applied, and the patient was placed in a shoulder immobilizer.  She was extubated on the operating room table and taken to the recovery room in good condition.  She tolerated the procedure well.  There were no complications.  Estimated blood loss was 5 mL.    DISPOSITION:  The patient will be discharged home through same-day surgery per protocol.  She may remove her dressing on postoperative day #2 and shower, redressing her incisions with Band-Aids, our gauze and Tubigrip.  She will wear a shoulder immobilizer for 6 weeks postop.  She will follow an arthroscopic Bankart rehab protocol.  The patient will start physical therapy on 01/09/2023 with [facility].  The patient will follow up on 01/12/2023 with our  for suture removal.  She will then follow up with " me at 6 weeks postop for routine recheck.    Emiliano Alejandre MD        D: 2023   T: 2023   MT: al    Name:     DAVID MORALES  MRN:      [id number]        Account:        [id number]   :      2000           Procedure Date: 2023     Document: [id number]

## 2023-01-04 NOTE — ANESTHESIA POSTPROCEDURE EVALUATION
Patient: Leonela Mclean    Procedure: Procedure(s):  RIGHT ARTHROSCOPY SHOULDER, WITH ARTHROSCOPIC BANKART REPAIR AND  CAPSULAR PLICATION.       Anesthesia Type:  No value filed.    Note:  Disposition: Outpatient   Postop Pain Control: Uneventful            Sign Out: Well controlled pain   PONV: No   Neuro/Psych: Uneventful            Sign Out: Acceptable/Baseline neuro status   Airway/Respiratory: Uneventful            Sign Out: Acceptable/Baseline resp. status   CV/Hemodynamics: Uneventful            Sign Out: Acceptable CV status   Other NRE: NONE   DID A NON-ROUTINE EVENT OCCUR? No           Last vitals:  Vitals Value Taken Time   BP 95/55 01/04/23 0930   Temp 36.7  C (98  F) 01/04/23 0930   Pulse     Resp 14 01/04/23 0930   SpO2 95 % 01/04/23 0930       Electronically Signed By: Renny Neal MD  January 4, 2023  2:17 PM

## 2023-01-04 NOTE — ANESTHESIA CARE TRANSFER NOTE
Patient: Leonela Mclean    Procedure: Procedure(s):  RIGHT ARTHROSCOPY SHOULDER, WITH ARTHROSCOPIC BANKART REPAIR AND  CAPSULAR PLICATION.       Diagnosis: Chronic right shoulder pain [M25.511, G89.29]  Diagnosis Additional Information: No value filed.    Anesthesia Type:   No value filed.     Note:    Oropharynx: spontaneously breathing and oropharynx clear of all foreign objects  Level of Consciousness: awake  Oxygen Supplementation: room air    Independent Airway: airway patency satisfactory and stable  Dentition: dentition unchanged  Vital Signs Stable: post-procedure vital signs reviewed and stable  Report to RN Given: handoff report given  Patient transferred to: PACU  Comments: Resps easy and regular. Report to PACU RN  Handoff Report: Identifed the Patient, Identified the Reponsible Provider, Reviewed the pertinent medical history, Discussed the surgical course, Reviewed Intra-OP anesthesia mangement and issues during anesthesia, Set expectations for post-procedure period and Allowed opportunity for questions and acknowledgement of understanding      Vitals:  Vitals Value Taken Time   /56 01/04/23 0916   Temp 36.6  C (97.9  F) 01/04/23 0916   Pulse     Resp 14 01/04/23 0916   SpO2 95 % 01/04/23 0916       Electronically Signed By: CAROLANN GAN CRNA  January 4, 2023  9:25 AM

## 2023-01-09 ENCOUNTER — THERAPY VISIT (OUTPATIENT)
Dept: PHYSICAL THERAPY | Facility: CLINIC | Age: 23
End: 2023-01-09
Attending: ORTHOPAEDIC SURGERY
Payer: COMMERCIAL

## 2023-01-09 DIAGNOSIS — M25.511 CHRONIC RIGHT SHOULDER PAIN: ICD-10-CM

## 2023-01-09 DIAGNOSIS — Z98.890 S/P SHOULDER SURGERY: ICD-10-CM

## 2023-01-09 DIAGNOSIS — S43.431D BANKART LESION OF RIGHT SHOULDER, SUBSEQUENT ENCOUNTER: ICD-10-CM

## 2023-01-09 DIAGNOSIS — G89.29 CHRONIC RIGHT SHOULDER PAIN: ICD-10-CM

## 2023-01-09 PROCEDURE — 97161 PT EVAL LOW COMPLEX 20 MIN: CPT | Mod: GP | Performed by: PHYSICAL THERAPIST

## 2023-01-09 PROCEDURE — 97110 THERAPEUTIC EXERCISES: CPT | Mod: GP | Performed by: PHYSICAL THERAPIST

## 2023-01-09 PROCEDURE — 97140 MANUAL THERAPY 1/> REGIONS: CPT | Mod: GP | Performed by: PHYSICAL THERAPIST

## 2023-01-09 NOTE — PROGRESS NOTES
Physical Therapy Initial Examination/Evaluation  January 9, 2023    Leonela Mclean is a 22 year old female referred to physical therapy by Emiliano Alejandre MD for treatment of     Diagnosis: R shoulder arthroscopic labral repair   Precautions/Restrictions/MD instructions/Other pertinent hx Arthroscopic Bankart protocol    Therapist Impression:   Leonela is presenting in satisfactory condition following the above procedure.  We will progress per MD protocol/instructions.    GOALS: working out    NEXT: STM, abduction wand, check incisions    PTRX: handouts    Subjective:  DOI/onset: NA DOS: 1/4/2022  Acute Injury or Gradual Onset?: Gradual injury over time  Mechanism of Injury: Sport; gymnastics and cheer  Related PMH: R shoulder  Previous Treatment: Surgery Effect of prior treatment: good  Imaging: x-ray and MRI  Chief Complaint/Functional Limitations:   Using a brace and see below in therapy evaluation codes   Pain: rest 0 /10, activity 8/10 Location: an Frequency: Intermittent Described as: aching Alleviated by: Ibuprofen Progression of Symptoms: Gradually getting better. Time of day when pain is worse: Night and Activity related  Sleeping: Interrupted due to current issue   Occupation: Student (goal of PA)  Job duties: keyboarding/computer use  Current HEP/exercise regimen: working out  Patient's goals are see chief complaints     Other pertinent PMH/Red Flags: None   Barriers at home/work: None as reported by patient  Pertinent Surgical History: R shoulder posterior Bankart repair  Medications: Anti-inflammatory  General health as reported by patient: excellent  Return to MD:  Prn    POST-OPERATIVE SHOULDER EVALUATION      STATIC POSTURE  Forward head: none/WNL   Rounded shoulders:mild  Shoulder internally rotated: mild   Visual inspection: NA    RANGE OF MOTION  PROM Flexion Abd ER 90-90 IR Scap Stabilized Horizontal Adduction   Left na na    Base: na  45: na  90-90: na 45: na  90-90: na na   Right 65 na Base:  10  45: na  90-90: na 45: na  90-90: na na     Incision observation: NA    Palpation  Left: Not assessed  Right: Not assessed    Assessment/Plan:  Patient is a 22 year old female with right side shoulder complaints.    Patient has the following significant findings with corresponding treatment plan.                Diagnosis 1:  R shoulder arthroscopic labral repair   Pain -  hot/cold therapy, manual therapy, splint/taping/bracing/orthotics, self management, education and home program  Decreased ROM/flexibility - manual therapy and therapeutic exercise  Decreased joint mobility - manual therapy and therapeutic exercise  Decreased strength - therapeutic exercise and therapeutic activities  Impaired muscle performance - neuro re-education  Impaired posture - neuro re-education  Instability -  Therapeutic Activity  Therapeutic Exercise    Therapy Evaluation Codes:   Cumulative Therapy Evaluation is: Low complexity.    Previous and current functional limitations:  (See Goal Flow Sheet for this information)    Short term and Long term goals: (See Goal Flow Sheet for this information)     Communication ability:  Patient appears to be able to clearly communicate and understand verbal and written communication and follow directions correctly.  Treatment Explanation - The following has been discussed with the patient:   RX ordered/plan of care  Anticipated outcomes  Possible risks and side effects  This patient would benefit from PT intervention to resume normal activities.   Rehab potential is good.    Frequency:  1 X week, once daily  Duration:  for 16 weeks  Discharge Plan:  Achieve all LTG.  Independent in home treatment program.  Reach maximal therapeutic benefit.    Please refer to the daily flowsheet for treatment today, total treatment time and time spent performing 1:1 timed codes.

## 2023-01-12 ENCOUNTER — OFFICE VISIT (OUTPATIENT)
Dept: ORTHOPEDICS | Facility: CLINIC | Age: 23
End: 2023-01-12
Payer: COMMERCIAL

## 2023-01-12 ENCOUNTER — THERAPY VISIT (OUTPATIENT)
Dept: PHYSICAL THERAPY | Facility: CLINIC | Age: 23
End: 2023-01-12
Payer: COMMERCIAL

## 2023-01-12 VITALS — WEIGHT: 120 LBS | HEIGHT: 63 IN | BODY MASS INDEX: 21.26 KG/M2

## 2023-01-12 DIAGNOSIS — Z51.89 VISIT FOR WOUND CARE: Primary | ICD-10-CM

## 2023-01-12 DIAGNOSIS — G89.29 CHRONIC RIGHT SHOULDER PAIN: ICD-10-CM

## 2023-01-12 DIAGNOSIS — S43.431D BANKART LESION OF RIGHT SHOULDER, SUBSEQUENT ENCOUNTER: Primary | ICD-10-CM

## 2023-01-12 DIAGNOSIS — Z98.890 S/P SHOULDER SURGERY: ICD-10-CM

## 2023-01-12 DIAGNOSIS — M25.511 CHRONIC RIGHT SHOULDER PAIN: ICD-10-CM

## 2023-01-12 PROCEDURE — 99024 POSTOP FOLLOW-UP VISIT: CPT

## 2023-01-12 PROCEDURE — 97530 THERAPEUTIC ACTIVITIES: CPT | Mod: GP | Performed by: PHYSICAL THERAPIST

## 2023-01-12 PROCEDURE — 97110 THERAPEUTIC EXERCISES: CPT | Mod: GP | Performed by: PHYSICAL THERAPIST

## 2023-01-12 NOTE — PROGRESS NOTES
CHIEF COMPLAINT: Postoperative visit, Right shoulder arthroscopic labral repair with capsulorrhaphy    DATE OF SURGERY: 1/4/2023    HISTORY OF PRESENT ILLNESS: Leonela Mclean is a 22 year old female who presents today, now 8 days status post right shoulder surgery. Doing well. No fever, chills or night sweats. No calf pain.     PHYSICAL EXAMINATION:   GENERAL: Leonela Mclean is a 22 year old female, alert and oriented, in no apparent distress.   Right Upper Extremity: Evaluation of the right upper extremity reveals healing surgical incisions without erythema, induration or drainage. The knee is not warm or erythematous. No calf tenderness.      IMPRESSION: 8 days status post right shoulder. Patient is doing well. No evidence of infection or DVT.      PLAN:   1. The patient's sutures were removed and wound dressings were changed.   2. The patient will continue to wear a shoulder immobilizer for six weeks. .  3. The patient will follow up with Dr. Alejandre in 6 weeks for routine follow up.    Radha Zhong, ATC

## 2023-01-18 ENCOUNTER — THERAPY VISIT (OUTPATIENT)
Dept: PHYSICAL THERAPY | Facility: CLINIC | Age: 23
End: 2023-01-18
Payer: COMMERCIAL

## 2023-01-18 DIAGNOSIS — Z98.890 S/P SHOULDER SURGERY: ICD-10-CM

## 2023-01-18 DIAGNOSIS — G89.29 CHRONIC RIGHT SHOULDER PAIN: ICD-10-CM

## 2023-01-18 DIAGNOSIS — M25.511 CHRONIC RIGHT SHOULDER PAIN: ICD-10-CM

## 2023-01-18 DIAGNOSIS — S43.431D BANKART LESION OF RIGHT SHOULDER, SUBSEQUENT ENCOUNTER: Primary | ICD-10-CM

## 2023-01-18 PROCEDURE — 97530 THERAPEUTIC ACTIVITIES: CPT | Mod: GP | Performed by: PHYSICAL THERAPIST

## 2023-01-18 PROCEDURE — 97140 MANUAL THERAPY 1/> REGIONS: CPT | Mod: GP | Performed by: PHYSICAL THERAPIST

## 2023-01-18 PROCEDURE — 97110 THERAPEUTIC EXERCISES: CPT | Mod: GP | Performed by: PHYSICAL THERAPIST

## 2023-01-18 NOTE — PROGRESS NOTES
Diagnosis: R shoulder arthroscopic labral repair   Precautions/Restrictions/MD instructions/Other pertinent hx Arthroscopic Bankart protocol    Therapist Impression:   Doing well.  Decrease frequency to 1 x week.    GOALS: working out    NEXT: STM    Subjective:  Denies pain.  Some nighttime pain.  Sleeping is so-so.      Objective:  Small pressure blister.    RANGE OF MOTION  PROM Flexion Abd ER 90-90 IR Scap Stabilized Horizontal Adduction   Left  na    Base: na  45: na  90-90: na 45: na  90-90: na na   Right 75 na Base: 15  45: na  90-90: na 45: na  90-90: na na

## 2023-01-25 ENCOUNTER — THERAPY VISIT (OUTPATIENT)
Dept: PHYSICAL THERAPY | Facility: CLINIC | Age: 23
End: 2023-01-25
Payer: COMMERCIAL

## 2023-01-25 DIAGNOSIS — G89.29 CHRONIC RIGHT SHOULDER PAIN: ICD-10-CM

## 2023-01-25 DIAGNOSIS — M25.511 CHRONIC RIGHT SHOULDER PAIN: ICD-10-CM

## 2023-01-25 DIAGNOSIS — S43.431D BANKART LESION OF RIGHT SHOULDER, SUBSEQUENT ENCOUNTER: Primary | ICD-10-CM

## 2023-01-25 DIAGNOSIS — Z98.890 S/P SHOULDER SURGERY: ICD-10-CM

## 2023-01-25 PROCEDURE — 97110 THERAPEUTIC EXERCISES: CPT | Mod: GP | Performed by: PHYSICAL THERAPIST

## 2023-01-25 PROCEDURE — 97140 MANUAL THERAPY 1/> REGIONS: CPT | Mod: GP | Performed by: PHYSICAL THERAPIST

## 2023-01-25 PROCEDURE — 97530 THERAPEUTIC ACTIVITIES: CPT | Mod: GP | Performed by: PHYSICAL THERAPIST

## 2023-01-25 NOTE — PROGRESS NOTES
Diagnosis: R shoulder arthroscopic labral repair   Precautions/Restrictions/MD instructions/Other pertinent hx Arthroscopic Bankart protocol    Therapist Impression:   Having pinching with ER.  Responded well to STM.  Trial longer holds to 10 seconds due to lack of ROM progress    GOALS: working out    NEXT: STM    Subjective:  Denies pain.  Some nighttime pain.  Sleeping is so-so.      Objective:  Small pressure blister.    RANGE OF MOTION  PROM Flexion Abd ER 90-90 IR Scap Stabilized Horizontal Adduction   Left  na    Base: na  45: na  90-90: na 45: na  90-90: na na   Right 83 na Base: 5 pre 22 post  45: na  90-90: na 45: na  90-90: na na

## 2023-02-01 ENCOUNTER — THERAPY VISIT (OUTPATIENT)
Dept: PHYSICAL THERAPY | Facility: CLINIC | Age: 23
End: 2023-02-01
Payer: COMMERCIAL

## 2023-02-01 DIAGNOSIS — M25.511 CHRONIC RIGHT SHOULDER PAIN: ICD-10-CM

## 2023-02-01 DIAGNOSIS — Z98.890 S/P SHOULDER SURGERY: ICD-10-CM

## 2023-02-01 DIAGNOSIS — S43.431D BANKART LESION OF RIGHT SHOULDER, SUBSEQUENT ENCOUNTER: Primary | ICD-10-CM

## 2023-02-01 DIAGNOSIS — G89.29 CHRONIC RIGHT SHOULDER PAIN: ICD-10-CM

## 2023-02-01 PROCEDURE — 97530 THERAPEUTIC ACTIVITIES: CPT | Mod: GP | Performed by: PHYSICAL THERAPIST

## 2023-02-01 PROCEDURE — 97110 THERAPEUTIC EXERCISES: CPT | Mod: GP | Performed by: PHYSICAL THERAPIST

## 2023-02-01 PROCEDURE — 97140 MANUAL THERAPY 1/> REGIONS: CPT | Mod: GP | Performed by: PHYSICAL THERAPIST

## 2023-02-01 NOTE — PROGRESS NOTES
Diagnosis: R shoulder arthroscopic labral repair   Precautions/Restrictions/MD instructions/Other pertinent hx Arthroscopic Bankart protocol    Therapist Impression:   Good response to longer holds for ROM.  Progressed to 100 deg flexion and isometrics as well.    GOALS: working out    NEXT: [facility]    Subjective:  Trupti is doing better.  Pain is well managed    Objective:       RANGE OF MOTION  PROM Flexion Abd ER 90-90 IR Scap Stabilized Horizontal Adduction   Left  na    Base: na  45: na  90-90: na 45: na  90-90: na na   Right 105 na Base: 25  45: na  90-90: na 45: na  90-90: na na

## 2023-02-17 ENCOUNTER — OFFICE VISIT (OUTPATIENT)
Dept: ORTHOPEDICS | Facility: CLINIC | Age: 23
End: 2023-02-17
Payer: COMMERCIAL

## 2023-02-17 VITALS — WEIGHT: 120 LBS | BODY MASS INDEX: 21.26 KG/M2 | HEIGHT: 63 IN

## 2023-02-17 DIAGNOSIS — Z98.890 S/P SHOULDER SURGERY: Primary | ICD-10-CM

## 2023-02-17 PROCEDURE — 99024 POSTOP FOLLOW-UP VISIT: CPT | Performed by: ORTHOPAEDIC SURGERY

## 2023-02-17 NOTE — LETTER
2/17/2023         RE: Leonela Mclean  [id number] [facility] [address]        Dear Colleague,    Thank you for referring your patient, Leonela Mclean, to the [facility] [place]. Please see a copy of my visit note below.    Chief Complaint:  Postoperative visit, right shoulder    Date of Surgery:  01/04/2023    History of Present Illness:  Doris is a 22-year-old female who is now 6 weeks status post right shoulder arthroscopic labral repair with capsulorrhaphy.  Doing quite well.  He has no pain.  She has no numbness or tingling.  She is doing her physical therapy with [facility] and this is going well.    Physical Examination:  22-year-old female alert oriented no apparent distress.  Portal sites well-healed.  Neurovascularly intact.  Forward flexion to 130 degrees.  External rotation to 20 degrees.    Impression:  6 weeks status post right shoulder arthroscopic labral repair and capsulorrhaphy.  Doing well.  She is neurovascularly intact.  There is no evidence of infection.    Plan:  1. She will continue her rehabilitation.  We can gradually work on capsular stretching.  I doubt Leonela will have trouble regaining motion.  2. She can start a light jogging program at 8 weeks postop  3. Number neck she will follow-up with me in 8 weeks for routine recheck.        Emiliano Alejandre MD   Results discussed during visit.

## 2023-02-18 NOTE — PROGRESS NOTES
Chief Complaint:  Postoperative visit, right shoulder    Date of Surgery:  01/04/2023    History of Present Illness:  Doris is a 22-year-old female who is now 6 weeks status post right shoulder arthroscopic labral repair with capsulorrhaphy.  Doing quite well.  He has no pain.  She has no numbness or tingling.  She is doing her physical therapy with [facility] and this is going well.    Physical Examination:  22-year-old female alert oriented no apparent distress.  Portal sites well-healed.  Neurovascularly intact.  Forward flexion to 130 degrees.  External rotation to 20 degrees.    Impression:  6 weeks status post right shoulder arthroscopic labral repair and capsulorrhaphy.  Doing well.  She is neurovascularly intact.  There is no evidence of infection.    Plan:  1. She will continue her rehabilitation.  We can gradually work on capsular stretching.  I doubt Leonela will have trouble regaining motion.  2. She can start a light jogging program at 8 weeks postop  3. Number neck she will follow-up with me in 8 weeks for routine recheck.

## 2023-02-20 ENCOUNTER — THERAPY VISIT (OUTPATIENT)
Dept: PHYSICAL THERAPY | Facility: CLINIC | Age: 23
End: 2023-02-20
Payer: COMMERCIAL

## 2023-02-20 DIAGNOSIS — G89.29 CHRONIC RIGHT SHOULDER PAIN: ICD-10-CM

## 2023-02-20 DIAGNOSIS — Z98.890 S/P SHOULDER SURGERY: ICD-10-CM

## 2023-02-20 DIAGNOSIS — S43.431D BANKART LESION OF RIGHT SHOULDER, SUBSEQUENT ENCOUNTER: Primary | ICD-10-CM

## 2023-02-20 DIAGNOSIS — M25.511 CHRONIC RIGHT SHOULDER PAIN: ICD-10-CM

## 2023-02-20 PROCEDURE — 97140 MANUAL THERAPY 1/> REGIONS: CPT | Mod: GP | Performed by: PHYSICAL THERAPIST

## 2023-02-20 PROCEDURE — 97110 THERAPEUTIC EXERCISES: CPT | Mod: GP | Performed by: PHYSICAL THERAPIST

## 2023-02-20 PROCEDURE — 97530 THERAPEUTIC ACTIVITIES: CPT | Mod: GP | Performed by: PHYSICAL THERAPIST

## 2023-02-20 NOTE — PROGRESS NOTES
Diagnosis: R shoulder arthroscopic labral repair   Precautions/Restrictions/MD instructions/Other pertinent hx Arthroscopic Bankart protocol    Therapist Impression:   Good response to longer holds for ROM.  Progressed to 100 deg flexion and isometrics as well.    GOALS: working out    NEXT: STM    Subjective:  Some pain when sleeping now that she is out of brace.  Using arm a bit more.      Objective:       RANGE OF MOTION  PROM Flexion Abd ER 90-90 IR Scap Stabilized Horizontal Adduction   Left  na    Base: na  45: na  90-90: na 45: na  90-90: na na   Right 120 na Base: 25 / 35  45: na  90-90: na 45: na  90-90: na na

## 2023-02-22 ENCOUNTER — MYC MEDICAL ADVICE (OUTPATIENT)
Dept: PHYSICAL THERAPY | Facility: CLINIC | Age: 23
End: 2023-02-22
Payer: COMMERCIAL

## 2023-03-01 ENCOUNTER — THERAPY VISIT (OUTPATIENT)
Dept: PHYSICAL THERAPY | Facility: CLINIC | Age: 23
End: 2023-03-01
Payer: COMMERCIAL

## 2023-03-01 DIAGNOSIS — Z98.890 S/P SHOULDER SURGERY: ICD-10-CM

## 2023-03-01 DIAGNOSIS — G89.29 CHRONIC RIGHT SHOULDER PAIN: ICD-10-CM

## 2023-03-01 DIAGNOSIS — S43.431D BANKART LESION OF RIGHT SHOULDER, SUBSEQUENT ENCOUNTER: Primary | ICD-10-CM

## 2023-03-01 DIAGNOSIS — M25.511 CHRONIC RIGHT SHOULDER PAIN: ICD-10-CM

## 2023-03-01 PROCEDURE — 97530 THERAPEUTIC ACTIVITIES: CPT | Mod: GP | Performed by: PHYSICAL THERAPIST

## 2023-03-01 PROCEDURE — 97110 THERAPEUTIC EXERCISES: CPT | Mod: GP | Performed by: PHYSICAL THERAPIST

## 2023-03-01 NOTE — PROGRESS NOTES
Diagnosis: R shoulder arthroscopic labral repair   Precautions/Restrictions/MD instructions/Other pertinent hx Arthroscopic Bankart protocol    Therapist Impression:   Progressed to unrestricted stretching as tolerated with the goal or near full ROM by 4 months.  We also progressed strengthening exercises as well.    GOALS: working out    NEXT: STM as needed, 90-90 ER in future, scaption, push up plus, wall ball    PTRX: handouts    Subjective:  Some pain when sleeping now that she is out of brace.  Using arm a bit more.      Objective:       RANGE OF MOTION  SHOULDER RANGE OF MOTION  AROM Flexion Abduction ER   Base Ext/IR   Left 160 160  Ant Drift: none 70 na   Right 130 95  Ant Drift: none 30 sacrum    Pain: none        PROM Flexion Abd ER 90-90 IR Scap Stabilized Horizontal Adduction   Left 180 na    Base: 88  45: na  90-90: 95 45: na  90-90: na na   Right 135 na Base: 40 (goal 75)  45: na  90-90: na 45: na  90-90: na na

## 2023-03-15 ENCOUNTER — THERAPY VISIT (OUTPATIENT)
Dept: PHYSICAL THERAPY | Facility: CLINIC | Age: 23
End: 2023-03-15
Payer: COMMERCIAL

## 2023-03-15 DIAGNOSIS — G89.29 CHRONIC RIGHT SHOULDER PAIN: ICD-10-CM

## 2023-03-15 DIAGNOSIS — M25.511 CHRONIC RIGHT SHOULDER PAIN: ICD-10-CM

## 2023-03-15 DIAGNOSIS — Z98.890 S/P SHOULDER SURGERY: ICD-10-CM

## 2023-03-15 DIAGNOSIS — S43.431D BANKART LESION OF RIGHT SHOULDER, SUBSEQUENT ENCOUNTER: Primary | ICD-10-CM

## 2023-03-15 PROCEDURE — 97140 MANUAL THERAPY 1/> REGIONS: CPT | Mod: GP | Performed by: PHYSICAL THERAPIST

## 2023-03-15 PROCEDURE — 97110 THERAPEUTIC EXERCISES: CPT | Mod: GP | Performed by: PHYSICAL THERAPIST

## 2023-03-15 PROCEDURE — 97530 THERAPEUTIC ACTIVITIES: CPT | Mod: GP | Performed by: PHYSICAL THERAPIST

## 2023-03-15 NOTE — PROGRESS NOTES
Diagnosis: R shoulder arthroscopic labral repair   Precautions/Restrictions/MD instructions/Other pertinent hx Arthroscopic Bankart protocol    Therapist Impression:   Progressed to unrestricted stretching as tolerated with the goal or near full ROM by 4 months.  Progress to 1lb weights as tolerated for some exercises.    GOALS: working out    NEXT: STM as needed, 90-90 ER in future, consider IR stretching, wall crawls?    PTRX: handouts    Subjective:  ROM is improving.   Sleeping is better.            Objective:  RANGE OF MOTION  SHOULDER RANGE OF MOTION  AROM Flexion Abduction ER   Base Ext/IR   Left 160 160  Ant Drift: none 70 na   Right 145 120  Ant Drift: none 40 L4   Pain: none        PROM Flexion Abd ER 90-90 IR Scap Stabilized Horizontal Adduction   Left 180 na    Base: 88  45: na  90-90: 95 45: na  90-90: 60 na   Right 145 na Base: 60 (goal 75)  45: na  90-90: 60 45: na  90-90: 25 na

## 2023-03-20 ENCOUNTER — TELEPHONE (OUTPATIENT)
Dept: ORTHOPEDICS | Facility: CLINIC | Age: 23
End: 2023-03-20
Payer: COMMERCIAL

## 2023-03-20 NOTE — TELEPHONE ENCOUNTER
[facility] Call Center    Phone Message:      Pt needs a POST OP Follow Up appt, but her schedule does not fit into the available dates of MD Hill's schedule.     Pt would like a CALL BACK to discuss. Please CALL pt. Thank you.    Reason for Call: Other: POST OP Follow Up Date Conflict     Action Taken: Message routed to:  [facility] ([facility]): Team    Travel Screening: Not Applicable

## 2023-03-21 NOTE — TELEPHONE ENCOUNTER
ATC spoke with patient who confirmed she was able to reschedule her appointment with Dr. Alejandre on 4/14/2023 to 4/21/2023 due to a schedule conflict.     Patient appreciated the return call.    SHI Garcia

## 2023-03-29 ENCOUNTER — THERAPY VISIT (OUTPATIENT)
Dept: PHYSICAL THERAPY | Facility: CLINIC | Age: 23
End: 2023-03-29
Payer: COMMERCIAL

## 2023-03-29 DIAGNOSIS — G89.29 CHRONIC RIGHT SHOULDER PAIN: ICD-10-CM

## 2023-03-29 DIAGNOSIS — S43.431D BANKART LESION OF RIGHT SHOULDER, SUBSEQUENT ENCOUNTER: Primary | ICD-10-CM

## 2023-03-29 DIAGNOSIS — M25.511 CHRONIC RIGHT SHOULDER PAIN: ICD-10-CM

## 2023-03-29 DIAGNOSIS — Z98.890 S/P SHOULDER SURGERY: ICD-10-CM

## 2023-03-29 PROCEDURE — 97110 THERAPEUTIC EXERCISES: CPT | Mod: GP | Performed by: PHYSICAL THERAPIST

## 2023-03-29 PROCEDURE — 97140 MANUAL THERAPY 1/> REGIONS: CPT | Mod: GP | Performed by: PHYSICAL THERAPIST

## 2023-03-29 PROCEDURE — 97530 THERAPEUTIC ACTIVITIES: CPT | Mod: GP | Performed by: PHYSICAL THERAPIST

## 2023-03-29 NOTE — PROGRESS NOTES
Diagnosis: R shoulder arthroscopic labral repair   Precautions/Restrictions/MD instructions/Other pertinent hx Arthroscopic Bankart protocol    Therapist Impression:   Initiated gentle rhythmic stabs with OJ shakes.  Continue to focus on ER stretching into 90-90 ER.  Increase hold times to 30 seconds x 4.     GOALS: working out    NEXT: STM as needed, 90-90 ER in future, Y motion, IR stretching in future    PTRX: handouts    Subjective:  Some issues with abduction and some reaching positions.  Sleeping is going pretty good.    Objective:  RANGE OF MOTION  SHOULDER RANGE OF MOTION  AROM Flexion Abduction ER   Base Ext/IR   Left 160 160  Ant Drift: none 70 na   Right 150 150  Ant Drift: none 50 T10   Pain: none        PROM Flexion Abd ER 90-90 IR Scap Stabilized Horizontal Adduction   Left 180 na    Base: 88  45: na  90-90: 95 45: na  90-90: 60 na   Right 145 na Base: 60 (goal 75)  45: na  90-90: 40 60 post PT 45: na  90-90: 25 na

## 2023-04-13 ENCOUNTER — THERAPY VISIT (OUTPATIENT)
Dept: PHYSICAL THERAPY | Facility: CLINIC | Age: 23
End: 2023-04-13
Payer: COMMERCIAL

## 2023-04-13 DIAGNOSIS — M25.511 CHRONIC RIGHT SHOULDER PAIN: ICD-10-CM

## 2023-04-13 DIAGNOSIS — S43.431D BANKART LESION OF RIGHT SHOULDER, SUBSEQUENT ENCOUNTER: Primary | ICD-10-CM

## 2023-04-13 DIAGNOSIS — G89.29 CHRONIC RIGHT SHOULDER PAIN: ICD-10-CM

## 2023-04-13 DIAGNOSIS — Z98.890 S/P SHOULDER SURGERY: ICD-10-CM

## 2023-04-13 PROCEDURE — 97110 THERAPEUTIC EXERCISES: CPT | Mod: GP | Performed by: PHYSICAL THERAPIST

## 2023-04-13 PROCEDURE — 97530 THERAPEUTIC ACTIVITIES: CPT | Mod: GP | Performed by: PHYSICAL THERAPIST

## 2023-04-13 PROCEDURE — 97140 MANUAL THERAPY 1/> REGIONS: CPT | Mod: GP | Performed by: PHYSICAL THERAPIST

## 2023-04-13 NOTE — PROGRESS NOTES
PROGRESS REPORT    Leonela has been in therapy from January 9, 2023 to Apr 13, 2023 for treatment of:    Diagnosis: R shoulder arthroscopic labral repair   Precautions/Restrictions/MD instructions/Other pertinent hx Arthroscopic Bankart protocol    Therapist Impression:   Overall, doing well.  Not much progress in 90-90 ER.  Initiated capsular stretching with sleeper stretch today which helped improve 90-90 ER ROM.  Hold on OH strengthening until ROM is improved.     GOALS: working out    NEXT: STM as needed,, Y motion in future    PTRX: handouts    Subjective:  Doing well.      Objective:  RANGE OF MOTION  SHOULDER RANGE OF MOTION  AROM Flexion Abduction ER   Base Ext/IR   Left 160 160  Ant Drift: none 70 na   Right 150 150  Ant Drift: none 50 T10   Pain: none        PROM Flexion Abd ER 90-90 IR Scap Stabilized Horizontal Adduction   Left 180 na    Base: 88  45: na  90-90: 95 45: na  90-90: 60 na   Right 145 na Base: 70  45: na  90-90: 58 70 post PT 45: na  90-90: 25 na     ASSESSMENT/PLAN  Updated problem list and treatment plan: The primary encounter diagnosis was Bankart lesion of right shoulder, subsequent encounter. Diagnoses of Chronic right shoulder pain and S/P shoulder surgery were also pertinent to this visit. Pain - HEP  Decreased ROM/flexibility - HEP  Decreased function - HEP  Decreased strength - HEP  STG/LTGs have been met or progress has been made towards goals:  None  Assessment of Progress: The patient's condition is improving.  Self Management Plans:  Patient has been instructed in a home treatment program.  Patient  has been instructed in self management of symptoms.  I have re-evaluated this patient and find that the nature, scope, duration and intensity of the therapy is appropriate for the medical condition of the patient.  Leonela continues to require the following intervention to meet STG and LTG's:  PT    Recommendations:  This patient would benefit from continued therapy.     Frequency:  2  x month, once daily  Duration:  for 2 months              Please refer to the daily flowsheet for treatment today, total treatment time and time spent performing 1:1 timed codes.

## 2023-04-21 ENCOUNTER — OFFICE VISIT (OUTPATIENT)
Dept: ORTHOPEDICS | Facility: CLINIC | Age: 23
End: 2023-04-21
Payer: COMMERCIAL

## 2023-04-21 VITALS — WEIGHT: 120 LBS | BODY MASS INDEX: 21.26 KG/M2 | HEIGHT: 63 IN

## 2023-04-21 DIAGNOSIS — Z98.890 S/P SHOULDER SURGERY: Primary | ICD-10-CM

## 2023-04-21 PROCEDURE — 99212 OFFICE O/P EST SF 10 MIN: CPT | Performed by: ORTHOPAEDIC SURGERY

## 2023-04-21 NOTE — LETTER
4/21/2023         RE: Leonela Mclean  [id number] [facility] [address]        Dear Colleague,    Thank you for referring your patient, Leonela Mclean, to the [facility] [place]. Please see a copy of my visit note below.    Chief Complaint:  Postoperative visit, right shoulder    Date of Surgery:  01/04/2023    History of Present Illness:  Leonela is a 23-year-old female who is now 4 months status post right arthroscopic labral repair with capsulorrhaphy.  She is doing well.  Her shoulder still feels stiff.  Is getting a bit better.  She is doing her physical therapy with [facility].    Physical Examination:  Right shoulder range of motion reveals 170/40/85/15.  This compares to 180/45/90/40 on the left shoulder.  Rotator cuff strength is 5/5 on her right shoulder.  She has no apprehension.    Impression:  4 months status post right shoulder labral repair and capsulorrhaphy.  And is doing well.  As expected she is a bit tight.  We want her to continue to work on a capsular stretching program.  I told her that she can start to run which will help.  She could use an elliptical .  I am fine with her advancing her cuff and periscapular strengthening but her primary focus should be range of motion.    Plan:  Continue a capsular stretching program.  Be a bit more aggressive.  Work on the sleeper stretch.  Continue to work with [facility].  Follow-up with me in 3 months for routine recheck        Again, thank you for allowing me to participate in the care of your patient.        Sincerely,        Emiliano Alejandre MD

## 2023-04-23 NOTE — PROGRESS NOTES
Chief Complaint:  Postoperative visit, right shoulder    Date of Surgery:  01/04/2023    History of Present Illness:  Leonela is a 23-year-old female who is now 4 months status post right arthroscopic labral repair with capsulorrhaphy.  She is doing well.  Her shoulder still feels stiff.  Is getting a bit better.  She is doing her physical therapy with [facility].    Physical Examination:  Right shoulder range of motion reveals 170/40/85/15.  This compares to 180/45/90/40 on the left shoulder.  Rotator cuff strength is 5/5 on her right shoulder.  She has no apprehension.    Impression:  4 months status post right shoulder labral repair and capsulorrhaphy.  And is doing well.  As expected she is a bit tight.  We want her to continue to work on a capsular stretching program.  I told her that she can start to run which will help.  She could use an elliptical .  I am fine with her advancing her cuff and periscapular strengthening but her primary focus should be range of motion.    Plan:  1. Continue a capsular stretching program.  Be a bit more aggressive.  Work on the sleeper stretch.  2. Continue to work with [facility].  3. Follow-up with me in 3 months for routine recheck

## 2023-04-24 ENCOUNTER — THERAPY VISIT (OUTPATIENT)
Dept: PHYSICAL THERAPY | Facility: CLINIC | Age: 23
End: 2023-04-24
Payer: COMMERCIAL

## 2023-04-24 DIAGNOSIS — G89.29 CHRONIC RIGHT SHOULDER PAIN: ICD-10-CM

## 2023-04-24 DIAGNOSIS — Z98.890 S/P SHOULDER SURGERY: ICD-10-CM

## 2023-04-24 DIAGNOSIS — M25.511 CHRONIC RIGHT SHOULDER PAIN: ICD-10-CM

## 2023-04-24 DIAGNOSIS — S43.431D BANKART LESION OF RIGHT SHOULDER, SUBSEQUENT ENCOUNTER: Primary | ICD-10-CM

## 2023-04-24 PROCEDURE — 97110 THERAPEUTIC EXERCISES: CPT | Mod: GP | Performed by: PHYSICAL THERAPIST

## 2023-04-24 PROCEDURE — 97140 MANUAL THERAPY 1/> REGIONS: CPT | Mod: GP | Performed by: PHYSICAL THERAPIST

## 2023-04-24 PROCEDURE — 97530 THERAPEUTIC ACTIVITIES: CPT | Mod: GP | Performed by: PHYSICAL THERAPIST

## 2023-04-24 NOTE — PROGRESS NOTES
Diagnosis: R shoulder arthroscopic labral repair   Precautions/Restrictions/MD instructions/Other pertinent hx Arthroscopic Bankart protocol    Therapist Impression:   Initiated posterior joint mobs due to lack of progress in ROM despite trying stretching, TPR, self-stretching.  Good response from 60-72 deg 90-90 ER.  Responded well to cross body adduction stretch as well.     GOALS: working out    NEXT: STM as needed,, Y motion in future    PTRX: handouts    Subjective:  Doing well.  Some tightness.    Objective:  RANGE OF MOTION  SHOULDER RANGE OF MOTION  AROM Flexion Abduction ER   Base Ext/IR   Left 160 160  Ant Drift: none 70 na   Right 150 150  Ant Drift: none 55 T9   Pain: none        PROM Flexion Abd ER 90-90 IR Scap Stabilized Horizontal Adduction   Left 180 na    Base: 88  45: na  90-90: 95 45: na  90-90: 60 na   Right 145 na Base: 70  45: na  90-90: 58 72 post PT 45: na  90-90: 25 na

## 2023-05-08 ENCOUNTER — THERAPY VISIT (OUTPATIENT)
Dept: PHYSICAL THERAPY | Facility: CLINIC | Age: 23
End: 2023-05-08
Payer: COMMERCIAL

## 2023-05-08 DIAGNOSIS — S43.431D BANKART LESION OF RIGHT SHOULDER, SUBSEQUENT ENCOUNTER: Primary | ICD-10-CM

## 2023-05-08 DIAGNOSIS — Z98.890 S/P SHOULDER SURGERY: ICD-10-CM

## 2023-05-08 DIAGNOSIS — M25.511 CHRONIC RIGHT SHOULDER PAIN: ICD-10-CM

## 2023-05-08 DIAGNOSIS — G89.29 CHRONIC RIGHT SHOULDER PAIN: ICD-10-CM

## 2023-05-08 PROCEDURE — 97140 MANUAL THERAPY 1/> REGIONS: CPT | Mod: GP | Performed by: PHYSICAL THERAPIST

## 2023-05-08 PROCEDURE — 97530 THERAPEUTIC ACTIVITIES: CPT | Mod: GP | Performed by: PHYSICAL THERAPIST

## 2023-05-08 PROCEDURE — 97110 THERAPEUTIC EXERCISES: CPT | Mod: GP | Performed by: PHYSICAL THERAPIST

## 2023-05-08 NOTE — PROGRESS NOTES
Diagnosis: R shoulder arthroscopic labral repair   Precautions/Restrictions/MD instructions/Other pertinent hx Arthroscopic Bankart protocol    Therapist Impression:   Good progress in ROM.  Still tight and still pinching, but good slow progress with capsular ROM.  Continue joint mobs and stretching.  Added Y motion and progress WBing exercises in future     GOALS: working out    NEXT: STM as needed,    PTRX: handouts    Subjective:  No real changes.    Objective:  RANGE OF MOTION  SHOULDER RANGE OF MOTION  AROM Flexion Abduction ER   Base Ext/IR   Left 160 160  Ant Drift: none 70 na   Right 150 150  Ant Drift: none 55 T7   Pain: none        PROM Flexion Abd ER 90-90 IR Scap Stabilized Horizontal Adduction   Left 180 na    Base: 88  45: na  90-90: 95 45: na  90-90: 60 na   Right 145 na Base: 72  45: na  90-90: 72 / 75 post PT 45: na  90-90: 30 / 35 post na

## 2023-05-22 ENCOUNTER — THERAPY VISIT (OUTPATIENT)
Dept: PHYSICAL THERAPY | Facility: CLINIC | Age: 23
End: 2023-05-22
Payer: COMMERCIAL

## 2023-05-22 DIAGNOSIS — G89.29 CHRONIC RIGHT SHOULDER PAIN: ICD-10-CM

## 2023-05-22 DIAGNOSIS — S43.431D BANKART LESION OF RIGHT SHOULDER, SUBSEQUENT ENCOUNTER: Primary | ICD-10-CM

## 2023-05-22 DIAGNOSIS — Z98.890 S/P SHOULDER SURGERY: ICD-10-CM

## 2023-05-22 DIAGNOSIS — M25.511 CHRONIC RIGHT SHOULDER PAIN: ICD-10-CM

## 2023-05-22 PROCEDURE — 97530 THERAPEUTIC ACTIVITIES: CPT | Mod: GP | Performed by: PHYSICAL THERAPIST

## 2023-05-22 PROCEDURE — 97140 MANUAL THERAPY 1/> REGIONS: CPT | Mod: 59 | Performed by: PHYSICAL THERAPIST

## 2023-05-22 PROCEDURE — 97110 THERAPEUTIC EXERCISES: CPT | Mod: 59 | Performed by: PHYSICAL THERAPIST

## 2023-05-22 NOTE — PROGRESS NOTES
Diagnosis: R shoulder arthroscopic labral repair   Precautions/Restrictions/MD instructions/Other pertinent hx Arthroscopic Bankart protocol    Therapist Impression:   No change in ROM after joint mobs.  Continue end range stretching, capsular stretching.  Progressed to wall 90-90 ER.  Within 15 deg for both 90-90 ER and IR ROM.     GOALS: working out    NEXT: STM as needed, retry ball Y and W    PTRX: handouts    Subjective:  No issues day to day.    Objective:  RANGE OF MOTION  SHOULDER RANGE OF MOTION  AROM Flexion Abduction ER   Base Ext/IR   Left 160 160  Ant Drift: none 70 na   Right 150 150  Ant Drift: none 55 T7   Pain: none        PROM Flexion Abd ER 90-90 IR Scap Stabilized Horizontal Adduction   Left 180 na    Base: 88  45: na  90-90: 95 45: na  90-90: 60 na   Right 145 na Base: 72  45: na  90-90: 65 80 (self) / 75 post PT 45: na  90-90: 45 /  post na      23

## 2023-05-26 ENCOUNTER — APPOINTMENT (OUTPATIENT)
Dept: URBAN - METROPOLITAN AREA CLINIC 260 | Age: 23
Setting detail: DERMATOLOGY
End: 2023-05-26

## 2023-05-26 VITALS — WEIGHT: 125 LBS | HEIGHT: 63 IN

## 2023-05-26 DIAGNOSIS — L24 IRRITANT CONTACT DERMATITIS: ICD-10-CM

## 2023-05-26 PROBLEM — L24.9 IRRITANT CONTACT DERMATITIS, UNSPECIFIED CAUSE: Status: ACTIVE | Noted: 2023-05-26

## 2023-05-26 PROCEDURE — OTHER PRESCRIPTION: OTHER

## 2023-05-26 PROCEDURE — OTHER PRESCRIPTION MEDICATION MANAGEMENT: OTHER

## 2023-05-26 PROCEDURE — OTHER MIPS QUALITY: OTHER

## 2023-05-26 PROCEDURE — 99203 OFFICE O/P NEW LOW 30 MIN: CPT

## 2023-05-26 PROCEDURE — OTHER COUNSELING: OTHER

## 2023-05-26 RX ORDER — FLUTICASONE PROPIONATE 0.5 MG/G
CREAM TOPICAL BID
Qty: 30 | Refills: 1 | Status: ERX | COMMUNITY
Start: 2023-05-26

## 2023-05-26 ASSESSMENT — LOCATION DETAILED DESCRIPTION DERM: LOCATION DETAILED: RIGHT CENTRAL BUCCAL CHEEK

## 2023-05-26 ASSESSMENT — LOCATION ZONE DERM: LOCATION ZONE: FACE

## 2023-05-26 ASSESSMENT — LOCATION SIMPLE DESCRIPTION DERM: LOCATION SIMPLE: RIGHT CHEEK

## 2023-05-26 NOTE — PROCEDURE: PRESCRIPTION MEDICATION MANAGEMENT
Plan: Return if not improved in 2 weeks
Detail Level: Zone
Initiate Treatment: Fluticasone
Render In Strict Bullet Format?: No

## 2023-05-26 NOTE — HPI: EVALUATION OF SKIN LESION(S)
Hpi Title: Evaluation of a Skin Lesion
Additional History: Was traveling in [place] and noticed it after she got off plane. She has a hx of eczema but this does not feel similar

## 2023-06-07 ENCOUNTER — THERAPY VISIT (OUTPATIENT)
Dept: PHYSICAL THERAPY | Facility: CLINIC | Age: 23
End: 2023-06-07
Payer: COMMERCIAL

## 2023-06-07 DIAGNOSIS — G89.29 CHRONIC RIGHT SHOULDER PAIN: ICD-10-CM

## 2023-06-07 DIAGNOSIS — M25.511 CHRONIC RIGHT SHOULDER PAIN: ICD-10-CM

## 2023-06-07 DIAGNOSIS — Z98.890 S/P SHOULDER SURGERY: ICD-10-CM

## 2023-06-07 DIAGNOSIS — S43.431D BANKART LESION OF RIGHT SHOULDER, SUBSEQUENT ENCOUNTER: Primary | ICD-10-CM

## 2023-06-07 PROCEDURE — 97110 THERAPEUTIC EXERCISES: CPT | Mod: GP | Performed by: PHYSICAL THERAPIST

## 2023-06-07 PROCEDURE — 97530 THERAPEUTIC ACTIVITIES: CPT | Mod: GP | Performed by: PHYSICAL THERAPIST

## 2023-06-07 PROCEDURE — 97140 MANUAL THERAPY 1/> REGIONS: CPT | Mod: GP | Performed by: PHYSICAL THERAPIST

## 2023-06-07 NOTE — PROGRESS NOTES
Diagnosis: R shoulder arthroscopic labral repair   Precautions/Restrictions/MD instructions/Other pertinent hx Arthroscopic Bankart protocol    Therapist Impression:   Still pinchy with 90-90 ER.  Improves with posterior glide.  Trial band assisted 90-90 ER to apply similar posterior glide tor home. Rousseau better with contract relax sleeper as well.    Within 15 deg for both 90-90 ER and IR ROM.     GOALS: working out    NEXT: STM as needed, retry ball Y and W    PTRX: handouts    Subjective:  Quick motion brought on discomfort.  Reports increasing numbness and with compression can go to wrist.  Doing more running    Objective:  RANGE OF MOTION  SHOULDER RANGE OF MOTION  AROM Flexion Abduction ER   Base Ext/IR   Left 160 160  Ant Drift: none 70 na   Right 150 150  Ant Drift: none 55 T7   Pain: none        PROM Flexion Abd ER 90-90 IR Scap Stabilized Horizontal Adduction   Left 180 na    Base: 88  45: na  90-90: 95 45: na  90-90: 60 na   Right 145 na Base: 78  45: na  90-90: 75 / 80 45: na  90-90: 45 /  post na

## 2023-06-26 ENCOUNTER — THERAPY VISIT (OUTPATIENT)
Dept: PHYSICAL THERAPY | Facility: CLINIC | Age: 23
End: 2023-06-26
Payer: COMMERCIAL

## 2023-06-26 DIAGNOSIS — G89.29 CHRONIC RIGHT SHOULDER PAIN: ICD-10-CM

## 2023-06-26 DIAGNOSIS — Z98.890 S/P SHOULDER SURGERY: ICD-10-CM

## 2023-06-26 DIAGNOSIS — M25.511 CHRONIC RIGHT SHOULDER PAIN: ICD-10-CM

## 2023-06-26 DIAGNOSIS — S43.431D BANKART LESION OF RIGHT SHOULDER, SUBSEQUENT ENCOUNTER: Primary | ICD-10-CM

## 2023-06-26 PROCEDURE — 97530 THERAPEUTIC ACTIVITIES: CPT | Mod: GP | Performed by: PHYSICAL THERAPIST

## 2023-06-26 PROCEDURE — 97140 MANUAL THERAPY 1/> REGIONS: CPT | Mod: 59 | Performed by: PHYSICAL THERAPIST

## 2023-06-26 PROCEDURE — 97110 THERAPEUTIC EXERCISES: CPT | Mod: 59 | Performed by: PHYSICAL THERAPIST

## 2023-06-26 NOTE — PROGRESS NOTES
Diagnosis: R shoulder arthroscopic labral repair   Precautions/Restrictions/MD instructions/Other pertinent hx Arthroscopic Bankart protocol    Therapist Impression:   Improved 90-90 ER.  Focus on ROM exercises only for this motion.  Add in TPR to posterior RC to help with 90-90 IR    Within 5-10 deg of motion.     GOALS: working out    NEXT: STM as needed, retry ball Y and W    PTRX: handouts    Subjective:  Quick motion brought on discomfort.  Reports increasing numbness and with compression can go to wrist.  Doing more running    Objective:  RANGE OF MOTION  SHOULDER RANGE OF MOTION  AROM Flexion Abduction ER   Base Ext/IR   Left 160 160  Ant Drift: none 70 na   Right 150 150  Ant Drift: none 60 T7   Pain: none        PROM Flexion Abd ER 90-90 IR Scap Stabilized Horizontal Adduction   Left 180 na    Base: 88  45: na  90-90: 95 45: na  90-90: 70 na   Right 145 na Base: 85  45: na  90-90: 86 / 80 45: na  90-90: 58 na

## 2023-07-07 ENCOUNTER — OFFICE VISIT (OUTPATIENT)
Dept: ORTHOPEDICS | Facility: CLINIC | Age: 23
End: 2023-07-07
Payer: COMMERCIAL

## 2023-07-07 VITALS — WEIGHT: 120 LBS | HEIGHT: 63 IN | BODY MASS INDEX: 21.26 KG/M2

## 2023-07-07 DIAGNOSIS — Z98.890 S/P SHOULDER SURGERY: Primary | ICD-10-CM

## 2023-07-07 PROCEDURE — 99213 OFFICE O/P EST LOW 20 MIN: CPT | Performed by: ORTHOPAEDIC SURGERY

## 2023-07-07 NOTE — LETTER
7/7/2023         RE: Leonela Mclean  [address] [facility] [address]        Dear Colleague,    Thank you for referring your patient, Leonela Mclean, to the [facility] [place]. Please see a copy of my visit note below.    Chief Complaint:  Postoperative visit, right shoulder    Date of Surgery:  01/0/2033    History of Present Illness:  Leonela Mclean is a 23-year-old female who is now 6 months status post right arthroscopic labral repair and capsulorrhaphy.  Overall doing well.  Ranks her shoulder is a 70 out of 100.  Has no instability.  Still feels a bit stiff.  No significant pain but does have some soreness.    Physical Examination:  23-year-old female alert oriented no apparent distress.  Range of motion is 170/40/85/15.  This compares to 180/45/90/40 on the contralateral shoulder.  This is essentially unchanged from her last visit.  Her rotator cuff strength is 5/5.  She has no apprehension.  She has no posterior apprehension.    Impression:  6 months status post right shoulder arthroscopic labral repair and capsulorrhaphy.  Shoulder is stable.  She is a bit tight.  At this point we both feel that this is better than the previous quite unstable shoulder.  I do think she can continue to work on capsular stretching.  We may be able to regain a bit more motion.  I also encouraged her to run the use an elliptical  etc. to help with motion.  Her questions were answered.    Plan:  Continue a capsular stretching program.  We can be a bit more aggressive.  Continue cuff and periscapular strengthening.  Follow-up with me in 8 weeks for routine recheck.        Again, thank you for allowing me to participate in the care of your patient.        Sincerely,        Emiliano Alejandre MD

## 2023-07-07 NOTE — PROGRESS NOTES
Chief Complaint:  Postoperative visit, right shoulder    Date of Surgery:  01/0/2033    History of Present Illness:  Leonela Mclean is a 23-year-old female who is now 6 months status post right arthroscopic labral repair and capsulorrhaphy.  Overall doing well.  Ranks her shoulder is a 70 out of 100.  Has no instability.  Still feels a bit stiff.  No significant pain but does have some soreness.    Physical Examination:  23-year-old female alert oriented no apparent distress.  Range of motion is 170/40/85/15.  This compares to 180/45/90/40 on the contralateral shoulder.  This is essentially unchanged from her last visit.  Her rotator cuff strength is 5/5.  She has no apprehension.  She has no posterior apprehension.    Impression:  6 months status post right shoulder arthroscopic labral repair and capsulorrhaphy.  Shoulder is stable.  She is a bit tight.  At this point we both feel that this is better than the previous quite unstable shoulder.  I do think she can continue to work on capsular stretching.  We may be able to regain a bit more motion.  I also encouraged her to run the use an elliptical  etc. to help with motion.  Her questions were answered.    Plan:  1. Continue a capsular stretching program.  We can be a bit more aggressive.  2. Continue cuff and periscapular strengthening.  3. Follow-up with me in 8 weeks for routine recheck.

## 2023-07-26 ENCOUNTER — THERAPY VISIT (OUTPATIENT)
Dept: PHYSICAL THERAPY | Facility: CLINIC | Age: 23
End: 2023-07-26
Payer: COMMERCIAL

## 2023-07-26 DIAGNOSIS — Z98.890 S/P SHOULDER SURGERY: ICD-10-CM

## 2023-07-26 DIAGNOSIS — M25.511 CHRONIC RIGHT SHOULDER PAIN: ICD-10-CM

## 2023-07-26 DIAGNOSIS — S43.431D BANKART LESION OF RIGHT SHOULDER, SUBSEQUENT ENCOUNTER: Primary | ICD-10-CM

## 2023-07-26 DIAGNOSIS — G89.29 CHRONIC RIGHT SHOULDER PAIN: ICD-10-CM

## 2023-07-26 PROCEDURE — 97110 THERAPEUTIC EXERCISES: CPT | Mod: 59 | Performed by: PHYSICAL THERAPIST

## 2023-07-26 PROCEDURE — 97530 THERAPEUTIC ACTIVITIES: CPT | Mod: GP | Performed by: PHYSICAL THERAPIST

## 2023-07-26 NOTE — PROGRESS NOTES
PLAN  Continue therapy per current plan of care.    Beginning/End Dates of Progress Note Reporting Period:  06/12/23 to 07/26/2023    Referring Provider:  Emiliano Alejandre    Diagnosis: R shoulder arthroscopic labral repair   Precautions/Restrictions/MD instructions/Other pertinent hx Arthroscopic Bankart protocol    Therapist Impression:   Limited posterior shoulder IR ROM.  Continue to focus on this and gradually work on progressing OH strength.  See below for strength testing scores.    Within 5-10 deg of motion.     GOALS: working out    NEXT: STM as needed, retry ball Y and W    PTRX: handouts    Subjective:  Intermittent pain with certain movements.  Throwing motions are bothersome and holding something out.      Objective:  RANGE OF MOTION  SHOULDER RANGE OF MOTION  AROM Flexion Abduction ER   Base Ext/IR   Left 160 160  Ant Drift: none 70 na   Right 150 150  Ant Drift: none 60 T7   Pain: none        PROM Flexion Abd ER 90-90 IR Scap Stabilized Horizontal Adduction   Left 180 na    Base: 88  45: na  90-90: 95 45: na  90-90: 70 na   Right 145 na Base: 85  45: na  90-90: 86 45: na  90-90: 41 na       Shoulder HHD (in lbs) Flexion Abduction ER IR   Left 18 17 21 32   Right 17 14 18 30   LSI% 94 82 86 94

## 2023-09-01 ENCOUNTER — OFFICE VISIT (OUTPATIENT)
Dept: ORTHOPEDICS | Facility: CLINIC | Age: 23
End: 2023-09-01
Payer: COMMERCIAL

## 2023-09-01 VITALS — WEIGHT: 120 LBS | HEIGHT: 63 IN | BODY MASS INDEX: 21.26 KG/M2

## 2023-09-01 DIAGNOSIS — Z98.890 S/P SHOULDER SURGERY: Primary | ICD-10-CM

## 2023-09-01 PROCEDURE — 99212 OFFICE O/P EST SF 10 MIN: CPT | Performed by: ORTHOPAEDIC SURGERY

## 2023-09-01 NOTE — LETTER
9/1/2023     RE: Leonela Mclean  [address] [facility] [address]    Dear Colleague,    Thank you for referring your patient, Leonela Mclean, to the [facility] [place]. Please see a copy of my visit note below.    Chief Complaint:  Postoperative visit, right shoulder    Date of Surgery:  01/04/2023    History of Present Illness:  Leonela is a 23-year-old female who is now 8 months status post right shoulder arthroscopic labral repair.  She continues to improve.  Now ranks her shoulder an 80 out of 100.  Her shoulder feels stable.  Her motion is improving.  She is having some anterior shoulder pain and points to her biceps.    Physical Examination:  Range of motion is 180/45/90/20.  This compares to 180/45/90/40 on the contralateral shoulder.  Her rotator cuff strength is 5/5.  She has a negative apprehension.    Impression:  8 months status post right shoulder arthroscopic labral repair and capsulorrhaphy.  And is doing well.  She has some biceps tendon irritation.  I think this will quiet down.  She continues to lack internal rotation.  We discussed the importance of a posterior capsular stretching program.  We reviewed the sleeper stretch.  We discussed using some occasional anti-inflammatory medicine if necessary for her biceps.    Plan:  Continue rehabilitation.  She will focus on posterior capsular stretching.  Symptomatic management of her irritated biceps tendon.  Follow-up with me in 4 months for a routine 1 year recheck.        Emiliano Alejandre MD

## 2023-09-15 ENCOUNTER — OFFICE VISIT (OUTPATIENT)
Dept: FAMILY MEDICINE | Facility: CLINIC | Age: 23
End: 2023-09-15
Payer: COMMERCIAL

## 2023-09-15 VITALS
SYSTOLIC BLOOD PRESSURE: 103 MMHG | TEMPERATURE: 98.2 F | OXYGEN SATURATION: 100 % | WEIGHT: 127 LBS | RESPIRATION RATE: 12 BRPM | HEIGHT: 63 IN | HEART RATE: 53 BPM | DIASTOLIC BLOOD PRESSURE: 68 MMHG | BODY MASS INDEX: 22.5 KG/M2

## 2023-09-15 DIAGNOSIS — R53.83 FATIGUE, UNSPECIFIED TYPE: ICD-10-CM

## 2023-09-15 DIAGNOSIS — Z00.00 VISIT FOR PREVENTIVE HEALTH EXAMINATION: Primary | ICD-10-CM

## 2023-09-15 DIAGNOSIS — R68.89 COLD FEELING: ICD-10-CM

## 2023-09-15 DIAGNOSIS — R07.89 ATYPICAL CHEST PAIN: ICD-10-CM

## 2023-09-15 DIAGNOSIS — R53.83 TIRED: ICD-10-CM

## 2023-09-15 DIAGNOSIS — N91.1 SECONDARY AMENORRHEA: ICD-10-CM

## 2023-09-15 DIAGNOSIS — R00.1 SINUS BRADYCARDIA: ICD-10-CM

## 2023-09-15 LAB
ERYTHROCYTE [DISTWIDTH] IN BLOOD BY AUTOMATED COUNT: 12.8 % (ref 10–15)
HCT VFR BLD AUTO: 37 % (ref 35–47)
HGB BLD-MCNC: 12.4 G/DL (ref 11.7–15.7)
MCH RBC QN AUTO: 31.3 PG (ref 26.5–33)
MCHC RBC AUTO-ENTMCNC: 33.5 G/DL (ref 31.5–36.5)
MCV RBC AUTO: 93 FL (ref 78–100)
PLATELET # BLD AUTO: 205 10E3/UL (ref 150–450)
RBC # BLD AUTO: 3.96 10E6/UL (ref 3.8–5.2)
TSH SERPL DL<=0.005 MIU/L-ACNC: 2.3 UIU/ML (ref 0.3–4.2)
WBC # BLD AUTO: 4.5 10E3/UL (ref 4–11)

## 2023-09-15 PROCEDURE — 99213 OFFICE O/P EST LOW 20 MIN: CPT | Mod: 25 | Performed by: NURSE PRACTITIONER

## 2023-09-15 PROCEDURE — 99395 PREV VISIT EST AGE 18-39: CPT | Performed by: NURSE PRACTITIONER

## 2023-09-15 PROCEDURE — 85027 COMPLETE CBC AUTOMATED: CPT | Performed by: NURSE PRACTITIONER

## 2023-09-15 PROCEDURE — 84443 ASSAY THYROID STIM HORMONE: CPT | Performed by: NURSE PRACTITIONER

## 2023-09-15 PROCEDURE — 36415 COLL VENOUS BLD VENIPUNCTURE: CPT | Performed by: NURSE PRACTITIONER

## 2023-09-15 ASSESSMENT — ENCOUNTER SYMPTOMS
DIZZINESS: 0
ARTHRALGIAS: 0
HEADACHES: 0
EYE PAIN: 0
HEARTBURN: 0
CONSTIPATION: 0
ABDOMINAL PAIN: 0
JOINT SWELLING: 0
HEMATOCHEZIA: 0
HEMATURIA: 0
SORE THROAT: 0
NAUSEA: 0
FEVER: 0
COUGH: 0
PALPITATIONS: 0
BREAST MASS: 0
CHILLS: 0
NERVOUS/ANXIOUS: 0
PARESTHESIAS: 0
SHORTNESS OF BREATH: 0
WEAKNESS: 0
DYSURIA: 0
DIARRHEA: 0
MYALGIAS: 0
FREQUENCY: 0

## 2023-09-15 NOTE — PROGRESS NOTES
SUBJECTIVE:   CC: Leonela is an 23 year old who presents for preventive health visit.   -She can feel cold, and has had chronic low heart rates.  She sometimes will notice that her right thigh has a sensation of numbness.  She is wondering if her thyroid level is within normal range.  She does have a history of anxiety, but feels this is under control.  She also has a prior history of eating disorder, and feels that this is under control now.  She feels she is appropriately. She stated that her father was diagnosed she thinks with hypertrophy of the left ventricle.  She stated that there is no history of by basic valves, autoimmune dx, or other heart dx such as sudden deaths or MI.   She denied any episodes of fainting or syncope, but sometimes she will noticed sporadic chest pains that occur at random times without any other symptoms such as leg edema, dizziness, syncope, or shortness of breath when lying flat. She tolerates exercise and enjoys being active.   - she does feel tired, this has been on-going.   -She still does not have periods and this was worked up.         9/15/2023     3:40 PM   Additional Questions   Roomed by Pollo       Healthy Habits:     Getting at least 3 servings of Calcium per day:  Yes    Bi-annual eye exam:  Yes    Dental care twice a year:  Yes    Sleep apnea or symptoms of sleep apnea:  None    Diet:  Other    Frequency of exercise:  6-7 days/week    Duration of exercise:  45-60 minutes    Taking medications regularly:  Not Applicable    Additional concerns today:  Yes      Social History     Tobacco Use    Smoking status: Never    Smokeless tobacco: Never   Substance Use Topics    Alcohol use: Yes     Comment: occasional             9/15/2023     3:34 PM   Alcohol Use   Prescreen: >3 drinks/day or >7 drinks/week? Not Applicable          No data to display              Reviewed orders with patient.  Reviewed health maintenance and updated orders accordingly - Yes  Lab work is in  process  Labs reviewed in EPIC  BP Readings from Last 3 Encounters:   09/15/23 103/68   01/04/23 97/48   12/05/22 108/65    Wt Readings from Last 3 Encounters:   09/15/23 57.6 kg (127 lb)   09/01/23 54.4 kg (120 lb)   07/07/23 54.4 kg (120 lb)                    Breast Cancer Screening:        History of abnormal Pap smear: NO - age 21-29 PAP every 3 years recommended     Reviewed and updated as needed this visit by clinical staff   Tobacco  Allergies  Meds              Reviewed and updated as needed this visit by Provider                 Past Medical History:   Diagnosis Date    Amenorrhea     Chronic right shoulder pain     Seasonal allergic rhinitis       Past Surgical History:   Procedure Laterality Date    ARTHROSCOPY SHOULDER BANKHART REPAIR Right 01/04/2023    Procedure: RIGHT ARTHROSCOPY SHOULDER, WITH ARTHROSCOPIC BANKART REPAIR AND  CAPSULAR PLICATION.;  Surgeon: Emiliano Alejandre MD;  Location: [facility] OR    ARTHROSCOPY SHOULDER SUPERIOR LABRUM ANTERIOR TO POSTERIOR REPAIR Right 09/09/2020    Procedure: right shoulder arthroscopy, posterior labral repair;  Surgeon: Emiliano Alejandre MD;  Location: [facility]    C UNLISTED PROCEDURE, ABDOMEN/PERITONEUM/OMENTUM      Description: Hernia Repair;  Recorded: 12/19/2012;  Comments: 2006    HERNIA REPAIR  ~2006    Inguinal hernia repair    INGUINAL HERNIA REPAIR  01/01/2007    wisdom teeth extraction         Review of Systems   Constitutional:  Negative for chills and fever.   HENT:  Negative for congestion, ear pain, hearing loss and sore throat.    Eyes:  Negative for pain and visual disturbance.   Respiratory:  Negative for cough and shortness of breath.    Cardiovascular:  Negative for chest pain, palpitations and peripheral edema.   Gastrointestinal:  Negative for abdominal pain, constipation, diarrhea, heartburn, hematochezia and nausea.   Breasts:  Negative for tenderness, breast mass and discharge.   Genitourinary:  Negative for dysuria, frequency,  "genital sores, hematuria, pelvic pain, urgency, vaginal bleeding and vaginal discharge.   Musculoskeletal:  Negative for arthralgias, joint swelling and myalgias.   Skin:  Negative for rash.   Neurological:  Negative for dizziness, weakness, headaches and paresthesias.   Psychiatric/Behavioral:  Negative for mood changes. The patient is not nervous/anxious.         OBJECTIVE:   /68   Pulse 53   Temp 98.2  F (36.8  C) (Oral)   Resp 12   Ht 1.594 m (5' 2.75\")   Wt 57.6 kg (127 lb)   SpO2 100%   BMI 22.68 kg/m    Physical Exam  Vitals and nursing note reviewed.   Constitutional:       Appearance: Normal appearance. She is normal weight.   HENT:      Mouth/Throat:      Mouth: Mucous membranes are moist.   Cardiovascular:      Rate and Rhythm: Regular rhythm. Bradycardia present.      Pulses: Normal pulses.      Heart sounds:      No friction rub. No gallop.      Comments: Questionable murmur heard over the opposite of the 4-5th intercostal junction  Pulmonary:      Effort: Pulmonary effort is normal.      Breath sounds: Normal breath sounds.   Abdominal:      Palpations: Abdomen is soft.   Musculoskeletal:         General: No swelling. Normal range of motion.      Cervical back: Normal range of motion. No tenderness.      Right lower leg: No edema.      Left lower leg: No edema.   Lymphadenopathy:      Cervical: No cervical adenopathy.   Skin:     General: Skin is warm and dry.      Capillary Refill: Capillary refill takes less than 2 seconds.      Coloration: Skin is not jaundiced or pale.      Findings: No bruising, erythema or rash.   Neurological:      General: No focal deficit present.      Mental Status: She is alert and oriented to person, place, and time.      Motor: No weakness.      Gait: Gait normal.   Psychiatric:         Mood and Affect: Mood normal.         Behavior: Behavior normal.         Thought Content: Thought content normal.         Judgment: Judgment normal.         Diagnostic Test " Results:  Labs reviewed in Epic    ASSESSMENT/PLAN:       ICD-10-CM    1. Visit for preventive health examination  Z00.00       2. Tired  R53.83 TSH with free T4 reflex     CBC with platelets     Echocardiogram Complete     TSH with free T4 reflex     CBC with platelets      3. Cold feeling  R68.89 TSH with free T4 reflex     CBC with platelets     TSH with free T4 reflex     CBC with platelets      4. Sinus bradycardia  R00.1 Echocardiogram Complete      5. Fatigue, unspecified type  R53.83       6. Atypical chest pain  R07.89 Echocardiogram Complete      7. Secondary amenorrhea  N91.1         - ECHO ordered. Warning signs discussed. Chest pains appear atypical but will further evaluate with ECHO. Murmur noted but heart rate is slow. Her father has left ventricular hypertrophy, not sure if related to HTN. We looked into if there is a genetic linkage today and we did not find an exact link. EKG's in past have been normal, those were also reviewed with patient today. It appears tiredness is not new. Reassuring TSH and CBC are within normal range. May need to look into discussing no periods with OB or Endo. She does not want to start Birth control but would like to have kids potentially one day. Eating and weight appears stable. I also encouraged her to stay hydrated. Anxiety and mental health appears stable.     Patient has been advised of split billing requirements and indicates understanding: Yes      COUNSELING:  Reviewed preventive health counseling, as reflected in patient instructions        She reports that she has never smoked. She has never used smokeless tobacco.          CAROLANN Vitale [facility]

## 2023-09-18 NOTE — RESULT ENCOUNTER NOTE
Rey Gipson    It was a pleasure chit chatting with you this past week.     Your labs all look good and within normal ranges.     Let me know if you have any questions! Good luck with [employer]!!    Roxanne

## 2023-09-19 NOTE — PROGRESS NOTES
Chief Complaint:  Postoperative visit, right shoulder    Date of Surgery:  01/04/2023    History of Present Illness:  Leonela is a 23-year-old female who is now 8 months status post right shoulder arthroscopic labral repair.  She continues to improve.  Now ranks her shoulder an 80 out of 100.  Her shoulder feels stable.  Her motion is improving.  She is having some anterior shoulder pain and points to her biceps.    Physical Examination:  Range of motion is 180/45/90/20.  This compares to 180/45/90/40 on the contralateral shoulder.  Her rotator cuff strength is 5/5.  She has a negative apprehension.    Impression:  8 months status post right shoulder arthroscopic labral repair and capsulorrhaphy.  And is doing well.  She has some biceps tendon irritation.  I think this will quiet down.  She continues to lack internal rotation.  We discussed the importance of a posterior capsular stretching program.  We reviewed the sleeper stretch.  We discussed using some occasional anti-inflammatory medicine if necessary for her biceps.    Plan:  Continue rehabilitation.  She will focus on posterior capsular stretching.  Symptomatic management of her irritated biceps tendon.  Follow-up with me in 4 months for a routine 1 year recheck.

## 2023-10-02 ENCOUNTER — HOSPITAL ENCOUNTER (OUTPATIENT)
Dept: CARDIOLOGY | Facility: CLINIC | Age: 23
Discharge: HOME | End: 2023-10-02
Attending: NURSE PRACTITIONER | Admitting: NURSE PRACTITIONER
Payer: COMMERCIAL

## 2023-10-02 DIAGNOSIS — R00.1 SINUS BRADYCARDIA: ICD-10-CM

## 2023-10-02 DIAGNOSIS — R53.83 TIRED: ICD-10-CM

## 2023-10-02 DIAGNOSIS — R07.89 ATYPICAL CHEST PAIN: ICD-10-CM

## 2023-10-02 LAB — LVEF ECHO: NORMAL

## 2023-10-02 PROCEDURE — 93306 TTE W/DOPPLER COMPLETE: CPT

## 2023-10-02 PROCEDURE — 93306 TTE W/DOPPLER COMPLETE: CPT | Mod: 26 | Performed by: INTERNAL MEDICINE

## 2023-10-06 ENCOUNTER — MYC MEDICAL ADVICE (OUTPATIENT)
Dept: FAMILY MEDICINE | Facility: CLINIC | Age: 23
End: 2023-10-06
Payer: COMMERCIAL

## 2023-10-06 ENCOUNTER — THERAPY VISIT (OUTPATIENT)
Dept: PHYSICAL THERAPY | Facility: CLINIC | Age: 23
End: 2023-10-06
Payer: COMMERCIAL

## 2023-10-06 DIAGNOSIS — M25.511 CHRONIC RIGHT SHOULDER PAIN: ICD-10-CM

## 2023-10-06 DIAGNOSIS — G89.29 CHRONIC RIGHT SHOULDER PAIN: ICD-10-CM

## 2023-10-06 DIAGNOSIS — S43.431D BANKART LESION OF RIGHT SHOULDER, SUBSEQUENT ENCOUNTER: Primary | ICD-10-CM

## 2023-10-06 DIAGNOSIS — Z98.890 S/P SHOULDER SURGERY: ICD-10-CM

## 2023-10-06 PROCEDURE — 97530 THERAPEUTIC ACTIVITIES: CPT | Mod: GP | Performed by: PHYSICAL THERAPIST

## 2023-10-06 PROCEDURE — 97110 THERAPEUTIC EXERCISES: CPT | Mod: GP | Performed by: PHYSICAL THERAPIST

## 2023-10-06 NOTE — RESULT ENCOUNTER NOTE
Rey Gipson    Your ECHO is normal, good news!!     If you have questions, please let me know.     Roxanne

## 2023-10-09 NOTE — TELEPHONE ENCOUNTER
See MyChart from patient needing PCP review. LOV 09/15/2023. It does not appear Celiac was discussed at appt.     Please respond directly to patient if at all able.    Krys Diamond RN  [facility]

## 2023-10-09 NOTE — TELEPHONE ENCOUNTER
10/09/23  Pt called to check status of this message. Relayed to pt that we will reach back out once provider/covering provider has seen message. Please advise and reach back out.  Nicol

## 2023-10-09 NOTE — TELEPHONE ENCOUNTER
Called and spoke with patient. Advised of Dr. Bailon's recommendations.  Assisted patient in scheduling office visit with CONCEPCION Younger this week.

## 2023-10-09 NOTE — TELEPHONE ENCOUNTER
Patient: Lachelle Langston Date: 2019   : 1941    77 year old female      INPATIENT WOUND CARE PROGRESS NOTE    Supervising Wound Care / Hyperbaric Medicine Physician: Not Applicable  Consulting Provider:  Yanira Artis NP  Date of Consultation/Last Comprehensive Exam:  3/26/19  Referring  Provider:  Dr. Zavala    SUBJECTIVE:    Chief Complaint:  Left lateral ankle wound      Wound/Ulcer Present:    Traumatic ulcer    Additional Wound Category:  None     Maximum Baseline Ambulatory Status:  Unable to assess    History of Present Illness:  This is a 77 year old female with a PMH significant for anxiety, asthma, CHF, depression, and RAD seen in wound care consult for a traumatic left lateral ankle wound that has been present for ~5 weeks. Patient said she scraped the outside of her ankle at home. No current wound care. Leaving open to air.     Ad[facility] to [facility] on 3/26/19 with SOB; direct admission from the pulmonary clinic. She was recently discharged from the hospital. After leaving the hospital she was on 1 L/m of oxygen with rest and 4 L with activity. Her significant other brought a pulse oximeter and found that she was very short of breath with pulse ox in the 70s on 1 L/m after about 3 or 4 days at home so they increased the oxygen as far as it'll go on a concentrator.     Interval history the patient was seen and evaluated while resting comfortably in hospital bed. She is tolerating daily wound therapy to left ankle. She denies any fever, chills, sweats, nausea or vomiting.    Current Treatment Regimen:  Dressing:  Bactroban gauze   Frequency:  Daily   Changed by:  Staff/bedside nurse    Review of Systems:  Pertinent items are noted in HPI (history of present illness).    Past Medical History:   Diagnosis Date   • Anxiety    • Asthma    • Bradycardia    • Cardiac failure congestive    • Carpal tunnel syndrome    • Choledocholithiasis 2018   • Chronic back pain    • Chronic depression 2016  Recommend visit to discuss symptoms and to ensure appropriate labs are obtained.  Please help schedule with Sanjuanita Younger at [facility] as Dr. Porter is out of clinic this week     • Chronic kidney disease (CKD), stage III (moderate) (CMS/[facility])    • Chronic pain     back   • Constipation    • COPD (chronic obstructive pulmonary disease) (CMS/[facility])    • Coronary artery disease     H/O CABG, CORONORY STENTS   • Coronary atherosclerosis    • Costochondritis 09/2018   • DJD (degenerative joint disease)    • Edema    • Ganglion cyst    • Heart murmur    • HTN (hypertension)    • Hyperglycemia    • Hyperlipidemia    • Impaired mobility and ADLs     uses wheelchair or 4 wheeled walker for long distances   • MRSA (methicillin resistant Staphylococcus aureus)    • Neuropathic pain    • Osteoarthritis    • RAD (reactive airway disease)    • Restless leg syndrome    • Small cell lung cancer (CMS/[facility])     metastasis to supraclavicular lymph node    • Squamous cell carcinoma of right lung (CMS/[facility])    • Staphylococcus aureus infection     & VRE   • Toe ulcer, left, with necrosis of bone (CMS/[facility]) 12/5/2018   • Transfusion history 04/2007    with CABG   • 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    • Ulcer     foot ulcer   • Urinary tract infection    • Vitamin B 12 deficiency    • Wears dentures     upper and lower   • Wound of left leg     MRSA; also has small ulcer on R foot     Past Surgical History:   Procedure Laterality Date   • Ankle surgery     • Appendectomy     • Back surgery      l[facility]   • Bronchoscopy Right 8/3/2016    lower lobectomy   • Cardiac catherization  05/2011    LIMA->LAD 60% stenosis, other grafts patent   • Cardiac catherization  12/2011    RCA severe stenosis   • Cardiac surgery  04/07/2007    CABG-LIMA->LAD, SVG->OM1, SVG->PDA   • Carpal tunnel release     • Cervical spine surgery      x3 discectomy   • Eye surgery      LASIK   • Finger amputation Left 1962    small, ring, middle fingers - \"work related\"   • Foot surgery Bilateral     arches/ fusions on right / left foot bone surgery and arch repair   • Ganglion cyst excision  08/09/2017    Left wrist    • Ir lung biopsy  16   • Pacemaker implant  2016    Medtr[employer]   • Ptca  2003    drug eluting stent mid left circ   • Ptca  2003    drug eluting stent RCA and circ for instent re-stenosis   • Ptca  2004    angioplasty for in-stent restenosis at RCA, and brachytherapy of RCA   • Ptca  2007    stent x 3 RCA and VG->PDA   • Ptca  10/29/2015    drug eluting stents RCA and Left cx   • Ptca  2016    stent SVG->PDA other grafts patent   • Removal gallbladder     • Toe amputation Right     small toe   • Tonsillectomy and adenoidectomy Left    • Total knee replacement Left 10/27/2012   • Tubal ligation       Social History     Socioeconomic History   • Marital status:      Spouse name: Not on file   • Number of children: 2   • Years of education: Not on file   • Highest education level: Not on file   Social Needs   • Financial resource strain: Not on file   • Food insecurity - worry: Not on file   • Food insecurity - inability: Not on file   • Transportation needs - medical: Not on file   • Transportation needs - non-medical: Not on file   Occupational History   • Occupation: City Worker     Comment: retired   Tobacco Use   • Smoking status: Former Smoker     Packs/day: 1.50     Years: 36.00     Pack years: 54.00     Types: Cigarettes     Start date: 1960     Last attempt to quit: 1996     Years since quittin.2   • Smokeless tobacco: Never Used   Substance and Sexual Activity   • Alcohol use: No     Alcohol/week: 0.0 oz   • Drug use: No   • Sexual activity: Not Currently     Partners: Male     Comment: Lives with Pat   Other Topics Concern   •  Service No   • Blood Transfusions Yes   • Caffeine Concern No   • Occupational Exposure No   • Hobby Hazards No   • Sleep Concern No   • Stress Concern No   • Weight Concern No   • Special Diet Yes   • Back Care Yes   • Exercise No   • Bike Helmet No   • Seat Belt Yes   • Self-Exams Yes   Social History Narrative    • Not on file     Family History   Problem Relation Age of Onset   • High cholesterol Father    • Heart disease Father         Pacemaker   • Other Father         partial paralysis of arms.   • Hypertension Father    • Other Mother         Senile Dementia   • High blood pressure Mother    • Stroke Mother    • Hypertension Mother    • Hyperlipidemia Mother         Hyperlipidemia   • High cholesterol Mother    • Heart disease Brother 47        MI   • Myocardial Infarction Brother    • High blood pressure Brother    • Cancer Brother         Testicular   • High cholesterol Brother    • Hypertension Brother    • Cancer Sister         bilateral mastectomy       Current Facility-Administered Medications   Medication   • rOPINIRole (REQUIP) tablet 2 mg   • furosemide (LASIX) injection 40 mg   • aspirin chewable 81 mg   • apixaban (ELIQUIS) tablet 5 mg   • sodium chloride 0.9% infusion   • predniSONE (DELTASONE) tablet 60 mg   • pantoprazole (PROTONIX) EC tablet 40 mg   • ALPRAZolam (XANAX) tablet 0.25 mg   • albuterol-ipratropium 2.5 mg/0.5 mg (DUONEB) nebulizer solution 3 mL   • albuterol-ipratropium (COMBIVENT RESPIMAT) inhaler 1 puff   • cholecalciferol (VITAMIN D3) tablet 2,000 Units   • ferrous sulfate (65 mg Fe per 325 mg) tablet 325 mg   • fluticasone-salmeterol (ADVAIR DISKUS) 100-50 MCG/DOSE inhaler 1 puff   • isosorbide mononitrate (IMDUR) ER tablet 60 mg   • metoPROLOL succinate (TOPROL-XL) ER tablet 25 mg   • oxyCODONE (IMM REL) (ROXICODONE) tablet 10 mg   • pravastatin (PRAVACHOL) tablet 40 mg   • prochlorperazine (COMPAZINE) tablet 5 mg   • zolpidem (AMBIEN) tablet 5 mg   • sodium chloride (PF) 0.9 % injection 2 mL   • sodium chloride (PF) 0.9 % injection 2 mL   • potassium chloride (KLOR-CON M) jo ann ER tablet 20 mEq   • potassium chloride (KLOR-CON) packet 20 mEq   • potassium chloride 20 mEq/100mL IVPB premix   • potassium chloride (KLOR-CON M) jo ann ER tablet 40 mEq   • potassium chloride (KLOR-CON) packet  40 mEq   • potassium chloride 20 mEq/100mL IVPB premix   • magnesium sulfate 1 g in dextrose 5% 100 mL IVPB premix   • magnesium sulfate 2 g in 50 mL premix IVPB   • magnesium sulfate 2 g in 50 mL premix IVPB   • sodium chloride 0.9 % flush bag 500 mL   • ondansetron (ZOFRAN) injection 4 mg   • polyethylene glycol (GLYCOLAX, MIRALAX) packet 17 g   • docusate sodium-sennosides (SENOKOT S) 50-8.6 MG 2 tablet   • albuterol-ipratropium 2.5 mg/0.5 mg (DUONEB) nebulizer solution 3 mL   • mupirocin (BACTROBAN) 2 % ointment 1 application   • mupirocin (BACTROBAN) 2 % ointment 1 application   • lidocaine (LIDOCARE) 4 % patch 1 patch        ALLERGIES:  Contrast media; Sulfa antibiotics; and Morphine    OBJECTIVE:  Vital Signs:    Visit Vitals  /58 (BP Location: [facility] Position: Sitting)   Pulse 78   Temp 98.4 °F (36.9 °C) (Oral)   Resp 18   Ht 5' 4\" (1.626 m)   Wt 63.4 kg   SpO2 91%   BMI 23.99 kg/m²         Physical Exam:  General appearance: Appears stated age, Alert, well-developed, well-nourished, oriented to person, place, time and situation, in no distress and cooperative  Head:   normocephalic without obvious abnormality  Mouth:   mucous membranes moist  Pulmonary: oxygen dependent     Left lateral ankle with viable granular tissue wound bed no palpable bone, purulence, fluctuance or malodor. There was small amount of dry crusts at outer perimeter. No local signs of infection.  No lower leg edema, pedal pulse 2/4.             Wound Bed Quality:  Granulation tissue and Fibrin      Roya-wound Quality:    None    Additional Descriptors:  none    Wound Measurements Per Flowsheet:    Wound Foot Left Medial Surgical incision (Active)   Number of days: 539       Wound Foot Left Surgical incision (Active)   Number of days: 539       Wound Heel Left Surgical incision (Active)   Number of days: 539     Wound Neck Right Anterior;Lower Puncture (Active)   Number of days: 154       Wound Ankle Left (Active)   Wound  Length (cm) 0.8 cm 3/26/2019  2:30 PM   Wound Width (cm) 0.8 cm 3/26/2019  2:30 PM   Wound Surface Area (cm^2) 0.64 cm^2 3/26/2019  2:30 PM   Number of days: 22         PROCEDURE:  None performed  Not indicated    Procedure was Performed by:  Not applicable    Laboratory assessments reviewed:  No results found for: PAB   Albumin (g/dL)   Date Value   03/26/2019 2.7 (L)   02/04/2019 3.0 (L)   01/07/2019 3.1 (L)      No results available in last 24 hours    Lab Results   Component Value Date    WBC 8.7 03/31/2019    GLUCOSE 122 (H) 03/31/2019    CRP <0.3 12/10/2018    RESR 37 (H) 12/10/2018    CREATININE 1.54 (H) 03/31/2019    GFRA 38 03/31/2019    GFRNA 32 03/31/2019        Culture results:  Specimen Description (no units)   Date Value   03/28/2019 BRONCHIAL BRUSH RUL   03/28/2019 BRONCHIAL BRUSH RUL   03/28/2019 BRONCHOALVEOLAR LAVAGE  RUL   03/28/2019 BRONCHOALVEOLAR LAVAGE  RUL   03/28/2019 BRONCHOALVEOLAR LAVAGE  RUL   03/28/2019 BRONCHOALVEOLAR LAVAGE  RUL    CULTURE (no units)   Date Value   03/28/2019 PENDING   03/28/2019 PENDING   03/28/2019 NO GROWTH 2 DAYS.   03/28/2019 PENDING   03/28/2019 PENDING        Diagnostic Assessments Reviewed:  X -ray (s)      3/29/19 left foot.     1. No osseous erosions identified. For definitive assessment consider MRI  with and without contrast with metal artifact reduction sequences.  2. Postsurgical changes to the foot.  3. No acute osseous abnormality.    Nutritional Assessment:  Prealbumin and/or Albumin reviewed    Wound treatment goals are palliative:  No    DIAGNOSES:  Traumatic wound left lateral ankle   Context issues: reactive airway disease     Medical Decision Making:   Left lateral ankle wound present for ~5 weeks after scraping her leg at home. No current treatment. No history of DM. Does have reactive airway disease and hypoxia can definitely play a role compromising wound healing ability.     Local wound care:wash with soap and water daily and then place  Bactroban to open wound and cover with gauze and tape.  May change as needed.     Vascular: Could consider vascular consultation if wound does not make forward progress.     Imaging: Xray without definitve osteomyelitis.       Infectious diseases: No local signs of infection.  Xray not definitive for osteomyelitis., Dr. Costa following, on ceftriaxone.      Nutrition: Encourage protein rich diet to promote wound healing.    Offloading: try to avoid pressure to the left lateral ankle.      We will follow, thanks     Plan of Care:  Advanced Wound Care Recommendations:  See above  Percent Wound Closure from consult: na  Care plan to augment wound closure:  Not applicable.  consult 3/26/19    Patient stable. All questions were answered.    Migue MURPHY   [facility]

## 2023-10-10 ENCOUNTER — TRANSFERRED RECORDS (OUTPATIENT)
Dept: HEALTH INFORMATION MANAGEMENT | Facility: CLINIC | Age: 23
End: 2023-10-10
Payer: COMMERCIAL

## 2023-10-12 ENCOUNTER — TRANSFERRED RECORDS (OUTPATIENT)
Dept: HEALTH INFORMATION MANAGEMENT | Facility: CLINIC | Age: 23
End: 2023-10-12

## 2023-10-24 ENCOUNTER — TRANSFERRED RECORDS (OUTPATIENT)
Dept: HEALTH INFORMATION MANAGEMENT | Facility: CLINIC | Age: 23
End: 2023-10-24
Payer: COMMERCIAL

## 2023-12-18 ENCOUNTER — TRANSFERRED RECORDS (OUTPATIENT)
Dept: HEALTH INFORMATION MANAGEMENT | Facility: CLINIC | Age: 23
End: 2023-12-18
Payer: COMMERCIAL

## 2023-12-22 ENCOUNTER — OFFICE VISIT (OUTPATIENT)
Dept: ORTHOPEDICS | Facility: CLINIC | Age: 23
End: 2023-12-22
Payer: COMMERCIAL

## 2023-12-22 VITALS — WEIGHT: 127 LBS | BODY MASS INDEX: 22.5 KG/M2 | HEIGHT: 63 IN

## 2023-12-22 DIAGNOSIS — Z98.890 S/P SHOULDER SURGERY: Primary | ICD-10-CM

## 2023-12-22 PROCEDURE — 99213 OFFICE O/P EST LOW 20 MIN: CPT | Performed by: ORTHOPAEDIC SURGERY

## 2023-12-22 NOTE — LETTER
12/22/2023         RE: Leonela Mclean  [address] [address]        Dear Colleague,    Thank you for referring your patient, Leonela Mclean, to the [facility] [place]. Please see a copy of my visit note below.    Chief Complaint:  Postoperative visit, right shoulder    Date of Surgery:  01/04/2023    History of Present Illness:  Leonela is a 23-year-old female who is now almost 1 year status post right shoulder arthroscopic labral repair.  She continues to improve.  Ranks her shoulder as a 75 out of 100.  Her shoulder feels stable.  She continues to have some slight anterior shoulder pain.  She points to her biceps.    Physical Examination:  23-year-old female alert oriented no apparent distress.  Range of motion 180/45/90/30.  This compares to 180/45/90/40 on the contralateral shoulder.  Rotator cuff strength is 5/5.  She has negative apprehension.  Negative posterior apprehension.  She is tender in the bicipital groove.    Impression:  1 year status post right shoulder arthroscopic labral repair.  Overall Leonela is doing well.  Her shoulder is stable.  She does have some biceps irritation.  In addition her posterior capsule remains a bit tight however slightly improved.  We had a discussion about her biceps.  We discussed a corticosteroid injection.  At this point we will wait and see how she does over the next few months.    Plan:  Continue to work on posterior capsule stretching.  Advance activity as tolerated.  Consider intra-articular corticosteroid injection if biceps tendinitis persists  Follow-up with me in 1 year for for routine recheck.  20 minutes was spent on the date of the encounter doing chart review, patient visit, and documentation         Emiliano Alejandre MD
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
## 2024-04-09 ENCOUNTER — TELEPHONE (OUTPATIENT)
Dept: FAMILY MEDICINE | Facility: CLINIC | Age: 24
End: 2024-04-09

## 2024-04-09 ENCOUNTER — TELEPHONE (OUTPATIENT)
Dept: NURSING | Facility: CLINIC | Age: 24
End: 2024-04-09

## 2024-04-09 DIAGNOSIS — Z00.00 VISIT FOR PREVENTIVE HEALTH EXAMINATION: Primary | ICD-10-CM

## 2024-04-09 DIAGNOSIS — Z13.9 SCREENING FOR CONDITION: ICD-10-CM

## 2024-04-09 DIAGNOSIS — Z11.1 SCREENING EXAMINATION FOR PULMONARY TUBERCULOSIS: Primary | ICD-10-CM

## 2024-04-09 NOTE — TELEPHONE ENCOUNTER
No, she already had Tdap in 2023 which includes the Td and does not need to be updated for 10 years or sooner if pregnant; see other encounter from today. I can review at her upcoming physical if she has more questions

## 2024-04-09 NOTE — TELEPHONE ENCOUNTER
Patient is scheduled to get a DTaP vaccine next Tues and is calling to ask if she can get the TD vaccine only.    Please call patient at [phone]. Ok to leave a message      Karen Dillard RN  04/09/24 3:52 PM  [facility] Nurse Advisor

## 2024-04-09 NOTE — TELEPHONE ENCOUNTER
Patient is coming to [facility] today for a TB test for school.     Please place order if appropriate.     Patient is also requesting for an updated TDAP. States she didn't get one in 2023 and has been over 10 years.

## 2024-04-09 NOTE — TELEPHONE ENCOUNTER
I can place orders for TB skin test (or she can have quant gold done instead which reduced need to return to have skin test read; this order is in if she chooses instead. )     Pt had Tdap 2/28/2023. Does not need another booster.

## 2024-04-09 NOTE — TELEPHONE ENCOUNTER
I will add Hep B antibody level and also routine preventative health labs so she can do these on 4/16- looks like she has preventative visit with me on 4/19 to confirm she plans to keep would be great.

## 2024-04-09 NOTE — TELEPHONE ENCOUNTER
Order/Referral Request    Who is requesting: pt    Orders being requested: Hep B Anitbody service     Reason service is needed/diagnosis: School    When are orders needed by: asap by 4/16 to get it done at the same time    Has this been discussed with Provider: No    Does patient have a preference on a Group/Provider/Facility? [facility]    Does patient have an appointment scheduled?: No    Where to send orders: Place orders within Epic    Could we send this information to you in [facility] or would you prefer to receive a phone call?:   Patient would prefer a phone call   Okay to leave a detailed message?: Yes at Cell number on file:    Telephone Information:   Mobile [phone]

## 2024-04-16 ENCOUNTER — LAB (OUTPATIENT)
Dept: LAB | Facility: CLINIC | Age: 24
End: 2024-04-16
Payer: COMMERCIAL

## 2024-04-16 ENCOUNTER — ALLIED HEALTH/NURSE VISIT (OUTPATIENT)
Dept: FAMILY MEDICINE | Facility: CLINIC | Age: 24
End: 2024-04-16
Payer: COMMERCIAL

## 2024-04-16 DIAGNOSIS — Z11.1 SCREENING EXAMINATION FOR PULMONARY TUBERCULOSIS: Primary | ICD-10-CM

## 2024-04-16 DIAGNOSIS — N91.1 SECONDARY AMENORRHEA: ICD-10-CM

## 2024-04-16 DIAGNOSIS — Z00.00 VISIT FOR PREVENTIVE HEALTH EXAMINATION: ICD-10-CM

## 2024-04-16 DIAGNOSIS — Z13.9 SCREENING FOR CONDITION: ICD-10-CM

## 2024-04-16 LAB
HBA1C MFR BLD: 5.1 % (ref 0–5.6)
HGB BLD-MCNC: 12.1 G/DL (ref 11.7–15.7)

## 2024-04-16 PROCEDURE — 36415 COLL VENOUS BLD VENIPUNCTURE: CPT

## 2024-04-16 PROCEDURE — 83002 ASSAY OF GONADOTROPIN (LH): CPT

## 2024-04-16 PROCEDURE — 86706 HEP B SURFACE ANTIBODY: CPT

## 2024-04-16 PROCEDURE — 99207 PR NO CHARGE NURSE ONLY: CPT

## 2024-04-16 PROCEDURE — 82670 ASSAY OF TOTAL ESTRADIOL: CPT

## 2024-04-16 PROCEDURE — 84443 ASSAY THYROID STIM HORMONE: CPT

## 2024-04-16 PROCEDURE — 85018 HEMOGLOBIN: CPT

## 2024-04-16 PROCEDURE — 86580 TB INTRADERMAL TEST: CPT

## 2024-04-16 PROCEDURE — 83001 ASSAY OF GONADOTROPIN (FSH): CPT

## 2024-04-16 PROCEDURE — 80061 LIPID PANEL: CPT

## 2024-04-16 PROCEDURE — 84146 ASSAY OF PROLACTIN: CPT

## 2024-04-16 PROCEDURE — 83036 HEMOGLOBIN GLYCOSYLATED A1C: CPT

## 2024-04-16 NOTE — PROGRESS NOTES
Patient presents for TB placement. Placement done successfully see immunizations for administration details.

## 2024-04-17 LAB
CHOLEST SERPL-MCNC: 184 MG/DL
FASTING STATUS PATIENT QL REPORTED: YES
HDLC SERPL-MCNC: 81 MG/DL
LDLC SERPL CALC-MCNC: 93 MG/DL
NONHDLC SERPL-MCNC: 103 MG/DL
TRIGL SERPL-MCNC: 48 MG/DL

## 2024-04-18 ENCOUNTER — ALLIED HEALTH/NURSE VISIT (OUTPATIENT)
Dept: FAMILY MEDICINE | Facility: CLINIC | Age: 24
End: 2024-04-18
Payer: COMMERCIAL

## 2024-04-18 DIAGNOSIS — Z11.1 SCREENING EXAMINATION FOR PULMONARY TUBERCULOSIS: Primary | ICD-10-CM

## 2024-04-18 LAB
HBV SURFACE AB SERPL IA-ACNC: 42.6 M[IU]/ML
HBV SURFACE AB SERPL IA-ACNC: REACTIVE M[IU]/ML
PPDINDURATION: 0 MM (ref 0–4.99)
PPDREDNESS: NORMAL

## 2024-04-18 PROCEDURE — 99207 PR NO CHARGE NURSE ONLY: CPT

## 2024-04-18 SDOH — HEALTH STABILITY: PHYSICAL HEALTH: ON AVERAGE, HOW MANY MINUTES DO YOU ENGAGE IN EXERCISE AT THIS LEVEL?: 60 MIN

## 2024-04-18 SDOH — HEALTH STABILITY: PHYSICAL HEALTH: ON AVERAGE, HOW MANY DAYS PER WEEK DO YOU ENGAGE IN MODERATE TO STRENUOUS EXERCISE (LIKE A BRISK WALK)?: 6 DAYS

## 2024-04-18 ASSESSMENT — SOCIAL DETERMINANTS OF HEALTH (SDOH): HOW OFTEN DO YOU GET TOGETHER WITH FRIENDS OR RELATIVES?: THREE TIMES A WEEK

## 2024-04-18 NOTE — PROGRESS NOTES
"Patient is here today for a Mantoux (TST) test results.    Did patient return to clinic 48-72 hours from Mantoux (TST) placement: Yes    PPD Induration   Date Value Ref Range Status   04/18/2024 0 0 - 4.99 mm Corrected     No results found for: \"PPDREDNESS\"    Induration Size? Induration <5mm - Enter results in Enter/Edit Activity. Route results to ordering provider.     Patient needs form signed? No    Patient reports having previously had the BCG Vaccine: No    Does patient need a two step? No              "

## 2024-04-19 ENCOUNTER — OFFICE VISIT (OUTPATIENT)
Dept: FAMILY MEDICINE | Facility: CLINIC | Age: 24
End: 2024-04-19
Payer: COMMERCIAL

## 2024-04-19 ENCOUNTER — DOCUMENTATION ONLY (OUTPATIENT)
Dept: FAMILY MEDICINE | Facility: CLINIC | Age: 24
End: 2024-04-19

## 2024-04-19 VITALS
DIASTOLIC BLOOD PRESSURE: 60 MMHG | TEMPERATURE: 97.7 F | BODY MASS INDEX: 22.79 KG/M2 | HEIGHT: 63 IN | SYSTOLIC BLOOD PRESSURE: 90 MMHG | HEART RATE: 41 BPM | WEIGHT: 128.6 LBS | OXYGEN SATURATION: 100 %

## 2024-04-19 DIAGNOSIS — R39.15 URINARY URGENCY: ICD-10-CM

## 2024-04-19 DIAGNOSIS — N91.1 SECONDARY AMENORRHEA: ICD-10-CM

## 2024-04-19 DIAGNOSIS — R00.1 SINUS BRADYCARDIA: ICD-10-CM

## 2024-04-19 DIAGNOSIS — D70.9 NEUTROPENIA, UNSPECIFIED TYPE (H): ICD-10-CM

## 2024-04-19 DIAGNOSIS — Z00.00 ROUTINE GENERAL MEDICAL EXAMINATION AT A HEALTH CARE FACILITY: Primary | ICD-10-CM

## 2024-04-19 PROBLEM — S43.431D BANKART LESION OF RIGHT SHOULDER, SUBSEQUENT ENCOUNTER: Status: RESOLVED | Noted: 2023-01-09 | Resolved: 2024-04-19

## 2024-04-19 LAB
ALBUMIN UR-MCNC: NEGATIVE MG/DL
APPEARANCE UR: CLEAR
BILIRUB UR QL STRIP: NEGATIVE
COLOR UR AUTO: YELLOW
ESTRADIOL SERPL-MCNC: 23 PG/ML
FSH SERPL IRP2-ACNC: 7.4 MIU/ML
GLUCOSE UR STRIP-MCNC: NEGATIVE MG/DL
HGB UR QL STRIP: NEGATIVE
KETONES UR STRIP-MCNC: NEGATIVE MG/DL
LEUKOCYTE ESTERASE UR QL STRIP: NEGATIVE
LH SERPL-ACNC: 6.3 MIU/ML
NITRATE UR QL: NEGATIVE
PH UR STRIP: 8.5 [PH] (ref 5–8)
PROLACTIN SERPL 3RD IS-MCNC: 6 NG/ML (ref 5–23)
SP GR UR STRIP: 1.01 (ref 1–1.03)
TSH SERPL DL<=0.005 MIU/L-ACNC: 2.45 UIU/ML (ref 0.3–4.2)
UROBILINOGEN UR STRIP-ACNC: 0.2 E.U./DL

## 2024-04-19 PROCEDURE — 81003 URINALYSIS AUTO W/O SCOPE: CPT | Performed by: FAMILY MEDICINE

## 2024-04-19 PROCEDURE — 99213 OFFICE O/P EST LOW 20 MIN: CPT | Mod: 25 | Performed by: FAMILY MEDICINE

## 2024-04-19 PROCEDURE — 99395 PREV VISIT EST AGE 18-39: CPT | Mod: 25 | Performed by: FAMILY MEDICINE

## 2024-04-19 PROCEDURE — 93242 EXT ECG>48HR<7D RECORDING: CPT | Performed by: FAMILY MEDICINE

## 2024-04-19 RX ORDER — MEDROXYPROGESTERONE ACETATE 10 MG
10 TABLET ORAL DAILY
Qty: 10 TABLET | Refills: 0 | Status: SHIPPED | OUTPATIENT
Start: 2024-04-19 | End: 2024-04-29

## 2024-04-19 NOTE — PROGRESS NOTES
Hello,     I just called to patient to let them know that the Testosterone test that was ordered today will not be able to be added onto the specimens that were drawn on April 16 th,2024. According to the lab directory, this test needs to be drawn into a red/no gel tube and there are no alternative containers listed. I asked the patient if they could make a lab only appointment to have this test drawn. The patient had asked if I would reach out to the provider about how necessary the test is.     Could this patient please receive a call back from someone on whether the patient should make another appointment to have this test drawn?     Thank you,   KENNETH Dotson

## 2024-04-19 NOTE — PROGRESS NOTES
Leonela Mclean arrived here on 4/19/2024 10:42 AM for 3-7 Days  Zio monitor placement per ordering provider Franny Porter MD for the diagnosis Sinus Bradycardia.  Patient s skin was prepped per protocol.  Zio monitor was placed.  Instructions were reviewed with and given to the patient.  Patient verbalized understanding of wear, troubleshooting and monitor return instructions.   Miladis Gallardo LPN

## 2024-04-19 NOTE — PROGRESS NOTES
Preventive Care Visit  [facility]  Franny Porter MD, Family Medicine  Apr 19, 2024      Assessment & Plan     Routine general medical examination at a health care facility  Overall has been doing well from her perspective  Not sexually active yet; declines pap  Declines need for OCP  Labs from 4/16 reviewed    Secondary amenorrhea  She feels she has been fully weight restored in the past 2ish months; weight at physical in Sept showed just 1.5lb less however. Hx of orthorexia and had to stop cheerleading in college (followed by sports med for that timeframe). No period since our last visit in 2019 (at that time weight 116lbs and was training for marathon). Will add on labs to 4/16 sample as able; Tsh has been followed/normal.   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 Follow up in 3 months if no period    - TSH with free T4 reflex; Future  - Prolactin; Future  - US Pelvis Complete without Transvaginal; Future  - medroxyPROGESTERone (PROVERA) 10 MG tablet; Take 1 tablet (10 mg) by mouth daily for 10 days  - ESTRADIOL; Future  - FOLLICLE STIMULATING HORMONE; Future  - Luteinizing Hormone; Future  - TESTOSTERONE TOTAL; Future  - DHEA SULFATE; Future    Sinus bradycardia  Has had EKG and Echo; longstanding bradycardia preexisting any weight changes; generally not symptomatic. Advised zio patch monitor for 1 week  - ZIO PATCH 3-7 DAYS (additional cost to patient)  - ZIO PATCH 3-7 DAYS APPLICATION    Urinary urgency  Suspect overactive bladder and offered pelvic floor therapy (hx vaginismus and has been encouraged for this too; no hx of sexual trauma)  - UA Macroscopic with reflex to Microscopic and Culture - Lab Collect    Neutropenia, unspecified type (H24)  Normalized on Sept labs; hx of mother with benign neutropenia              Counseling  Appropriate preventive services were discussed with this patient, including  "applicable screening as appropriate for fall prevention, nutrition, physical activity, Tobacco-use cessation, weight loss and cognition.  Checklist reviewing preventive services available has been given to the patient.  Reviewed patient's diet, addressing concerns and/or questions.           Subjective   Leonela is a 24 year old, presenting for the following:  Physical (Annual px. )         Health Care Directive  Patient does not have a Health Care Directive or Living Will: Discussed advance care planning with patient; information given to patient to review.    HPI    Chief Complaint   Patient presents with    Physical     Annual px.        Last saw me about 3 yr ago.  Had her labs done 4/16    Has had a right shoulder surgery Jan 2023 to \"clean it out\"    Hx vaginismus; not yet sexually active      Since that time has been seen with GI for gluten intolerance  EGD 12/2023- nongluten sensitivity; no celiac    No LMP recorded. (Menstrual status: Amenorrhea).   She still hasn't a period in the past 3 years but has just been \"weight restored\" in the past few months. Hoping period will return in a few months now.   Cheering in college and sidelined and followed by sports med docs, she was dx with an eating disorder but Leonela felt she was eating healthfully but more just working out more for college sports; she felt it was more orthorexia.   Not tracking calories  She had an EKG and echo done due to the amenorrhea, bradycardia (for years), and fatigue    Bradycardia: gets a notification from her watch if HR below 40 for more than 10min; 7-8 times at night when she wears it; during the day when she is sitting on the couch might get 1-2 notifications in a day; other days without any    Thyroid levels have been normal at her physical recently    Wt Readings from Last 3 Encounters:   04/19/24 58.3 kg (128 lb 9.6 oz)   12/22/23 57.6 kg (127 lb)  (approx weight reported by pt; doesn't recall actual weight)   09/15/23 57.6 kg " (127 lb)  at an office visit     Social History     Social History Narrative    [employer] degree in kinesiology    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, interest in ortho. Scribing at the [employer]    Not in a relationship    Has done a 1/2 marathon in the past    Daily work outs 5-6d/wk; mix of running and strength    Never smoker; minimal alcohol    Franny Porter MD                    4/18/2024   General Health   How would you rate your overall physical health? Good   Feel stress (tense, anxious, or unable to sleep) To some extent   (!) STRESS CONCERN      4/18/2024   Nutrition   Three or more servings of calcium each day? (!) I DON'T KNOW   Diet: Gluten-free/reduced   How many servings of fruit and vegetables per day? 4 or more   How many sweetened beverages each day? 0-1         4/18/2024   Exercise   Days per week of moderate/strenous exercise 6 days   Average minutes spent exercising at this level 60 min         4/18/2024   Social Factors   Frequency of gathering with friends or relatives Three times a week   Worry food won't last until get money to buy more No   Food not last or not have enough money for food? No   Do you have housing?  Yes   Are you worried about losing your housing? No   Lack of transportation? No   Unable to get utilities (heat,electricity)? No         4/18/2024   Dental   Dentist two times every year? Yes            Today's PHQ-2 Score:       4/18/2024     8:10 AM   PHQ-2 ( 1999 [employer])   Q1: Little interest or pleasure in doing things 0   Q2: Feeling down, depressed or hopeless 0   PHQ-2 Score 0   Q1: Little interest or pleasure in doing things Not at all   Q2: Feeling down, depressed or hopeless Not at all   PHQ-2 Score 0           4/18/2024   Substance Use   Alcohol more than 3/day or more than 7/wk Not Applicable   Do you use any other substances recreationally? No     Social History     Tobacco Use    Smoking status: Never     Passive exposure: Never    Smokeless tobacco: Never  "  Vaping Use    Vaping status: Never Used   Substance Use Topics    Alcohol use: Not Currently     Comment: occasional    Drug use: Never           4/18/2024   STI Screening   New sexual partner(s) since last STI/HIV test? No     History of abnormal Pap smear: NO - age 21-29 PAP every 3 years recommended             4/18/2024   Contraception/Family Planning   Questions about contraception or family planning (!) YES - amenorrhea currently       Reviewed and updated as needed this visit by Provider   Tobacco  Allergies  Meds  Problems  Med Hx  Surg Hx  Fam Hx                     Objective    Exam  BP 90/60 (BP Location: Left arm, Patient Position: Sitting, Cuff Size: Adult Regular)   Pulse (!) 41   Temp 97.7  F (36.5  C) (Temporal)   Ht 1.594 m (5' 2.75\")   Wt 58.3 kg (128 lb 9.6 oz)   SpO2 100%   BMI 22.96 kg/m     Estimated body mass index is 22.96 kg/m  as calculated from the following:    Height as of this encounter: 1.594 m (5' 2.75\").    Weight as of this encounter: 58.3 kg (128 lb 9.6 oz).    Physical Exam  GENERAL: alert and no distress  EYES: Eyes grossly normal to inspection, PERRL and conjunctivae and sclerae normal  HENT: ear canals and TM's normal, nose and mouth without ulcers or lesions  NECK: no adenopathy, no asymmetry, masses, or scars  RESP: lungs clear to auscultation - no rales, rhonchi or wheezes  CV: regular rate and rhythm, normal S1 S2, no S3 or S4, no murmur, click or rub, no peripheral edema  ABDOMEN: soft, nontender, no hepatosplenomegaly, no masses and bowel sounds normal   (female): declines  MS: no gross musculoskeletal defects noted, no edema  SKIN: no suspicious lesions or rashes  NEURO: Normal strength and tone, mentation intact and speech normal  PSYCH: mentation appears normal, affect normal/bright        Signed Electronically by: Franny Porter MD    "

## 2024-04-19 NOTE — PATIENT INSTRUCTIONS
Look into pelvic floor therapy in [place] for bladder sensitivity and vaginismus     Try the progesterone pill when ready to have a withdrawal cycle

## 2024-04-23 ENCOUNTER — ALLIED HEALTH/NURSE VISIT (OUTPATIENT)
Dept: FAMILY MEDICINE | Facility: CLINIC | Age: 24
End: 2024-04-23
Payer: COMMERCIAL

## 2024-04-23 DIAGNOSIS — Z11.1 VISIT FOR MANTOUX TEST: Primary | ICD-10-CM

## 2024-04-23 PROCEDURE — 86580 TB INTRADERMAL TEST: CPT

## 2024-04-23 PROCEDURE — 99207 PR NO CHARGE NURSE ONLY: CPT

## 2024-04-23 NOTE — PROGRESS NOTES
Is there a current order in the chart? Yes    Reason for Mantoux (TST) in patient's own words: School    Patient needs form signed? No - form not needed per patient.    Instructed patient to wait for 15 minutes post injection and to report any reactions immediately to staff.    Told patient to return to clinic in 48-72 hours to have Mantoux (TST) read. 2 step       Peyman IAN Roa CMA

## 2024-04-25 ENCOUNTER — ALLIED HEALTH/NURSE VISIT (OUTPATIENT)
Dept: FAMILY MEDICINE | Facility: CLINIC | Age: 24
End: 2024-04-25
Payer: COMMERCIAL

## 2024-04-25 DIAGNOSIS — Z11.1 VISIT FOR MANTOUX TEST: Primary | ICD-10-CM

## 2024-04-25 LAB
PPDINDURATION: 0 MM (ref 0–4.99)
PPDREDNESS: NORMAL

## 2024-04-25 PROCEDURE — 99207 PR NO CHARGE NURSE ONLY: CPT

## 2024-04-25 NOTE — PROGRESS NOTES
Patient is here today for a Mantoux (TST) test results.    Did patient return to clinic 48-72 hours from Mantoux (TST) placement: Yes -     PPD Induration   Date Value Ref Range Status   04/25/2024 0 0 - 4.99 mm Final     PPD Redness   Date Value Ref Range Status   04/25/2024 Not Present  Final           Induration Size? Induration <5mm - Enter results in Enter/Edit Activity. Route results to ordering provider.     Patient needs form signed? Yes. Follow clinic form process.     Patient reports having previously had the BCG Vaccine: No    Does patient need a two step? No

## 2024-04-25 NOTE — LETTER
[facility]  [address] [id number] [phone]          4/25/2024          To Whom it May Concern:     Leonela Mclean, female, 2000 has had a mantoux on 4/23/24.      Mantoux result is NEGATIVE:  Lab Results   Component Value Date    PPDREDNESS Not Present 04/25/2024    PPDINDURATIO 0 04/25/2024         Please contact me for questions or concerns.        Sincerely,    ANANDA RAND/SHADYN

## 2024-05-08 PROCEDURE — 93244 EXT ECG>48HR<7D REV&INTERPJ: CPT | Performed by: INTERNAL MEDICINE

## 2024-05-09 ENCOUNTER — TELEPHONE (OUTPATIENT)
Dept: FAMILY MEDICINE | Facility: CLINIC | Age: 24
End: 2024-05-09
Payer: COMMERCIAL

## 2024-05-09 NOTE — TELEPHONE ENCOUNTER
Called pt and left brief VM that I would send via results message for her zio patch; offering cardiology referral if she calls back for this which I can place on Monday when I'm back in clinic if desired

## 2024-05-09 NOTE — TELEPHONE ENCOUNTER
Test Results    Who ordered the test:  Dr Porter    Type of test: Zio Patch    Test results reported by [employer]: reference number [id number]    3 symptomatic bradycardia episodes    1.   04/22/2024 at 1027  35 bpm lasting 30 seconds  Found on report page 4, strip 1    2.  04/20/2024 at 2144  37 bpm lasting 30 second  Found on page 8, strip 6    3.  04/22/2024 at 0712  33 bpm lasting 30 seconds  Found on page 8, strip 7     Report is available for viewing.    Krys Diamond RN

## 2024-05-28 ENCOUNTER — MYC MEDICAL ADVICE (OUTPATIENT)
Dept: FAMILY MEDICINE | Facility: CLINIC | Age: 24
End: 2024-05-28
Payer: COMMERCIAL

## 2024-05-28 DIAGNOSIS — F41.8 SITUATIONAL ANXIETY: Primary | ICD-10-CM

## 2024-05-28 PROCEDURE — 99442 PR PHYSICIAN TELEPHONE EVALUATION 11-20 MIN: CPT | Performed by: FAMILY MEDICINE

## 2024-05-28 NOTE — TELEPHONE ENCOUNTER
Dr. Porter are you able to work patient in? Looks like your next available virtual visits were 6/25/24.

## 2024-05-30 RX ORDER — FLUOXETINE 10 MG/1
10 CAPSULE ORAL DAILY
Qty: 90 CAPSULE | Refills: 1 | Status: SHIPPED | OUTPATIENT
Start: 2024-05-30

## 2024-05-30 RX ORDER — HYDROXYZINE HYDROCHLORIDE 10 MG/1
10-30 TABLET, FILM COATED ORAL 3 TIMES DAILY PRN
Qty: 60 TABLET | Refills: 3 | Status: SHIPPED | OUTPATIENT
Start: 2024-05-30

## 2024-05-30 NOTE — TELEPHONE ENCOUNTER
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 Has always had some minor appropriate test anxiety  She is open to a medication at this point.     She says she calls her mom most days and crying a lot.   Got 98%, 100% on her first exams. Very stressed prior to the tests  The challenge is they have weekly/constant exams    Her brother took Fluoxetine and worked well for him- she's not sure though if it was more anxiety or depression

## 2024-07-03 NOTE — TELEPHONE ENCOUNTER
Patient calling for general information, no triage needed.  Marylu Duff RN [place] [facility]       Casandra: Heme consulted. Recommending workup, ceftriaxone, admission. received sign out from Dr. 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 continues to have fever, pt admitted to [facility]. repeat labs, ctx given. will f/u labs. Qasim Edwards MD Attending

## 2024-08-14 ENCOUNTER — APPOINTMENT (OUTPATIENT)
Dept: URBAN - METROPOLITAN AREA CLINIC 260 | Age: 24
Setting detail: DERMATOLOGY
End: 2024-08-14

## 2024-08-14 VITALS — WEIGHT: 130 LBS | HEIGHT: 63 IN

## 2024-08-14 DIAGNOSIS — L70.0 ACNE VULGARIS: ICD-10-CM

## 2024-08-14 PROBLEM — D23.39 OTHER BENIGN NEOPLASM OF SKIN OF OTHER PARTS OF FACE: Status: ACTIVE | Noted: 2024-08-14

## 2024-08-14 PROCEDURE — OTHER ADDITIONAL NOTES: OTHER

## 2024-08-14 PROCEDURE — 99213 OFFICE O/P EST LOW 20 MIN: CPT

## 2024-08-14 PROCEDURE — OTHER PRESCRIPTION: OTHER

## 2024-08-14 PROCEDURE — OTHER COUNSELING: OTHER

## 2024-08-14 PROCEDURE — OTHER PRESCRIPTION MEDICATION MANAGEMENT: OTHER

## 2024-08-14 RX ORDER — CLINDAMYCIN PHOSPHATE 10 MG/G
GEL TOPICAL
Qty: 30 | Refills: 2 | Status: ERX | COMMUNITY
Start: 2024-08-14

## 2024-08-14 RX ORDER — TRETIONIN 0.25 MG/G
CREAM TOPICAL QHS
Qty: 45 | Refills: 2 | Status: ERX | COMMUNITY
Start: 2024-08-14

## 2024-08-14 NOTE — PROCEDURE: ADDITIONAL NOTES
Render Risk Assessment In Note?: no
Detail Level: Simple
Additional Notes: Discussed removal options but patient declines at this time.

## 2024-08-14 NOTE — PROCEDURE: COUNSELING
Erythromycin Pregnancy And Lactation Text: This medication is Pregnancy Category B and is considered safe during pregnancy. It is also excreted in breast milk.
Sarecycline Counseling: Patient advised regarding possible photosensitivity and discoloration of the teeth, skin, lips, tongue and gums.  Patient instructed to avoid sunlight, if possible.  When exposed to sunlight, patients should wear protective clothing, sunglasses, and sunscreen.  The patient was instructed to call the office immediately if the following severe adverse effects occur:  hearing changes, easy bruising/bleeding, severe headache, or vision changes.  The patient verbalized understanding of the proper use and possible adverse effects of sarecycline.  All of the patient's questions and concerns were addressed.
Aklief Pregnancy And Lactation Text: It is unknown if this medication is safe to use during pregnancy.  It is unknown if this medication is excreted in breast milk.  Breastfeeding women should use the topical cream on the smallest area of the skin for the shortest time needed while breastfeeding.  Do not apply to nipple and areola.
Birth Control Pills Counseling: Birth Control Pill Counseling: I discussed with the patient the potential side effects of OCPs including but not limited to increased risk of stroke, heart attack, thrombophlebitis, deep venous thrombosis, hepatic adenomas, breast changes, GI upset, headaches, and depression.  The patient verbalized understanding of the proper use and possible adverse effects of OCPs. All of the patient's questions and concerns were addressed.
Dapsone Counseling: I discussed with the patient the risks of dapsone including but not limited to hemolytic anemia, agranulocytosis, rashes, methemoglobinemia, kidney failure, peripheral neuropathy, headaches, GI upset, and liver toxicity.  Patients who start dapsone require monitoring including baseline LFTs and weekly CBCs for the first month, then every month thereafter.  The patient verbalized understanding of the proper use and possible adverse effects of dapsone.  All of the patient's questions and concerns were addressed.
Topical Retinoid counseling:  Patient advised to apply a pea-sized amount only at bedtime and wait 30 minutes after washing their face before applying.  If too drying, patient may add a non-comedogenic moisturizer. The patient verbalized understanding of the proper use and possible adverse effects of retinoids.  All of the patient's questions and concerns were addressed.
Topical Sulfur Applications Pregnancy And Lactation Text: This medication is Pregnancy Category C and has an unknown safety profile during pregnancy. It is unknown if this topical medication is excreted in breast milk.
Tetracycline Pregnancy And Lactation Text: This medication is Pregnancy Category D and not consider safe during pregnancy. It is also excreted in breast milk.
Topical Clindamycin Pregnancy And Lactation Text: This medication is Pregnancy Category B and is considered safe during pregnancy. It is unknown if it is excreted in breast milk.
High Dose Vitamin A Counseling: Side effects reviewed, pt to contact office should one occur.
Spironolactone Pregnancy And Lactation Text: This medication can cause feminization of the male fetus and should be avoided during pregnancy. The active metabolite is also found in breast milk.
Benzoyl Peroxide Counseling: Patient counseled that medicine may cause skin irritation and bleach clothing.  In the event of skin irritation, the patient was advised to reduce the amount of the drug applied or use it less frequently.   The patient verbalized understanding of the proper use and possible adverse effects of benzoyl peroxide.  All of the patient's questions and concerns were addressed.
Doxycycline Pregnancy And Lactation Text: This medication is Pregnancy Category D and not consider safe during pregnancy. It is also excreted in breast milk but is considered safe for shorter treatment courses.
Minocycline Counseling: Patient advised regarding possible photosensitivity and discoloration of the teeth, skin, lips, tongue and gums.  Patient instructed to avoid sunlight, if possible.  When exposed to sunlight, patients should wear protective clothing, sunglasses, and sunscreen.  The patient was instructed to call the office immediately if the following severe adverse effects occur:  hearing changes, easy bruising/bleeding, severe headache, or vision changes.  The patient verbalized understanding of the proper use and possible adverse effects of minocycline.  All of the patient's questions and concerns were addressed.
Include Pregnancy/Lactation Warning?: No
Bactrim Counseling:  I discussed with the patient the risks of sulfa antibiotics including but not limited to GI upset, allergic reaction, drug rash, diarrhea, dizziness, photosensitivity, and yeast infections.  Rarely, more serious reactions can occur including but not limited to aplastic anemia, agranulocytosis, methemoglobinemia, blood dyscrasias, liver or kidney failure, lung infiltrates or desquamative/blistering drug rashes.
Azithromycin Counseling:  I discussed with the patient the risks of azithromycin including but not limited to GI upset, allergic reaction, drug rash, diarrhea, and yeast infections.
Azelaic Acid Counseling: Patient counseled that medicine may cause skin irritation and to avoid applying near the eyes.  In the event of skin irritation, the patient was advised to reduce the amount of the drug applied or use it less frequently.   The patient verbalized understanding of the proper use and possible adverse effects of azelaic acid.  All of the patient's questions and concerns were addressed.
Tazorac Pregnancy And Lactation Text: This medication is not safe during pregnancy. It is unknown if this medication is excreted in breast milk.
Dapsone Pregnancy And Lactation Text: This medication is Pregnancy Category C and is not considered safe during pregnancy or breast feeding.
Topical Retinoid Pregnancy And Lactation Text: This medication is Pregnancy Category C. It is unknown if this medication is excreted in breast milk.
Winlevi Counseling:  I discussed with the patient the risks of topical clascoterone including but not limited to erythema, scaling, itching, and stinging. Patient voiced their understanding.
Birth Control Pills Pregnancy And Lactation Text: This medication should be avoided if pregnant and for the first 30 days post-partum.
Isotretinoin Counseling: Patient should get monthly blood tests, not donate blood, not drive at night if vision affected, not share medication, and not undergo elective surgery for 6 months after tx completed. Side effects reviewed, pt to contact office should one occur.
Topical Sulfur Applications Counseling: Topical Sulfur Counseling: Patient counseled that this medication may cause skin irritation or allergic reactions.  In the event of skin irritation, the patient was advised to reduce the amount of the drug applied or use it less frequently.   The patient verbalized understanding of the proper use and possible adverse effects of topical sulfur application.  All of the patient's questions and concerns were addressed.
Detail Level: Zone
Benzoyl Peroxide Pregnancy And Lactation Text: This medication is Pregnancy Category C. It is unknown if benzoyl peroxide is excreted in breast milk.
Tetracycline Counseling: Patient counseled regarding possible photosensitivity and increased risk for sunburn.  Patient instructed to avoid sunlight, if possible.  When exposed to sunlight, patients should wear protective clothing, sunglasses, and sunscreen.  The patient was instructed to call the office immediately if the following severe adverse effects occur:  hearing changes, easy bruising/bleeding, severe headache, or vision changes.  The patient verbalized understanding of the proper use and possible adverse effects of tetracycline.  All of the patient's questions and concerns were addressed. Patient understands to avoid pregnancy while on therapy due to potential birth defects.
Aklief counseling:  Patient advised to apply a pea-sized amount only at bedtime and wait 30 minutes after washing their face before applying.  If too drying, patient may add a non-comedogenic moisturizer.  The most commonly reported side effects including irritation, redness, scaling, dryness, stinging, burning, itching, and increased risk of sunburn.  The patient verbalized understanding of the proper use and possible adverse effects of retinoids.  All of the patient's questions and concerns were addressed.
Bactrim Pregnancy And Lactation Text: This medication is Pregnancy Category D and is known to cause fetal risk.  It is also excreted in breast milk.
Erythromycin Counseling:  I discussed with the patient the risks of erythromycin including but not limited to GI upset, allergic reaction, drug rash, diarrhea, increase in liver enzymes, and yeast infections.
High Dose Vitamin A Pregnancy And Lactation Text: High dose vitamin A therapy is contraindicated during pregnancy and breast feeding.
Azithromycin Pregnancy And Lactation Text: This medication is considered safe during pregnancy and is also secreted in breast milk.
Doxycycline Counseling:  Patient counseled regarding possible photosensitivity and increased risk for sunburn.  Patient instructed to avoid sunlight, if possible.  When exposed to sunlight, patients should wear protective clothing, sunglasses, and sunscreen.  The patient was instructed to call the office immediately if the following severe adverse effects occur:  hearing changes, easy bruising/bleeding, severe headache, or vision changes.  The patient verbalized understanding of the proper use and possible adverse effects of doxycycline.  All of the patient's questions and concerns were addressed.
Tazorac Counseling:  Patient advised that medication is irritating and drying.  Patient may need to apply sparingly and wash off after an hour before eventually leaving it on overnight.  The patient verbalized understanding of the proper use and possible adverse effects of tazorac.  All of the patient's questions and concerns were addressed.
Winlevi Pregnancy And Lactation Text: This medication is considered safe during pregnancy and breastfeeding.
Isotretinoin Pregnancy And Lactation Text: This medication is Pregnancy Category X and is considered extremely dangerous during pregnancy. It is unknown if it is excreted in breast milk.
Spironolactone Counseling: Patient advised regarding risks of diarrhea, abdominal pain, hyperkalemia, birth defects (for female patients), liver toxicity and renal toxicity. The patient may need blood work to monitor liver and kidney function and potassium levels while on therapy. The patient verbalized understanding of the proper use and possible adverse effects of spironolactone.  All of the patient's questions and concerns were addressed.
Topical Clindamycin Counseling: Patient counseled that this medication may cause skin irritation or allergic reactions.  In the event of skin irritation, the patient was advised to reduce the amount of the drug applied or use it less frequently.   The patient verbalized understanding of the proper use and possible adverse effects of clindamycin.  All of the patient's questions and concerns were addressed.
Azelaic Acid Pregnancy And Lactation Text: This medication is considered safe during pregnancy and breast feeding.
Detail Level: Detailed

## 2024-08-14 NOTE — HPI: ACNE (PATIENT REPORTED)
Additional Comments (Use Complete Sentences): Patient is currently using Cetaphil, clean and clear and oil of Olay products with no relief.

## 2024-08-14 NOTE — PROCEDURE: PRESCRIPTION MEDICATION MANAGEMENT
Initiate Treatment: clindamycin 1 % topical gel and tretinoin 0.025 % topical cream QHS
Detail Level: Zone
Render In Strict Bullet Format?: No

## 2024-10-31 ENCOUNTER — TELEPHONE (OUTPATIENT)
Dept: FAMILY MEDICINE | Facility: CLINIC | Age: 24
End: 2024-10-31
Payer: COMMERCIAL

## 2024-11-11 ENCOUNTER — MYC REFILL (OUTPATIENT)
Dept: FAMILY MEDICINE | Facility: CLINIC | Age: 24
End: 2024-11-11
Payer: COMMERCIAL

## 2024-11-11 DIAGNOSIS — F41.8 SITUATIONAL ANXIETY: ICD-10-CM

## 2024-11-12 RX ORDER — FLUOXETINE 10 MG/1
10 CAPSULE ORAL DAILY
Qty: 90 CAPSULE | Refills: 1 | Status: SHIPPED | OUTPATIENT
Start: 2024-11-12

## 2024-12-20 ENCOUNTER — OFFICE VISIT (OUTPATIENT)
Dept: ORTHOPEDICS | Facility: CLINIC | Age: 24
End: 2024-12-20
Payer: COMMERCIAL

## 2024-12-20 DIAGNOSIS — Z98.890 S/P SHOULDER SURGERY: Primary | ICD-10-CM

## 2024-12-20 PROCEDURE — 99213 OFFICE O/P EST LOW 20 MIN: CPT | Performed by: ORTHOPAEDIC SURGERY

## 2024-12-20 NOTE — LETTER
12/20/2024      Leonela Mclean  [address] [facility] [address]      Dear Colleague,    Thank you for referring your patient, Leonela Mclean, to the [facility] [place]. Please see a copy of my visit note below.    Chief Complaint:  Postoperative visit, right shoulder    Date of Surgery:  01/04/2023    History of Present Illness:  Leonela is a 24-year-old female with 2-year status post right shoulder arthroscopic labral repair.  She has noticed some random clicking and catching.  This does result in some discomfort.  Shoulder feels loose but she has had no instability events.  Overall she is doing reasonably well.    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  She is able to complete her necessary work with her shoulder as is.    Physical Examination:  24-year-old female alert oriented no apparent distress.  Has full and symmetrical range of motion.  Her rotator cuff strength is 5/5.  She has positive biceps groove tenderness but no AC joint tenderness.  Positive [place]'s.  Positive crank test.  She has a trace apprehension test no load-and-shift.    Impression:  2 years status post right shoulder arthroscopic labral repair.  I believe Leonela has an irritated biceps tendon.  Her superior labral tests are positive.  We discussed obtaining an MRI scan.  However at this time and he needs to focus on school and would not be in any position to take any action regarding her shoulder.  We did discuss corticosteroid injection but she would like to wait on this option.    Plan:  Continue a home-based rehabilitation program  To let me know if her symptoms worsen and we would consider obtaining a right shoulder MRI with intra-articular gadolinium to evaluate for superior labral tear    20 minutes was spent on the date of the encounter doing chart review, patient visit, and documentation       Again, thank you for allowing me to participate in the care of your patient.         Sincerely,        Emiliano Alejandre MD

## 2024-12-20 NOTE — PROGRESS NOTES
Chief Complaint:  Postoperative visit, right shoulder    Date of Surgery:  01/04/2023    History of Present Illness:  Leonela is a 24-year-old female with 2-year status post right shoulder arthroscopic labral repair.  She has noticed some random clicking and catching.  This does result in some discomfort.  Shoulder feels loose but she has had no instability events.  Overall she is doing reasonably well.    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    Physical Examination:  24-year-old female alert oriented no apparent distress.  Has full and symmetrical range of motion.  Her rotator cuff strength is 5/5.  She has positive biceps groove tenderness but no AC joint tenderness.  Positive [place]'s.  Positive crank test.  She has a trace apprehension test no load-and-shift.    Impression:  2 years status post right shoulder arthroscopic labral repair.  I believe Leonela has an irritated biceps tendon.  Her superior labral tests are positive.  We discussed obtaining an MRI scan.  However at this time and he needs to focus on school and would not be in any position to take any action regarding her shoulder.  We did discuss corticosteroid injection but she would like to wait on this option.    Plan:  Continue a home-based rehabilitation program  To let me know if her symptoms worsen and we would consider obtaining a right shoulder MRI with intra-articular gadolinium to evaluate for superior labral tear    20 minutes was spent on the date of the encounter doing chart review, patient visit, and documentation

## 2025-02-01 ENCOUNTER — MYC REFILL (OUTPATIENT)
Dept: FAMILY MEDICINE | Facility: CLINIC | Age: 25
End: 2025-02-01
Payer: COMMERCIAL

## 2025-02-01 DIAGNOSIS — F41.8 SITUATIONAL ANXIETY: ICD-10-CM

## 2025-02-04 RX ORDER — FLUOXETINE 10 MG/1
10 CAPSULE ORAL DAILY
Qty: 90 CAPSULE | Refills: 0 | Status: SHIPPED | OUTPATIENT
Start: 2025-02-04

## 2025-02-04 NOTE — TELEPHONE ENCOUNTER
2-4-25  Pt is scheduled 5-8.  RN's- pt stated she doesn't need the refills that were called in2-4, per pt after she sent clinic a msg that she needed refills pt noticed she already had 3 refills on file @ [facility].  Pt asking to cancel the refill request that was just called in today 2-4 as pt already  the 3 remaining on file on 2-1    Tanya

## 2025-05-06 ENCOUNTER — TELEPHONE (OUTPATIENT)
Dept: FAMILY MEDICINE | Facility: CLINIC | Age: 25
End: 2025-05-06
Payer: COMMERCIAL

## 2025-05-06 NOTE — TELEPHONE ENCOUNTER
"General Call~Future Appt Question    Contacts       Contact Date/Time Type Contact Phone/Fax    05/06/2025 03:22 PM CDT Phone (Incoming) Leonela Mclean (Self) [phone] (M)        Reason for Call:  Question about Preventative Adult appt scheduled on Thurs, 5/8/25    What are your questions or concerns:  Pt had an appt with an outside Gyn on 1/2/25. Her insurance company, [facility], informed Pt that she would not be able to have another preventative adult appt within a year. They suggested to Pt that the next appt should be scheduled under routine screening/physical exam. Writer suggested a \"workaround\" is to change appt status on 5/8 to offfice visit/med check but writer isn't sure of this due to us not having an OB Dept here in . Pls advise Pt.     Date of last appointment with provider: 4/19/24 Preventative Adult, Franny Porter MD    Could we send this information to you in [facility] or would you prefer to receive a phone call?:   Phone call  Okay to leave a detailed message?: Yes at Home number on file [phone] (home)  "

## 2025-05-09 ENCOUNTER — TELEPHONE (OUTPATIENT)
Dept: FAMILY MEDICINE | Facility: CLINIC | Age: 25
End: 2025-05-09
Payer: COMMERCIAL

## 2025-05-09 NOTE — TELEPHONE ENCOUNTER
Order/Referral Request    Who is requesting: Patient    Orders being requested: She needs a Titer, QuantiFERON TB Gold and labs for a typical AWV.    Reason service is needed/diagnosis: for school     When are orders needed by: asap    Has this been discussed with Provider: No    Does patient have a preference on a Group/Provider/Facility? [facility]    Does patient have an appointment scheduled?: No    Where to send orders: Place orders within Epic    Could we send this information to you in [employer] or would you prefer to receive a phone call?:   Patient would like to be contacted via [employer]

## 2025-05-12 NOTE — TELEPHONE ENCOUNTER
05/12/258  Relayed message to pt. She has already checked with insurance and does not think another preventative appt would be covered. She will see if previous provider can place these orders.  Nicol

## 2025-05-12 NOTE — TELEPHONE ENCOUNTER
05/12/25  Called pt and advised an annual preventative as her chart shows she is due. Pt states she had a well woman exam on January 2, 2025 at [facility], and her insurance will not pay for another with Dr. Porter. Writer advised lon, however, pt does not want to do this due to the potential cost associated with it. Pt is wondering if Dr. Porter can place the orders for the following for school:  QuantiFERON TB Gold   Routine lab work   MMR and Varicella titer    Please advise.  Nicol

## 2025-05-12 NOTE — TELEPHONE ENCOUNTER
If she is looking for labs she can ask her provider she just saw in Jan as they should be able to order routine labs/TB/MMR etc in the context of that physical that was done already. Otherwise needs evisit or a physical (usually I've seen insurances cover routine + ob/gyn physicals since they are done by different providers, she could call to check)

## 2025-05-28 NOTE — PROGRESS NOTES
COVID-19 PCR test completed. Patient handout For Patients Who Have Been Tested for Covid-19 (Coronavirus) was given to the patient, which includes test result notification process.     An angiography was performed of the LCFA.

## 2025-06-07 ENCOUNTER — HEALTH MAINTENANCE LETTER (OUTPATIENT)
Age: 25
End: 2025-06-07

## 2025-08-06 ENCOUNTER — APPOINTMENT (OUTPATIENT)
Dept: URBAN - METROPOLITAN AREA CLINIC 260 | Age: 25
Setting detail: DERMATOLOGY
End: 2025-08-06

## 2025-08-06 VITALS — HEIGHT: 63 IN | WEIGHT: 150 LBS

## 2025-08-06 DIAGNOSIS — Z71.89 OTHER SPECIFIED COUNSELING: ICD-10-CM

## 2025-08-06 DIAGNOSIS — L81.4 OTHER MELANIN HYPERPIGMENTATION: ICD-10-CM

## 2025-08-06 DIAGNOSIS — D22 MELANOCYTIC NEVI: ICD-10-CM

## 2025-08-06 PROBLEM — D22.5 MELANOCYTIC NEVI OF TRUNK: Status: ACTIVE | Noted: 2025-08-06

## 2025-08-06 PROCEDURE — OTHER MIPS QUALITY: OTHER

## 2025-08-06 PROCEDURE — OTHER COUNSELING: OTHER

## 2025-08-06 PROCEDURE — 99213 OFFICE O/P EST LOW 20 MIN: CPT

## 2025-08-06 ASSESSMENT — LOCATION DETAILED DESCRIPTION DERM
LOCATION DETAILED: INFERIOR THORACIC SPINE
LOCATION DETAILED: SUPERIOR LUMBAR SPINE

## 2025-08-06 ASSESSMENT — LOCATION SIMPLE DESCRIPTION DERM
LOCATION SIMPLE: UPPER BACK
LOCATION SIMPLE: LOWER BACK

## 2025-08-06 ASSESSMENT — LOCATION ZONE DERM: LOCATION ZONE: TRUNK

## (undated) DEVICE — BUR ARTHREX COOLCUT SABRE 4.0MMX13CM AR-8400SR

## (undated) DEVICE — DRAPE ARTHROSCOPY SHOULDER BEACHCHAIR 29369

## (undated) DEVICE — COVER CAMERA IN-LIGHT DISP LT-C02

## (undated) DEVICE — BRUSH SURGICAL SCRUB W/4% CHG SOL 25ML 371073

## (undated) DEVICE — TUBING SYSTEM ARTHREX PATIENT REDEUCE AR-6421

## (undated) DEVICE — GLOVE BIOGEL PI SZ 7.5 40875

## (undated) DEVICE — DRAPE STERI U 1015

## (undated) DEVICE — DEVICE PASSER LASSO 25DEG CVD LT AR-6068-25TL

## (undated) DEVICE — PREP DURAPREP REMOVER 4OZ 8611

## (undated) DEVICE — PREP DURAPREP 26ML APL 8630

## (undated) DEVICE — SUCTION MANIFOLD NEPTUNE 2 SYS 4 PORT 0702-020-000

## (undated) DEVICE — SOL NACL 0.9% IRRIG 3000ML BAG 2B7477

## (undated) DEVICE — ESU GROUND PAD ADULT W/CORD E7507

## (undated) DEVICE — TUBING SYSTEM ARTHREX PUMP REDEUCE AR-6411

## (undated) DEVICE — LINEN ORTHO PACK 5446

## (undated) DEVICE — SOL HYDROGEN PEROXIDE 3% 4OZ BOTTLE F0010

## (undated) DEVICE — KNOTLESS FIBERTAK DISPOSABLE KIT, STRAIGHT

## (undated) DEVICE — ESU PENCIL SMOKE EVAC W/ROCKER SWITCH 0703-047-000

## (undated) DEVICE — SOL WATER IRRIG 1000ML BOTTLE 2F7114

## (undated) DEVICE — ESU CLEANER TIP 31142717

## (undated) DEVICE — PACK SHOULDER CUSTOM ASC SOP15ASUMA

## (undated) DEVICE — DRAPE U-POUCH 34X29" 1067

## (undated) DEVICE — BLADE KNIFE SURG 10 371110

## (undated) DEVICE — SU ETHILON 3-0 PS-1 18" 1663H

## (undated) DEVICE — STRAP STIRRUP W/SLIP 30187-030

## (undated) DEVICE — SYR BULB IRRIG 50ML LATEX FREE 0035280

## (undated) DEVICE — SU FIBERWIRE 2 38"  AR-7200

## (undated) DEVICE — SU MONOCRYL 2-0 SH 27" UND Y417H

## (undated) DEVICE — DRSG STERI STRIP 1/2X4" R1547

## (undated) DEVICE — TUBING SUCTION MEDI-VAC 1/4"X20' N620A

## (undated) DEVICE — DRAPE MAYO STAND 23X54 8337

## (undated) DEVICE — SU MONOCRYL 3-0 PS-2 18" UND Y497G

## (undated) DEVICE — SOL WATER IRRIG 500ML BOTTLE 2F7113

## (undated) DEVICE — BLADE KNIFE SURG 15 371115

## (undated) DEVICE — DRAPE IOBAN INCISE 23X17" 6650EZ

## (undated) DEVICE — DEVICE PASSER LASSO 25DEG CVD RT AR-6068-25TR

## (undated) DEVICE — ARTHROSCOPIC CANNULA 5.5X70MM GRAY  9718

## (undated) RX ORDER — ACETAMINOPHEN 325 MG/1
TABLET ORAL
Status: DISPENSED
Start: 2020-09-09

## (undated) RX ORDER — CEFAZOLIN SODIUM 1 G/3ML
INJECTION, POWDER, FOR SOLUTION INTRAMUSCULAR; INTRAVENOUS
Status: DISPENSED
Start: 2023-01-04

## (undated) RX ORDER — PROPOFOL 10 MG/ML
INJECTION, EMULSION INTRAVENOUS
Status: DISPENSED
Start: 2020-09-09

## (undated) RX ORDER — OXYCODONE HYDROCHLORIDE 5 MG/1
TABLET ORAL
Status: DISPENSED
Start: 2020-09-09

## (undated) RX ORDER — HYDROMORPHONE HYDROCHLORIDE 1 MG/ML
INJECTION, SOLUTION INTRAMUSCULAR; INTRAVENOUS; SUBCUTANEOUS
Status: DISPENSED
Start: 2023-01-04

## (undated) RX ORDER — PROPOFOL 10 MG/ML
INJECTION, EMULSION INTRAVENOUS
Status: DISPENSED
Start: 2023-01-04

## (undated) RX ORDER — FENTANYL CITRATE 50 UG/ML
INJECTION, SOLUTION INTRAMUSCULAR; INTRAVENOUS
Status: DISPENSED
Start: 2020-09-09

## (undated) RX ORDER — BUPIVACAINE HYDROCHLORIDE 2.5 MG/ML
INJECTION, SOLUTION EPIDURAL; INFILTRATION; INTRACAUDAL
Status: DISPENSED
Start: 2023-01-04

## (undated) RX ORDER — ONDANSETRON 2 MG/ML
INJECTION INTRAMUSCULAR; INTRAVENOUS
Status: DISPENSED
Start: 2023-01-04

## (undated) RX ORDER — FENTANYL CITRATE 50 UG/ML
INJECTION, SOLUTION INTRAMUSCULAR; INTRAVENOUS
Status: DISPENSED
Start: 2023-01-04

## (undated) RX ORDER — LIDOCAINE HYDROCHLORIDE 10 MG/ML
INJECTION, SOLUTION EPIDURAL; INFILTRATION; INTRACAUDAL; PERINEURAL
Status: DISPENSED
Start: 2021-12-07

## (undated) RX ORDER — EPHEDRINE SULFATE 50 MG/ML
INJECTION, SOLUTION INTRAMUSCULAR; INTRAVENOUS; SUBCUTANEOUS
Status: DISPENSED
Start: 2020-09-09

## (undated) RX ORDER — KETOROLAC TROMETHAMINE 30 MG/ML
INJECTION, SOLUTION INTRAMUSCULAR; INTRAVENOUS
Status: DISPENSED
Start: 2020-09-09

## (undated) RX ORDER — DEXAMETHASONE SODIUM PHOSPHATE 4 MG/ML
INJECTION, SOLUTION INTRA-ARTICULAR; INTRALESIONAL; INTRAMUSCULAR; INTRAVENOUS; SOFT TISSUE
Status: DISPENSED
Start: 2023-01-04

## (undated) RX ORDER — DEXAMETHASONE SODIUM PHOSPHATE 4 MG/ML
INJECTION, SOLUTION INTRA-ARTICULAR; INTRALESIONAL; INTRAMUSCULAR; INTRAVENOUS; SOFT TISSUE
Status: DISPENSED
Start: 2020-09-09

## (undated) RX ORDER — ONDANSETRON 2 MG/ML
INJECTION INTRAMUSCULAR; INTRAVENOUS
Status: DISPENSED
Start: 2020-09-09

## (undated) RX ORDER — LIDOCAINE HYDROCHLORIDE 20 MG/ML
INJECTION, SOLUTION EPIDURAL; INFILTRATION; INTRACAUDAL; PERINEURAL
Status: DISPENSED
Start: 2020-09-09

## (undated) RX ORDER — FENTANYL CITRATE-0.9 % NACL/PF 10 MCG/ML
PLASTIC BAG, INJECTION (ML) INTRAVENOUS
Status: DISPENSED
Start: 2020-09-09

## (undated) RX ORDER — EPHEDRINE SULFATE 50 MG/ML
INJECTION, SOLUTION INTRAMUSCULAR; INTRAVENOUS; SUBCUTANEOUS
Status: DISPENSED
Start: 2023-01-04

## (undated) RX ORDER — ACETAMINOPHEN 325 MG/1
TABLET ORAL
Status: DISPENSED
Start: 2023-01-04

## (undated) RX ORDER — CEFAZOLIN SODIUM 2 G/50ML
SOLUTION INTRAVENOUS
Status: DISPENSED
Start: 2020-09-09

## (undated) RX ORDER — EPINEPHRINE NASAL SOLUTION 1 MG/ML
SOLUTION NASAL
Status: DISPENSED
Start: 2023-01-04

## (undated) RX ORDER — GABAPENTIN 300 MG/1
CAPSULE ORAL
Status: DISPENSED
Start: 2020-09-09